# Patient Record
Sex: MALE | Race: WHITE | NOT HISPANIC OR LATINO | Employment: FULL TIME | URBAN - METROPOLITAN AREA
[De-identification: names, ages, dates, MRNs, and addresses within clinical notes are randomized per-mention and may not be internally consistent; named-entity substitution may affect disease eponyms.]

---

## 2017-01-12 ENCOUNTER — THERAPY VISIT (OUTPATIENT)
Dept: PHYSICAL THERAPY | Facility: CLINIC | Age: 62
End: 2017-01-12
Payer: COMMERCIAL

## 2017-01-12 DIAGNOSIS — M25.551 BILATERAL HIP PAIN: ICD-10-CM

## 2017-01-12 DIAGNOSIS — M25.651 HIP JOINT STIFFNESS, BILATERAL: Primary | ICD-10-CM

## 2017-01-12 DIAGNOSIS — M25.652 HIP JOINT STIFFNESS, BILATERAL: Primary | ICD-10-CM

## 2017-01-12 DIAGNOSIS — M25.552 BILATERAL HIP PAIN: ICD-10-CM

## 2017-01-12 PROCEDURE — 97530 THERAPEUTIC ACTIVITIES: CPT | Mod: GP | Performed by: PHYSICAL THERAPIST

## 2017-01-12 PROCEDURE — 97112 NEUROMUSCULAR REEDUCATION: CPT | Mod: GP | Performed by: PHYSICAL THERAPIST

## 2017-01-12 PROCEDURE — 97110 THERAPEUTIC EXERCISES: CPT | Mod: GP | Performed by: PHYSICAL THERAPIST

## 2017-01-12 NOTE — PROGRESS NOTES
NEXT VISIT:  Re-measure ROM, isolated strength, functional strength, balance      Activity  Reps/Set  X/Week  Goal    Joint Motion/Muscle Flexibility [TherEx]    All 4 s drills:  -pelvic tilting  -cat/cow  -rocking  10 reps per movement  Daily à every other day      Knee to chest, bottom leg stretched out straight  30-60 sec hold x 2 per side  Daily à every other day      Achilles stretch with step  10 gentle pushes, then 30 sec hold x 2 cycles  Daily à every other day      Targeted Muscle Strength [TherEx]    Straight leg raising lying on back  Work up to 20 reps x 2-3 sets per leg  3x/wk  Maintain neutral pelvis & spine as you lift/lower the leg  When easy, eliminate    All 4 s: fire-hydrant (out to the side), straight leg lift to horizontal (to the back) 10-15 reps  x 2 each 3x/wk  Full reps and sets with little to no fatigue    Bridging (single leg, arms crossed)  10-15 reps x 3 sets  3-4x/wk     Functional/Resisted Strength [TherAct]    Squats with 20# weight 10 reps x 3 sets  3x/wk  Gradually deeper squat OR increase weight   6-8  step drill (off side of step) 8-10 reps x 3 sets  3x/wk  Gradually higher step    1 leg squat hold + 45 degree band-resisted push to the back 15 toe taps x 3 per side 3x/wk Sit deeper into the squat   Band-resisted sidestepping 10 steps down, 10 steps back x 3-4 passes 3x/wk Sit deeper into squat, wider steps   Balance/Proprioception [Neuro Re-ed]    Marching balance drill; 3 patterns (in place, fwd/back, side/side) 10 marches x 1 set per pattern  3x/wk  Tolerate quicker foot switches or direction changes without losing balance    Eyes closed x 30 seconds   30 seconds x 4 per side  3x/day  30 second hold with NO hand touches consistently    Dynamic warm-up drill 2 laps per pattern 3x/wk Moving more quickly through the patterns

## 2017-02-02 ENCOUNTER — THERAPY VISIT (OUTPATIENT)
Dept: PHYSICAL THERAPY | Facility: CLINIC | Age: 62
End: 2017-02-02
Payer: COMMERCIAL

## 2017-02-02 DIAGNOSIS — M25.651 HIP JOINT STIFFNESS, BILATERAL: Primary | ICD-10-CM

## 2017-02-02 DIAGNOSIS — M25.551 BILATERAL HIP PAIN: ICD-10-CM

## 2017-02-02 DIAGNOSIS — M25.652 HIP JOINT STIFFNESS, BILATERAL: Primary | ICD-10-CM

## 2017-02-02 DIAGNOSIS — M25.552 BILATERAL HIP PAIN: ICD-10-CM

## 2017-02-02 PROCEDURE — 97750 PHYSICAL PERFORMANCE TEST: CPT | Mod: GP | Performed by: PHYSICAL THERAPIST

## 2017-02-02 PROCEDURE — 97110 THERAPEUTIC EXERCISES: CPT | Mod: GP | Performed by: PHYSICAL THERAPIST
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
## 2017-03-15 ENCOUNTER — THERAPY VISIT (OUTPATIENT)
Dept: PHYSICAL THERAPY | Facility: CLINIC | Age: 62
End: 2017-03-15
Payer: COMMERCIAL

## 2017-03-15 DIAGNOSIS — M25.552 BILATERAL HIP PAIN: ICD-10-CM

## 2017-03-15 DIAGNOSIS — M25.652 HIP JOINT STIFFNESS, BILATERAL: ICD-10-CM

## 2017-03-15 DIAGNOSIS — M25.551 BILATERAL HIP PAIN: ICD-10-CM

## 2017-03-15 DIAGNOSIS — M25.651 HIP JOINT STIFFNESS, BILATERAL: ICD-10-CM

## 2017-03-15 PROCEDURE — 97530 THERAPEUTIC ACTIVITIES: CPT | Mod: GP | Performed by: PHYSICAL THERAPIST

## 2017-03-15 PROCEDURE — 97110 THERAPEUTIC EXERCISES: CPT | Mod: GP | Performed by: PHYSICAL THERAPIST

## 2017-03-15 PROCEDURE — 97112 NEUROMUSCULAR REEDUCATION: CPT | Mod: GP | Performed by: PHYSICAL THERAPIST

## 2017-03-15 NOTE — MR AVS SNAPSHOT
After Visit Summary   3/15/2017    Evan Gregg    MRN: [id number]           Patient Information     Date Of Birth          1955        Visit Information        Provider Department      3/15/2017 7:00 AM Leni [facility]        Today's Diagnoses     Hip joint stiffness, bilateral        Bilateral hip pain           Follow-ups after your visit        Your next 10 appointments already scheduled     May 11, 2017  9:40 AM CDT   [facility] Extremity with [facility] ([facility] [place])    [address]  [address] [facility] [address] [phone]              Who to contact     If you have questions or need follow up information about today's clinic visit or your schedule please contact [facility] directly at [phone].  Normal or non-critical lab and imaging results will be communicated to you by MyChart, letter or phone within 4 business days after the clinic has received the results. If you do not hear from us within 7 days, please contact the clinic through Sunglasshart or phone. If you have a critical or abnormal lab result, we will notify you by phone as soon as possible.  Submit refill requests through [employer] or call your pharmacy and they will forward the refill request to us. Please allow 3 business days for your refill to be completed.          Additional Information About Your Visit        MyChart Information     [employer] gives you secure access to your electronic health record. If you see a primary care provider, you can also send messages to your care team and make appointments. If you have questions, please call your primary care clinic.  If you do not have a primary care provider, please call [phone] and they will assist you.        Care EveryWhere ID     This is your Care EveryWhere ID. This could be used by other organizations to access your [facility]  records  [id number]         Blood Pressure from Last 3 Encounters:   08/24/16 125/80   06/09/16 112/67   11/25/15 124/70    Weight from Last 3 Encounters:   08/24/16 91.4 kg (201 lb 6.4 oz)   06/09/16 89.8 kg (198 lb)   11/25/15 89.3 kg (196 lb 12.8 oz)              We Performed the Following     NEUROMUSCULAR RE-EDUCATION     THERAPEUTIC ACTIVITIES     THERAPEUTIC EXERCISES        Primary Care Provider Office Phone # Fax #    Brennan Pineda [phone]       [facility] [address] [facility] [address]        Thank you!     Thank you for choosing [facility]  for your care. Our goal is always to provide you with excellent care. Hearing back from our patients is one way we can continue to improve our services. Please take a few minutes to complete the written survey that you may receive in the mail after your visit with us. Thank you!             Your Updated Medication List - Protect others around you: Learn how to safely use, store and throw away your medicines at www.disposemymeds.org.          This list is accurate as of: 3/15/17 11:59 PM.  Always use your most recent med list.                   Brand Name Dispense Instructions for use    metroNIDAZOLE 0.75 % topical gel    METROGEL    45 g    Apply pea-sized amount or less to cheeks and nose as needed.       * triamcinolone 0.025 % cream    KENALOG    240 g    mix 50/50 with Eucerin for eczema.       * triamcinolone 0.1 % cream    KENALOG    480 g    Apply 1-2 times daily as needed for rash on hands and trunk       * Notice:  This list has 2 medication(s) that are the same as other medications prescribed for you. Read the directions carefully, and ask your doctor or other care provider to review them with you.

## 2017-03-15 NOTE — PROGRESS NOTES
DISCHARGE SUMMARY        SUBJECTIVE  Started doing yoga.  Right hip is less flexible than left.  Sitting for his commute has been no problem.  He walked a 5K in under 14 min/mi with no episodes of pain at the hip.  He notes that about 75% of the time he does NOT have any hip pain with walking.  Back to doing some of our pre-operative stretches - observes improvement (butterfly groin stretch in sitting; hurdler stretch; seated straddle sit; standing forward bend; standing quad stretch).    Initial Pain level: 4/10  Current Pain level: 0/10    Changes in function:  Yes (See Goal flowsheet attached for changes in current functional level)    Adverse reaction to treatment or activity: None    OBJECTIVE  Right Hip Flexion (SLR position)  Right 5/5, Left 4/5      ASSESSMENT/PLAN  Goals met.  Patient is competent with a progressive HEP.  He will continue with independent management and only f/u on an as needed basis with any new concerns or set-backs.    Discharge from formal PT.      Activity  Reps/Set  X/Week  Goal    Joint Motion/Muscle Flexibility [TherEx]   Basic LE stretching (HS, adductors, hip, quad, calf)  30 sec hold x 1-2 sets per exercise Daily à every other day  Progress further into the motion, respecting the hip joint   Basic hip mobility warm-up (all 4 s rocking, cat/cow)  Rocking x 10 reps each Prior to other exercises     Trunk/spine mobility (trunk rotation, yoga activities)       Targeted Muscle Strength [TherEx]   Straight leg raise lying on back  20 reps x 2-3 sets per leg  3x/wk  Maintain neutral pelvis & spine as you lift/lower the leg  When easy, eliminate    Gluteal Strength:   (pick 1 per workout)  -All 4 s fire-hydrant (out to -All 4 s straight leg lift to back  -Single leg bridging 10-15 reps x 3 sets 3x/wk  Full reps and sets with little to no fatigue    Sidelying hip abduction lift 30 reps x 1-2 sets 3x/wk Add ankle weight for resistance   Functional/Resisted Strength (pick 2 per workout)  [TherAct]   Squats with 20# weight 10 reps x 3 sets  3x/wk  Gradually deeper squat OR increase weight   6-8  step drill (off the side or stepping up from behind - lunge style) 6-10 reps x 3-4 sets  3x/wk  Gradually higher step    Lunges:  -straight forward; 45 degree diagonal 8-10 reps per leg; 1 set per direction 3x/wk    Balance/Proprioception (pick 1 per workout) [Neuro Re-ed]   Marching balance drill; 3 patterns (in place, fwd/back, side/side) 10 marches x 1 set per pattern  3x/wk  Tolerate quicker foot switches or direction changes without losing balance    Eyes closed x 30 seconds   30 seconds x 4 per side  3x/day  30 second hold with NO hand touches consistently    Dynamic warm-up drill 2 laps per pattern 3x/wk Moving more quickly through the patterns

## 2017-10-25 ENCOUNTER — OFFICE VISIT (OUTPATIENT)
Dept: FAMILY MEDICINE | Facility: CLINIC | Age: 62
End: 2017-10-25
Payer: COMMERCIAL

## 2017-10-25 VITALS
RESPIRATION RATE: 18 BRPM | SYSTOLIC BLOOD PRESSURE: 110 MMHG | DIASTOLIC BLOOD PRESSURE: 60 MMHG | BODY MASS INDEX: 28.98 KG/M2 | HEIGHT: 70 IN | TEMPERATURE: 98.7 F | WEIGHT: 202.4 LBS | HEART RATE: 68 BPM
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
## 2017-10-25 DIAGNOSIS — L30.9 DERMATITIS: ICD-10-CM

## 2017-10-25 PROCEDURE — 99213 OFFICE O/P EST LOW 20 MIN: CPT | Performed by: FAMILY MEDICINE

## 2017-10-25 RX ORDER — DOXYCYCLINE 100 MG/1
200 CAPSULE ORAL ONCE
Qty: 2 CAPSULE | Refills: 0 | Status: SHIPPED | OUTPATIENT
Start: 2017-10-25 | End: 2017-10-25

## 2017-10-25 RX ORDER — TRIAMCINOLONE ACETONIDE 0.25 MG/G
CREAM TOPICAL
Qty: 240 G | Refills: 5 | Status: SHIPPED | OUTPATIENT
Start: 2017-10-25 | End: 2023-05-15

## 2017-10-25 ASSESSMENT — PAIN SCALES - GENERAL: PAINLEVEL: MILD PAIN (2)

## 2017-10-25 NOTE — PATIENT INSTRUCTIONS
W57.XXXA) Tick bite, initial encounter  (primary encounter diagnosis)  Comment: deer tick  Plan: doxycycline (VIBRAMYCIN) 100 MG capsule        Since this is an endemic area for lymes I did discuss the possibility of using antibiotic prophyllaxis to prevent Lyme's.  Risk would be highest with immature tick attached for over 36 hours and treated within 72 hours.    Reviewed potential risks of antibiotic use which could be unnecessary vs. potentially missing Lyme's.   An alternative woud be to monitor for Lyme's symptoms or ECM rash and treat if symptoms appear.   Approach to prophylaxis -- recommended only in patients who meet all of the following criteria   Attached tick identified as an adult or nymphal Ixodes scapularis tick (deer tick).   Tick is estimated to have been attached for ?36 hours (by degree of engorgement or time of exposure).   Prophylaxis is begun within 72 hours of tick removal.   Local rate of infection of ticks with B. burgdorferi is ?20 percent (these rates of infection have been shown to occur in parts of [place], parts of the [place], and parts of [place] and [place]).   Doxycycline is not contraindicated (ie, the patient is not <8 years of age, pregnant, or lactating).  Prophylactic (preventative) antibiotic lyme treatment is not currently recommended in children under age 8 as the main medication used for this can cause problems with the teeth.  There are no other medications approved or tested for this use.     He elected to take antibiotics.   Will treat with doxycycline 100 mg 2 pills at one time.  Recheck with further problems.    For further information check out the handout on lyme disease from [facility], the [facility] website ([email or web address]/) or [employer] patient education information.       (L30.9) Dermatitis  Comment: needs refill  Plan: triamcinolone (KENALOG) 0.025 % cream

## 2017-10-25 NOTE — MR AVS SNAPSHOT
After Visit Summary   10/25/2017    Evan Gregg    MRN: [id number]           Patient Information     Date Of Birth          1955        Visit Information        Provider Department      10/25/2017 10:00 AM Brennan Pineda MD [facility]        Today's Diagnoses     Tick bite, initial encounter    -  1    Dermatitis          Care Instructions    W57.XXXA) Tick bite, initial encounter  (primary encounter diagnosis)  Comment: deer tick  Plan: doxycycline (VIBRAMYCIN) 100 MG capsule        Since this is an endemic area for lymes I did discuss the possibility of using antibiotic prophyllaxis to prevent Lyme's.  Risk would be highest with immature tick attached for over 36 hours and treated within 72 hours.    Reviewed potential risks of antibiotic use which could be unnecessary vs. potentially missing Lyme's.   An alternative woud be to monitor for Lyme's symptoms or ECM rash and treat if symptoms appear.   Approach to prophylaxis -- recommended only in patients who meet all of the following criteria   Attached tick identified as an adult or nymphal Ixodes scapularis tick (deer tick).   Tick is estimated to have been attached for ?36 hours (by degree of engorgement or time of exposure).   Prophylaxis is begun within 72 hours of tick removal.   Local rate of infection of ticks with B. burgdorferi is ?20 percent (these rates of infection have been shown to occur in parts of [place], parts of the [place], and parts of [place] and [place]).   Doxycycline is not contraindicated (ie, the patient is not <8 years of age, pregnant, or lactating).  Prophylactic (preventative) antibiotic lyme treatment is not currently recommended in children under age 8 as the main medication used for this can cause problems with the teeth.  There are no other medications approved or tested for this use.     He elected to take antibiotics.   Will treat with doxycycline 100 mg 2  "pills at one time.  Recheck with further problems.    For further information check out the handout on lyme disease from [facility], the [facility] website ([email or web address]/) or [employer] patient education information.       (L30.9) Dermatitis  Comment: needs refill  Plan: triamcinolone (KENALOG) 0.025 % cream          Follow-ups after your visit        Who to contact     If you have questions or need follow up information about today's clinic visit or your schedule please contact [facility] directly at [phone].  Normal or non-critical lab and imaging results will be communicated to you by enrich-inhart, letter or phone within 4 business days after the clinic has received the results. If you do not hear from us within 7 days, please contact the clinic through enrich-inhart or phone. If you have a critical or abnormal lab result, we will notify you by phone as soon as possible.  Submit refill requests through [employer] or call your pharmacy and they will forward the refill request to us. Please allow 3 business days for your refill to be completed.          Additional Information About Your Visit        MyChart Information     [employer] gives you secure access to your electronic health record. If you see a primary care provider, you can also send messages to your care team and make appointments. If you have questions, please call your primary care clinic.  If you do not have a primary care provider, please call [phone] and they will assist you.        Care EveryWhere ID     This is your Care EveryWhere ID. This could be used by other organizations to access your [place] medical records  [id number]        Your Vitals Were     Pulse Temperature Respirations Height BMI (Body Mass Index)       68 98.7  F (37.1  C) (Tympanic) 18 5' 10\" (1.778 m) 29.04 kg/m2        Blood Pressure from Last 3 Encounters:   10/25/17 110/60   08/24/16 125/80   06/09/16 112/67    Weight from Last 3 " Encounters:   10/25/17 202 lb 6.4 oz (91.8 kg)   08/24/16 201 lb 6.4 oz (91.4 kg)   06/09/16 198 lb (89.8 kg)              Today, you had the following     No orders found for display         Today's Medication Changes          These changes are accurate as of: 10/25/17 11:27 AM.  If you have any questions, ask your nurse or doctor.               Start taking these medicines.        Dose/Directions    doxycycline 100 MG capsule   Commonly known as:  VIBRAMYCIN   Used for:  Tick bite, initial encounter   Started by:  Brennan Pineda MD        Dose:  200 mg   Take 2 capsules (200 mg) by mouth once for 1 dose   Quantity:  2 capsule   Refills:  0         These medicines have changed or have updated prescriptions.        Dose/Directions    * triamcinolone 0.1 % cream   Commonly known as:  KENALOG   This may have changed:  Another medication with the same name was changed. Make sure you understand how and when to take each.   Used for:  Dermatitis   Changed by:  Brennan Pineda MD        Apply 1-2 times daily as needed for rash on hands and trunk   Quantity:  480 g   Refills:  3       * triamcinolone 0.025 % cream   Commonly known as:  KENALOG   This may have changed:  additional instructions   Used for:  Dermatitis   Changed by:  Brennan Pineda MD        mix 50/50 with Eucerin for eczema. (Patient will mix himself)   Quantity:  240 g   Refills:  5       * Notice:  This list has 2 medication(s) that are the same as other medications prescribed for you. Read the directions carefully, and ask your doctor or other care provider to review them with you.         Where to get your medicines      These medications were sent to [id number] - [facility] [address]  [address] [address]    Hours:  Closed 10-16-08 business to [facility] Phone:  [phone]     doxycycline 100 MG capsule    triamcinolone 0.025 % cream                Primary Care Provider Office Phone # Fax #     Brennan Pineda [phone]       [address] [place] [address]        Equal Access to Services     MACK MCKOY : Hadii aad ku hadroddy Chance, suzanne elliezacharyha, carlos shabubba bhatia, jin figueroayohan mahesh. So [facility] [phone].    ATENCIÓN: Si habla español, tiene a guevara disposición servicios gratuitos de asistencia lingüística. Llame al [phone].    We comply with applicable federal civil rights laws and [place] laws. We do not discriminate on the basis of race, color, national origin, age, disability, sex, sexual orientation, or gender identity.            Thank you!     Thank you for choosing [facility]  for your care. Our goal is always to provide you with excellent care. Hearing back from our patients is one way we can continue to improve our services. Please take a few minutes to complete the written survey that you may receive in the mail after your visit with us. Thank you!             Your Updated Medication List - Protect others around you: Learn how to safely use, store and throw away your medicines at www.disposemymeds.org.          This list is accurate as of: 10/25/17 11:27 AM.  Always use your most recent med list.                   Brand Name Dispense Instructions for use Diagnosis    doxycycline 100 MG capsule    VIBRAMYCIN    2 capsule    Take 2 capsules (200 mg) by mouth once for 1 dose    Tick bite, initial encounter       metroNIDAZOLE 0.75 % topical gel    METROGEL    45 g    Apply pea-sized amount or less to cheeks and nose as needed.    Acne rosacea       * triamcinolone 0.1 % cream    KENALOG    480 g    Apply 1-2 times daily as needed for rash on hands and trunk    Dermatitis       * triamcinolone 0.025 % cream    KENALOG    240 g    mix 50/50 with Eucerin for eczema. (Patient will mix himself)    Dermatitis       * Notice:  This list has 2 medication(s) that are the same as other medications prescribed for you.  Read the directions carefully, and ask your doctor or other care provider to review them with you.

## 2017-10-25 NOTE — PROGRESS NOTES
"  SUBJECTIVE:   Evan Gregg is a 62 year old male who presents to clinic today for the following health issues:  Tick bite - right hip    Chief Complaint   Patient presents with     Tick Bite     R hip- states not sure if he was able to remove it all.       Duration: Found deer tick embedded on right hip 10/22/2017, had no signs of tick on 10/20/17    Description (location/character/radiation): R hip    Intensity:  mild    Accompanying signs and symptoms: some increase redness and wondering if tick was all removed.    History (similar episodes/previous evaluation): PAST HX OF BILATERAL HIP REPLACEMENT    Precipitating or alleviating factors: None    Therapies tried and outcome: None   Found tick embedded on 10/22, removed tick with alcohol and gently pulled tick off. Is not sure entire tick was removed. Was walking through woods and fields with dogs on weekend on 10/20-10/22. Found tick when taking a shower. He is sure it was a deer tick as he has seen them on his dogs in the past. Is concerned of Lyme disease affecting his bilateral hip replacements. No fever, weakness, fatigue, nausea, vomiting, new joint pain, or \"bullseye rash\".    Problem list and histories reviewed & adjusted, as indicated.  Additional history: as documented    Labs reviewed in EPIC    Reviewed and updated as needed this visit by clinical staffTobacco  Allergies  Med Hx  Surg Hx  Fam Hx  Soc Hx      Reviewed and updated as needed this visit by Provider       ROS:  Constitutional: No fever, chills, malaise, weakness  ENT: Feels congested. No sore throat, drainage  Respiratory: No shortness of breath, wheezing, cough  Cardiovascular: No chest pain or palpitations  Gastrointestinal: No nausea, vomiting  Skin: Small red rash on right hip at site of tick bite.   Musculoskeletal: No arthralgias, myalgias, muscle weakness  Allergic: No known allergies  Neurologic: Headache. No focal weakness, sensory loss, paresthesias  Psychiatric: " "Increased stress due to wife's recent hospitalization. No changes in sleep, mood, interest.    OBJECTIVE:     /60 (BP Location: Right arm, Patient Position: Chair, Cuff Size: Adult Large)  Pulse 68  Temp 98.7  F (37.1  C) (Tympanic)  Resp 18  Ht 5' 10\" (1.778 m)  Wt 202 lb 6.4 oz (91.8 kg)  BMI 29.04 kg/m2  Body mass index is 29.04 kg/(m^2).  Constitutional: well appearing, in no acute distress  Head: normocephalic  Eyes: PERRLA bilaterally, EOMs intact bilaterally, anicteric sclera  ENT: moist mucous membranes, non-erythematous oropharynx, no lymphadenopathy  Respiratory: lung sounds clear to auscultation and equal bilaterally, no wheezes, crackles, or rhonchi  Cardiovascular: regular rate and rhythm, normal S1 and S2, no rubs, murmurs or gallops, peripheral pulses strong and equal bilaterally, no peripheral edema  Skin: pinpoint scabbing at site of tick bite with 2x2 cm erythematous base on right lateral hip, no visualized tick remnants   Neuro: alert and oriented to person, time, and place  Psych: appropriate mood and affect, cooperative with exam    Diagnostic Test Results:  none     ASSESSMENT/PLAN:   1. Tick bite, initial encounter: Deer tick bite per patient report. No signs or symptoms of fatigue, fever, joint pains, or characteristic bullseye rash indicative of Lyme disease. No remnants of tick to remove. Will prophylactically treat with doxycycline as he meets criteria of known deer tick bite within 72 hours, in region of Lyme disease, and period of tick attachment. Patient instructed to monitor self for signs and symptoms of Lyme disease, informational handouts provided.  - doxycycline (VIBRAMYCIN) 100 MG capsule; Take 2 capsules (200 mg) by mouth once for 1 dose  Dispense: 2 capsule; Refill: 0    2. Dermatitis: History of eczema, is well controlled with current therapy. Refill needed.  - triamcinolone (KENALOG) 0.025 % cream; mix 50/50 with Eucerin for eczema. (Patient will mix himself)  " Dispense: 240 g; Refill: 5    Esteban James, MS3    I did see and examine patient with Esteban James today.   MARLON BOWER MD     ASSESSMENT:   (W57.XXXA) Tick bite, initial encounter  (primary encounter diagnosis)  Comment: deer tick  Plan: doxycycline (VIBRAMYCIN) 100 MG capsule        Since this is an endemic area for lymes I did discuss the possibility of using antibiotic prophyllaxis to prevent Lyme's.  Risk would be highest with immature tick attached for over 36 hours and treated within 72 hours.    Reviewed potential risks of antibiotic use which could be unnecessary vs. potentially missing Lyme's.   An alternative woud be to monitor for Lyme's symptoms or ECM rash and treat if symptoms appear.   Approach to prophylaxis -- recommended only in patients who meet all of the following criteria   Attached tick identified as an adult or nymphal Ixodes scapularis tick (deer tick).   Tick is estimated to have been attached for ?36 hours (by degree of engorgement or time of exposure).   Prophylaxis is begun within 72 hours of tick removal.   Local rate of infection of ticks with B. burgdorferi is ?20 percent (these rates of infection have been shown to occur in parts of [place], parts of the [place], and parts of [place] and [place]).   Doxycycline is not contraindicated (ie, the patient is not <8 years of age, pregnant, or lactating).  Prophylactic (preventative) antibiotic lyme treatment is not currently recommended in children under age 8 as the main medication used for this can cause problems with the teeth.  There are no other medications approved or tested for this use.     He elected to take antibiotics.   Will treat with doxycycline 100 mg 2 pills at one time.  Recheck with further problems.    For further information check out the handout on lyme disease from CDC, the [facility] website (http://[email or web address].[email or web address]/) or [facility] patient education information.        (L30.9) Dermatitis  Comment: needs refill  Plan: triamcinolone (KENALOG) 0.025 % cream

## 2017-10-25 NOTE — NURSING NOTE
"Chief Complaint   Patient presents with     Tick Bite     R hip- states not sure if he was able to remove it all.       Initial /60 (BP Location: Right arm, Patient Position: Chair, Cuff Size: Adult Large)  Pulse 68  Temp 98.7  F (37.1  C) (Tympanic)  Resp 18  Ht 5' 10\" (1.778 m)  Wt 202 lb 6.4 oz (91.8 kg)  BMI 29.04 kg/m2 Estimated body mass index is 29.04 kg/(m^2) as calculated from the following:    Height as of this encounter: 5' 10\" (1.778 m).    Weight as of this encounter: 202 lb 6.4 oz (91.8 kg).  Medication Reconciliation: complete    Health Maintenance that is potentially due pending provider review:          Is there anyone who you would like to be able to receive your results? No  If yes have patient fill out FADUMO  Lucy Segundo CMA    "

## 2018-01-31 ENCOUNTER — RADIANT APPOINTMENT (OUTPATIENT)
Dept: GENERAL RADIOLOGY | Facility: CLINIC | Age: 63
End: 2018-01-31
Attending: FAMILY MEDICINE
Payer: COMMERCIAL

## 2018-01-31 ENCOUNTER — OFFICE VISIT (OUTPATIENT)
Dept: ORTHOPEDICS | Facility: CLINIC | Age: 63
End: 2018-01-31
Payer: COMMERCIAL

## 2018-01-31 VITALS
WEIGHT: 210 LBS | DIASTOLIC BLOOD PRESSURE: 85 MMHG | HEART RATE: 58 BPM | BODY MASS INDEX: 30.06 KG/M2 | HEIGHT: 70 IN | SYSTOLIC BLOOD PRESSURE: 138 MMHG

## 2018-01-31 DIAGNOSIS — M24.021 LOOSE BODY IN ELBOW JOINT, RIGHT: ICD-10-CM

## 2018-01-31 DIAGNOSIS — M25.511 ACUTE PAIN OF RIGHT SHOULDER: Primary | ICD-10-CM

## 2018-01-31 DIAGNOSIS — M25.521 CHRONIC PAIN OF RIGHT ELBOW: ICD-10-CM

## 2018-01-31 DIAGNOSIS — G89.29 CHRONIC PAIN OF RIGHT ELBOW: ICD-10-CM

## 2018-01-31 DIAGNOSIS — M25.511 ACUTE PAIN OF RIGHT SHOULDER: ICD-10-CM

## 2018-01-31 DIAGNOSIS — M19.011 OSTEOARTHRITIS OF RIGHT AC (ACROMIOCLAVICULAR) JOINT: ICD-10-CM

## 2018-01-31 DIAGNOSIS — M19.021 PRIMARY OSTEOARTHRITIS OF RIGHT ELBOW: ICD-10-CM

## 2018-01-31 RX ORDER — MELOXICAM 15 MG/1
15 TABLET ORAL DAILY
Qty: 15 TABLET | Refills: 0 | Status: SHIPPED | OUTPATIENT
Start: 2018-01-31 | End: 2019-07-25

## 2018-01-31 NOTE — LETTER
1/31/2018       RE: Evan Gregg  [address] [place] [facility] [address]     Dear Colleague,    Thank you for referring your patient, Evan Gregg, to the [facility] SPORTS AND ORTHOPAEDIC WALK IN CLINIC at [facility]. Please see a copy of my visit note below.          SPORTS & ORTHOPEDIC WALK-IN VISIT 1/31/2018    Primary Care Physician: Dr. Pineda    Reason for visit:     What part of your body is injured / painful?  right arm pain, anterior shoulder pain    What caused the injury /pain? Unsure, gradual onset    How long ago did your injury occur or pain begin? several months ago, 4 months    What are your most bothersome symptoms? Pain and Weakness    How would you characterize your symptom?  aching and sharp    What makes your symptoms better? Ibuprofen, Rest    What makes your symptoms worse? Movement (overhead lifting, adduction), push ups    Have you been previously seen for this problem? No    Medical History:    Any recent changes to your medical history? No    Any new medication prescribed since last visit? No    Have you had surgery on this body part before? No    Social History:    Occupation: [employer]    Handedness: Right    Exercise: Strength training, push ups    Review of Systems:    Do you have fever, chills, weight loss? No    Do you have any vision problems? No    Do you have any chest pain or edema? No    Do you have any shortness of breath or wheezing?  No    Do you have stomach problems? No    Do you have any numbness or focal weakness? No, occasionally down right arm    Do you have diabetes? No    Do you have problems with bleeding or clotting? No    Do you have an rashes or other skin lesions? No           CHIEF COMPLAINT:  Consult (Right arm )       HISTORY OF PRESENT ILLNESS  Mr. Gregg is a pleasant 62 year old year old male who presents to clinic today with right anterior shoulder and elbow pain.  Evan explains that his primary concern  today is right anterior shoulder.  He states that he has been experiencing pain with overhead motion as well as push-up and cross body motions.  This began several months ago at top of his shoulder as well as anteriorly at humerus.  Denies lateral shoulder pain about deltoid.  He also feels like he has been becoming weaker during his upper body workouts.   No radicular pain.     Treatments to date have included ibuprofen, relative rest which has been unsuccessful.  He has not had any surgeries to his shoulder or arm.    He does note clicking of his right elbow and stiffness.  No overt pain of the elbow however but he believes stiffness has made it more difficult for him to complete upper body workouts.    Additional history: History of arthritis of his hips, status post hip resurfacing.    MEDICAL HISTORY  Patient Active Problem List   Diagnosis     Rosacea     CARDIOVASCULAR SCREENING; LDL GOAL LESS THAN 160     Plantar wart of left foot     BCC (basal cell carcinoma), arm     Osteoarthritis of hip     Bilateral hip pain     Abnormal gait     Primary osteoarthritis of both hips     Hip joint stiffness, bilateral       Current Outpatient Prescriptions   Medication Sig Dispense Refill     meloxicam (MOBIC) 15 MG tablet Take 1 tablet (15 mg) by mouth daily 15 tablet 0     triamcinolone (KENALOG) 0.025 % cream mix 50/50 with Eucerin for eczema. (Patient will mix himself) 240 g 5     triamcinolone (KENALOG) 0.1 % cream Apply 1-2 times daily as needed for rash on hands and trunk 480 g 3     metroNIDAZOLE (METROGEL) 0.75 % gel Apply pea-sized amount or less to cheeks and nose as needed. 45 g 1       Allergies   Allergen Reactions     No Known Drug Allergies        Family History   Problem Relation Age of Onset     Thyroid Disease Mother      HEART DISEASE Father      heart attack     CANCER Father      skin cancer     Lipids Father      Hypertension Father      Cancer - colorectal Maternal Grandfather      Thyroid Disease  "Brother      CANCER Brother      skin ca     Social history: Patient is right-handed, works at the [employer], exercises regularly    Additional medical/Social/Surgical histories reviewed in [facility] and updated as appropriate.     REVIEW OF SYSTEMS (2/1/2018)  CONSTITUTIONAL: Denies fever and weight loss  EYES: Denies acute vision changes  ENT: Denies hearing changes or difficulty swallowing  CARDIAC: Denies chest pain or edema  RESPIRATORY: Denies dyspnea, cough or wheeze  GASTROINTESTINAL: Denies abdominal pain, nausea, vomiting  MUSCULOSKELETAL: See HPI  SKIN: Denies any recent rash or lesion  NEUROLOGICAL: Denies numbness or focal weakness  PSYCHIATRIC: No history of psychiatric symptoms or problems  ENDOCRINE: Denies current diagnosis of diabetes  HEMATOLOGY: Denies episodes of easy bleeding      PHYSICAL EXAM  /85  Pulse 58  Ht 1.778 m (5' 10\")  Wt 95.3 kg (210 lb)  BMI 30.13 kg/m2    General  - normal appearance, in no obvious distress  CV  - normal radial pulse  Pulm  - normal respiratory pattern, non-labored  Musculoskeletal - Right shoulder  - inspection: normal bone and joint alignment, no obvious deformity, no scapular winging, no AC step-off, mild swelling over AC joint, normal clavicle  - palpation: tender over AC joint, clavicle does not displace when pressed.  Additional tenderness to palpation at bicipital groove.    - ROM: Discomfort with passive flexion past 90 deg, painful cross body adduction  - strength: 5/5  strength, 5/5 in all shoulder planes  - special tests:  (+) crossarm  (-) Speed's  (-) Yergason's  (-) Neer  (-) Hawkin's  (-) Isidro  (-) Glacier's    Right elbow with full flexion, limited extension to about 20 degrees. Decreased pronation and supination.  Mild tenderness at common extensor tendon.  Full painless elb isow flexion, extension 5/5, wrist extension 5/5, and long finger extension 5/5.      Neuro  - no sensory or motor deficit, grossly normal " coordination, normal muscle tone  Skin  - no ecchymosis, erythema, warmth, or induration, no obvious rash  Psych  - interactive, appropriate, normal mood and affect    IMAGING : XR right shoulder, elbow. Final results and radiologist's interpretation, available in the [facility] health record. Images were reviewed with the patient/family members in the office today. My personal interpretation of the performed imaging is shoulder without glenohumeral osteoarthritis.  Moderate AC joint narrowing with osteophyte superiorly.       Right elbow with enthesopathic changes at olecranon, medial epicondyle.  Degenerative changes of elbow present.       ASSESSMENT & PLAN  Mr. Gregg is a 62 year old year old male who presents to clinic today with pain of his anterior  Shoulder and stiffness of right elbow.  Shoulder pain likely secondary to #1 AC joint osteoarthritis.  #2 mild biceps tendonitis.  We discussed initiating PT for shoulder, discussing resistance exercise limitations with therapist, and returning in 6 weeks to consider corticosteroid injection.    In regard to right elbow, degenerative changes are seen.  Initiate elbow physical therapy and avoid exacerbating motions.  Start voltaren x 1 week.  Consider MRI for evaluation of loose bodies /degenerative changes if persisting.    Diagnosis:   Acromioclavicular joint osteoarthritis of right shoulder  Mild biceps tendinitis of right shoulder  Right elbow osteoarthritis    F/u after physical therapy in 6 week    It was a pleasure seeing Evan today.      Again, thank you for allowing me to participate in the care of your patient.      Sincerely,    Levar Tracy, DO

## 2018-01-31 NOTE — PROGRESS NOTES
SPORTS & ORTHOPEDIC WALK-IN VISIT 1/31/2018    Primary Care Physician: Dr. Pineda    Reason for visit:     What part of your body is injured / painful?  right arm pain, anterior shoulder pain    What caused the injury /pain? Unsure, gradual onset    How long ago did your injury occur or pain begin? several months ago, 4 months    What are your most bothersome symptoms? Pain and Weakness    How would you characterize your symptom?  aching and sharp    What makes your symptoms better? Ibuprofen, Rest    What makes your symptoms worse? Movement (overhead lifting, adduction), push ups    Have you been previously seen for this problem? No    Medical History:    Any recent changes to your medical history? No    Any new medication prescribed since last visit? No    Have you had surgery on this body part before? No    Social History:    Occupation: [employer]    Handedness: Right    Exercise: Strength training, push ups    Review of Systems:    Do you have fever, chills, weight loss? No    Do you have any vision problems? No    Do you have any chest pain or edema? No    Do you have any shortness of breath or wheezing?  No    Do you have stomach problems? No    Do you have any numbness or focal weakness? No, occasionally down right arm    Do you have diabetes? No    Do you have problems with bleeding or clotting? No    Do you have an rashes or other skin lesions? No

## 2018-01-31 NOTE — MR AVS SNAPSHOT
After Visit Summary   1/31/2018    Evan Gregg    MRN: [id number]           Patient Information     Date Of Birth          1955        Visit Information        Provider Department      1/31/2018 8:10 AM Levar Tracy DO M [facility] and Orthopaedic Walk In [facility]        Today's Diagnoses     Acute pain of right shoulder    -  1    Chronic pain of right elbow        Primary osteoarthritis of right elbow        Osteoarthritis of right AC (acromioclavicular) joint        Loose body in elbow joint, right           Follow-ups after your visit        Additional Services     PHYSICAL THERAPY REFERRAL (Internal)       Physical Therapy Referral                  Follow-up notes from your care team     Return in about 6 weeks (around 3/14/2018).      Your next 10 appointments already scheduled     Feb 07, 2018  8:20 AM CST   VIKASH Extremity with DONAVAN Del Angel [facility] Physical Therapy VIKASH ([facility])    [address] [place] [id number] [phone]            Feb 15, 2018  8:40 AM CST   VIKASH Extremity with WILBUR Sanders [facility] Physical Therapy VIKASH ([facility])    [address] [place] [id number] [phone]            Mar 01, 2018  8:40 AM CST   VIKASH Extremity with WILBUR Sanders [facility] Physical Therapy VIKASH ([facility])    [address] [place] [address] [phone]            Mar 08, 2018  8:40 AM CST   VIKASH Extremity with WILBUR Sanders [facility] Physical Therapy VIKASH ([facility])    [address] [place] [address] [phone]              Who to contact     Please call your clinic at [phone] to:    Ask questions about your health    Make or cancel appointments    Discuss your medicines    Learn about your test  "results    Speak to your doctor   If you have compliments or concerns about an experience at your clinic, or if you wish to file a complaint, please contact [facility] Patient Relations at [phone] or email us at [email or web address]         Additional Information About Your Visit        [employer] Information     [employer] gives you secure access to your electronic health record. If you see a primary care provider, you can also send messages to your care team and make appointments. If you have questions, please call your primary care clinic.  If you do not have a primary care provider, please call [phone] and they will assist you.      [employer] is an electronic gateway that provides easy, online access to your medical records. With [employer], you can request a clinic appointment, read your test results, renew a prescription or communicate with your care team.     To access your existing account, please contact your [facility] or call [phone] for assistance.        Care EveryWhere ID     This is your Care EveryWhere ID. This could be used by other organizations to access your [facility] medical records  [id number]        Your Vitals Were     Pulse Height BMI (Body Mass Index)             58 1.778 m (5' 10\") 30.13 kg/m2          Blood Pressure from Last 3 Encounters:   01/31/18 138/85   10/25/17 110/60   08/24/16 125/80    Weight from Last 3 Encounters:   01/31/18 95.3 kg (210 lb)   10/25/17 91.8 kg (202 lb 6.4 oz)   08/24/16 91.4 kg (201 lb 6.4 oz)              We Performed the Following     PHYSICAL THERAPY REFERRAL (Internal)          Today's Medication Changes          These changes are accurate as of 1/31/18 11:59 PM.  If you have any questions, ask your nurse or doctor.               Start taking these medicines.        Dose/Directions    meloxicam 15 MG tablet   Commonly known as:  MOBIC   Used for:  Acute pain of right shoulder, " Chronic pain of right elbow   Started by:  Levar Tracy DO        Dose:  15 mg   Take 1 tablet (15 mg) by mouth daily   Quantity:  15 tablet   Refills:  0            Where to get your medicines      These medications were sent to [id number] - [facility] [address]  [address] [address]    Hours:  Closed 10-16-08 business to [facility] Phone:  [phone]     meloxicam 15 MG tablet                Primary Care Provider Office Phone # Fax #    Brennan Pineda [phone] [address] [facility] [address]        Equal Access to Services     [facility]: Hadii bernard Fletcher, waaxda lutroy, qaybta kaalmada jannette, jin osman . So [facility] [phone].    ATENCIÓN: Si habla español, tiene a guevara disposición servicios gratuitos de asistencia lingüística. [facility] [phone].    We comply with applicable federal civil rights laws and [place] laws. We do not discriminate on the basis of race, color, national origin, age, disability, sex, sexual orientation, or gender identity.            Thank you!     Thank you for choosing [facility] SPORTS AND ORTHOPAEDIC WALK IN CLINIC  for your care. Our goal is always to provide you with excellent care. Hearing back from our patients is one way we can continue to improve our services. Please take a few minutes to complete the written survey that you may receive in the mail after your visit with us. Thank you!             Your Updated Medication List - Protect others around you: Learn how to safely use, store and throw away your medicines at www.disposemymeds.org.          This list is accurate as of 1/31/18 11:59 PM.  Always use your most recent med list.                   Brand Name Dispense Instructions for use Diagnosis    meloxicam 15 MG tablet    MOBIC    15 tablet    Take 1 tablet (15 mg) by mouth daily    Acute pain of right shoulder, Chronic pain of right  elbow       metroNIDAZOLE 0.75 % topical gel    METROGEL    45 g    Apply pea-sized amount or less to cheeks and nose as needed.    Acne rosacea       * triamcinolone 0.1 % cream    KENALOG    480 g    Apply 1-2 times daily as needed for rash on hands and trunk    Dermatitis       * triamcinolone 0.025 % cream    KENALOG    240 g    mix 50/50 with Eucerin for eczema. (Patient will mix himself)    Dermatitis       * Notice:  This list has 2 medication(s) that are the same as other medications prescribed for you. Read the directions carefully, and ask your doctor or other care provider to review them with you.

## 2018-02-01 ENCOUNTER — THERAPY VISIT (OUTPATIENT)
Dept: PHYSICAL THERAPY | Facility: CLINIC | Age: 63
End: 2018-02-01
Payer: COMMERCIAL

## 2018-02-01 DIAGNOSIS — M25.521 RIGHT ELBOW PAIN: ICD-10-CM

## 2018-02-01 DIAGNOSIS — M25.511 SHOULDER PAIN, RIGHT: Primary | ICD-10-CM

## 2018-02-01 PROCEDURE — 97161 PT EVAL LOW COMPLEX 20 MIN: CPT | Mod: GP | Performed by: PHYSICAL THERAPIST

## 2018-02-01 PROCEDURE — 97530 THERAPEUTIC ACTIVITIES: CPT | Mod: GP | Performed by: PHYSICAL THERAPIST

## 2018-02-01 PROCEDURE — 97110 THERAPEUTIC EXERCISES: CPT | Mod: GP | Performed by: PHYSICAL THERAPIST

## 2018-02-01 NOTE — PROGRESS NOTES
[facility] Initial Evaluation  Subjective:  Patient is a 62 year old male presenting with rehab right shoulder hpi.   Affected Side: Right shoulder and elbow pain, elbow osteoarthritis, LHB tendonopathy, ACJ  OA.  Condition occurred with:  Degenerative joint disease.  Condition occurred: for unknown reasons.  This is a new condition  1/31/18, date of MD order  .    Site of Pain: anterior and superior shoulder, deep in the shoulder joint and elbow joint.    Pain is described as aching and sharp and is intermittent and reported as 9/10 and 3/10.  Associated symptoms:  Loss of motion/stiffness and loss of strength. Pain is the same all the time.  Exacerbated by: laying on right shoulder, bending elbow, raising arm, dressing, bathing, resistance training, pushups. and relieved by rest.  Since onset symptoms are unchanged.  Special tests:  X-ray (shoulder:  AC joint OA, Elbow: osteoarthritis).  Previous treatment: none.    General health as reported by patient is excellent.  Pertinent medical history includes:  Osteoarthritis.  Medical allergies: no.  Other surgeries include:  Orthopedic surgery (Bilateral hip resurfacing).  Current medications:  None as reported by the patient.  Current occupation is Electronics and Computer Support.  Patient is working in normal job without restrictions.  Primary job tasks include:  Prolonged sitting and repetitive tasks.    Barriers include:  None as reported by the patient.    Red flags:  None as reported by the patient.                        Objective:  Standing Alignment:      Shoulder/UE:  Rounded shoulders, scapular winging L and scapular winging R                  Flexibility/Screens:   Positive screens:  ShoulderNegative screens: Cervical                              Shoulder Evaluation:  ROM:  AROM:  normal                            PROM:  normal                                Strength:    Flexion: Left:5-/5   Pain:    Right: 4+/5     Pain:      Abduction:  Left: 5-/5  Pain:    Right: 4+/5     Pain:    Internal Rotation:  Left:5/5     Pain:    Right: 5/5     Pain:  External Rotation:   Left:5-/5     Pain:   Right:4/5     Pain:    Horizontal Abduction:  Left:4/5     Pain:    Right:3+/5    Pain:    Elbow Flexion:  Left:5/5     Pain:    Right:5/5     Pain:  Elbow Extension:  Left:5/5     Pain:    Right:5/5     Pain:    Special Tests:      Left shoulder negative for the following special tests:  Impingement; Rotator cuff tear and Acromioclavicular  Right shoulder positive for the following special tests:Acrimioclavicular  Right shoulder negative for the following special tests:Impingement and Rotator cuff tear  Palpation:      Right shoulder tenderness present at: Acrimioclavicular and Bicipital Groove  Right shoulder tenderness not present at:Supraspinatus; Infraspinatus; Teres Minor or Subscapularis       ROM:  AROM:    Hyperextension Elbow: Left:  NA  Right:  NA  Flexion Elbow:  Left:121   Right:144  Extension Elbow:  Left: 28    Right: 4      Supination Elbow/Wrist:  Left: wnlRight: wnl  Pronation Elbow/Wrist:  Left: wnl    Right: wnl                                                  General     ROS    Assessment/Plan:    Patient is a 62 year old male with right side shoulder and right side elbow complaints.    Patient has the following significant findings with corresponding treatment plan.                Diagnosis 1:  Right Shoulder ACJ OA, LHB Tendonitis, RIght Elbow OA    Pain -  hot/cold therapy, splint/taping/bracing/orthotics, self management, education and home program  Decreased ROM/flexibility - manual therapy and therapeutic exercise  Decreased joint mobility - manual therapy and therapeutic exercise  Decreased strength - therapeutic exercise and therapeutic activities  Impaired muscle performance - neuro re-education  Decreased function - therapeutic activities  Impaired posture - neuro re-education      Therapy Evaluation Codes:   1) History  comprised of:   Personal factors that impact the plan of care:      Profession.    Comorbidity factors that impact the plan of care are:      Osteoarthritis.     Medications impacting care: None.  2) Examination of Body Systems comprised of:   Body structures and functions that impact the plan of care:      Elbow and Shoulder.   Activity limitations that impact the plan of care are:      Bathing, Bending, Cooking, Driving, Dressing, Lifting, Reading/Computer work, Throwing, Working and Sleeping.  3) Clinical presentation characteristics are:   Stable/Uncomplicated.  4) Decision-Making    Low complexity using standardized patient assessment instrument and/or measureable assessment of functional outcome.  Cumulative Therapy Evaluation is: Low complexity.    Previous and current functional limitations:  (See Goal Flow Sheet for this information)    Short term and Long term goals: (See Goal Flow Sheet for this information)     Communication ability:  Patient appears to be able to clearly communicate and understand verbal and written communication and follow directions correctly.  Treatment Explanation - The following has been discussed with the patient:   RX ordered/plan of care  Anticipated outcomes  Possible risks and side effects  This patient would benefit from PT intervention to resume normal activities.   Rehab potential is good.    Frequency:  1 X week, once daily  Duration:  for 6 weeks tapering to 2 x month for one month  Discharge Plan:  Achieve all LTG.  Independent in home treatment program.  Reach maximal therapeutic benefit.    Please refer to the daily flowsheet for treatment today, total treatment time and time spent performing 1:1 timed codes.

## 2018-02-01 NOTE — MR AVS SNAPSHOT
After Visit Summary   2/1/2018    Evan Gregg    MRN: [id number]           Patient Information     Date Of Birth          1955        Visit Information        Provider Department      2/1/2018 10:00 AM Eleuterio Grace PT M [facility]        Today's Diagnoses     Shoulder pain, right    -  1    Right elbow pain           Follow-ups after your visit        Your next 10 appointments already scheduled     Feb 07, 2018  8:20 AM CST   [facility] Extremity with DONAVAN [facility] ([facility])    [address] [place] [id number] [phone]            Feb 15, 2018  8:40 AM CST   [facility] Extremity with WILBUR Sanders [facility] ([facility])    [address] [place] [address] [id number] [phone]            Mar 01, 2018  8:40 AM CST   [facility] Extremity with WILBUR Sanders [facility] ([facility])    [address] [place] [id number] [phone]            Mar 08, 2018  8:40 AM CST   [facility] Extremity with WILBUR Sanders [facility] ([facility])    [address] [place] [id number] [phone]              Who to contact     If you have questions or need follow up information about today's clinic visit or your schedule please contact [facility] directly at [phone].  Normal or non-critical lab and imaging results will be communicated to you by MyChart, letter or phone within 4 business days after the clinic has received the results. If you do not hear from us within 7 days, please contact the clinic through MyChart or phone. If you have a critical or abnormal lab result, we will notify you by phone as soon as possible.  Submit refill requests  through [employer] or call your pharmacy and they will forward the refill request to us. Please allow 3 business days for your refill to be completed.          Additional Information About Your Visit        [facility] Information     [employer] gives you secure access to your electronic health record. If you see a primary care provider, you can also send messages to your care team and make appointments. If you have questions, please call your primary care clinic.  If you do not have a primary care provider, please call [phone] and they will assist you.        Care EveryWhere ID     This is your Care EveryWhere ID. This could be used by other organizations to access your [facility] medical records  [id number]         Blood Pressure from Last 3 Encounters:   01/31/18 138/85   10/25/17 110/60   08/24/16 125/80    Weight from Last 3 Encounters:   01/31/18 95.3 kg (210 lb)   10/25/17 91.8 kg (202 lb 6.4 oz)   08/24/16 91.4 kg (201 lb 6.4 oz)              We Performed the Following     HC PT EVAL, LOW COMPLEXITY     [facility] INITIAL EVAL REPORT     THERAPEUTIC ACTIVITIES     THERAPEUTIC EXERCISES        Primary Care Provider Office Phone # Fax #    Brennan Pinead [phone] [address] [place], [place] [address]        Equal Access to Services     MACK MCKOY : Hadii aad ku hadasho Soomaali, waaxda luqadaha, qaybta kaalmada adeegyada, waxay jordon aragon. So [facility] [phone].    ATENCIÓN: Si habla español, tiene a guevara disposición servicios gratuitos de asistencia lingüística. Llame al [phone].    We comply with applicable federal civil rights laws and [place] laws. We do not discriminate on the basis of race, color, national origin, age, disability, sex, sexual orientation, or gender identity.            Thank you!     Thank you for choosing [facility]  for your care. Our goal is always to provide you with excellent care. Hearing back from our patients is  one way we can continue to improve our services. Please take a few minutes to complete the written survey that you may receive in the mail after your visit with us. Thank you!             Your Updated Medication List - Protect others around you: Learn how to safely use, store and throw away your medicines at www.disposemymeds.org.          This list is accurate as of 2/1/18  5:51 PM.  Always use your most recent med list.                   Brand Name Dispense Instructions for use Diagnosis    meloxicam 15 MG tablet    MOBIC    15 tablet    Take 1 tablet (15 mg) by mouth daily    Acute pain of right shoulder, Chronic pain of right elbow       metroNIDAZOLE 0.75 % topical gel    METROGEL    45 g    Apply pea-sized amount or less to cheeks and nose as needed.    Acne rosacea       * triamcinolone 0.1 % cream    KENALOG    480 g    Apply 1-2 times daily as needed for rash on hands and trunk    Dermatitis       * triamcinolone 0.025 % cream    KENALOG    240 g    mix 50/50 with Eucerin for eczema. (Patient will mix himself)    Dermatitis       * Notice:  This list has 2 medication(s) that are the same as other medications prescribed for you. Read the directions carefully, and ask your doctor or other care provider to review them with you.

## 2018-02-01 NOTE — PROGRESS NOTES
CHIEF COMPLAINT:  Consult (Right arm )       HISTORY OF PRESENT ILLNESS  Mr. Gregg is a pleasant 62 year old year old male who presents to clinic today with right anterior shoulder and elbow pain.  Evan explains that his primary concern today is right anterior shoulder.  He states that he has been experiencing pain with overhead motion as well as push-up and cross body motions.  This began several months ago at top of his shoulder as well as anteriorly at humerus.  Denies lateral shoulder pain about deltoid.  He also feels like he has been becoming weaker during his upper body workouts.   No radicular pain.     Treatments to date have included ibuprofen, relative rest which has been unsuccessful.  He has not had any surgeries to his shoulder or arm.    He does note clicking of his right elbow and stiffness.  No overt pain of the elbow however but he believes stiffness has made it more difficult for him to complete upper body workouts.    Additional history: History of arthritis of his hips, status post hip resurfacing.    MEDICAL HISTORY  Patient Active Problem List   Diagnosis     Rosacea     CARDIOVASCULAR SCREENING; LDL GOAL LESS THAN 160     Plantar wart of left foot     BCC (basal cell carcinoma), arm     Osteoarthritis of hip     Bilateral hip pain     Abnormal gait     Primary osteoarthritis of both hips     Hip joint stiffness, bilateral       Current Outpatient Prescriptions   Medication Sig Dispense Refill     meloxicam (MOBIC) 15 MG tablet Take 1 tablet (15 mg) by mouth daily 15 tablet 0     triamcinolone (KENALOG) 0.025 % cream mix 50/50 with Eucerin for eczema. (Patient will mix himself) 240 g 5     triamcinolone (KENALOG) 0.1 % cream Apply 1-2 times daily as needed for rash on hands and trunk 480 g 3     metroNIDAZOLE (METROGEL) 0.75 % gel Apply pea-sized amount or less to cheeks and nose as needed. 45 g 1       Allergies   Allergen Reactions     No Known Drug Allergies        Family History  "  Problem Relation Age of Onset     Thyroid Disease Mother      HEART DISEASE Father      heart attack     CANCER Father      skin cancer     Lipids Father      Hypertension Father      Cancer - colorectal Maternal Grandfather      Thyroid Disease Brother      CANCER Brother      skin ca     Social history: Patient is right-handed, works at the [employer], exercises regularly    Additional medical/Social/Surgical histories reviewed in [facility] and updated as appropriate.     REVIEW OF SYSTEMS (2/1/2018)  CONSTITUTIONAL: Denies fever and weight loss  EYES: Denies acute vision changes  ENT: Denies hearing changes or difficulty swallowing  CARDIAC: Denies chest pain or edema  RESPIRATORY: Denies dyspnea, cough or wheeze  GASTROINTESTINAL: Denies abdominal pain, nausea, vomiting  MUSCULOSKELETAL: See HPI  SKIN: Denies any recent rash or lesion  NEUROLOGICAL: Denies numbness or focal weakness  PSYCHIATRIC: No history of psychiatric symptoms or problems  ENDOCRINE: Denies current diagnosis of diabetes  HEMATOLOGY: Denies episodes of easy bleeding      PHYSICAL EXAM  /85  Pulse 58  Ht 1.778 m (5' 10\")  Wt 95.3 kg (210 lb)  BMI 30.13 kg/m2    General  - normal appearance, in no obvious distress  CV  - normal radial pulse  Pulm  - normal respiratory pattern, non-labored  Musculoskeletal - Right shoulder  - inspection: normal bone and joint alignment, no obvious deformity, no scapular winging, no AC step-off, mild swelling over AC joint, normal clavicle  - palpation: tender over AC joint, clavicle does not displace when pressed.  Additional tenderness to palpation at bicipital groove.    - ROM: Discomfort with passive flexion past 90 deg, painful cross body adduction  - strength: 5/5  strength, 5/5 in all shoulder planes  - special tests:  (+) crossarm  (-) Speed's  (-) Yergason's  (-) Neer  (-) Hawkin's  (-) Isidro  (-) [place]'s    Right elbow with full flexion, limited extension to about 20 degrees. " Decreased pronation and supination.  Mild tenderness at common extensor tendon.  Full painless elb isow flexion, extension 5/5, wrist extension 5/5, and long finger extension 5/5.      Neuro  - no sensory or motor deficit, grossly normal coordination, normal muscle tone  Skin  - no ecchymosis, erythema, warmth, or induration, no obvious rash  Psych  - interactive, appropriate, normal mood and affect    IMAGING : XR right shoulder, elbow. Final results and radiologist's interpretation, available in the [facility] health record. Images were reviewed with the patient/family members in the office today. My personal interpretation of the performed imaging is shoulder without glenohumeral osteoarthritis.  Moderate AC joint narrowing with osteophyte superiorly.       Right elbow with enthesopathic changes at olecranon, medial epicondyle.  Degenerative changes of elbow present.       ASSESSMENT & PLAN  Mr. Gregg is a 62 year old year old male who presents to clinic today with pain of his anterior  Shoulder and stiffness of right elbow.  Shoulder pain likely secondary to #1 AC joint osteoarthritis.  #2 mild biceps tendonitis.  We discussed initiating PT for shoulder, discussing resistance exercise limitations with therapist, and returning in 6 weeks to consider corticosteroid injection.    In regard to right elbow, degenerative changes are seen.  Initiate elbow physical therapy and avoid exacerbating motions.  Start voltaren x 1 week.  Consider MRI for evaluation of loose bodies /degenerative changes if persisting.    Diagnosis:   Acromioclavicular joint osteoarthritis of right shoulder  Mild biceps tendinitis of right shoulder  Right elbow osteoarthritis    F/u after physical therapy in 6 week    It was a pleasure seeing Evan today.    Levar Tracy DO, CAQSM  Primary Care Sports Medicine

## 2018-02-15 ENCOUNTER — THERAPY VISIT (OUTPATIENT)
Dept: PHYSICAL THERAPY | Facility: CLINIC | Age: 63
End: 2018-02-15
Payer: COMMERCIAL

## 2018-02-15 DIAGNOSIS — M25.511 SHOULDER PAIN, RIGHT: ICD-10-CM

## 2018-02-15 DIAGNOSIS — M25.521 RIGHT ELBOW PAIN: ICD-10-CM

## 2018-02-15 PROCEDURE — 97110 THERAPEUTIC EXERCISES: CPT | Mod: GP | Performed by: PHYSICAL THERAPIST

## 2018-02-15 PROCEDURE — 97530 THERAPEUTIC ACTIVITIES: CPT | Mod: GP | Performed by: PHYSICAL THERAPIST

## 2018-02-15 PROCEDURE — 97112 NEUROMUSCULAR REEDUCATION: CPT | Mod: GP | Performed by: PHYSICAL THERAPIST

## 2018-07-10 DIAGNOSIS — M25.551 PAIN IN RIGHT HIP: Primary | ICD-10-CM

## 2018-07-19 DIAGNOSIS — M25.551 PAIN IN RIGHT HIP: ICD-10-CM

## 2018-07-19 PROCEDURE — 82495 ASSAY OF CHROMIUM: CPT | Mod: 90 | Performed by: ORTHOPAEDIC SURGERY

## 2018-07-19 PROCEDURE — 36415 COLL VENOUS BLD VENIPUNCTURE: CPT | Performed by: ORTHOPAEDIC SURGERY

## 2018-07-19 PROCEDURE — 99000 SPECIMEN HANDLING OFFICE-LAB: CPT | Performed by: ORTHOPAEDIC SURGERY

## 2018-07-19 PROCEDURE — 83018 HEAVY METAL QUAN EACH NES: CPT | Mod: 90 | Performed by: ORTHOPAEDIC SURGERY

## 2018-07-22 LAB — COBALT SERPL-MCNC: 2.9 UG/L

## 2018-07-25 LAB — CR SERPL-MCNC: 3.9 UG/L

## 2018-08-22 ENCOUNTER — TRANSFERRED RECORDS (OUTPATIENT)
Dept: HEALTH INFORMATION MANAGEMENT | Facility: CLINIC | Age: 63
End: 2018-08-22

## 2019-02-21 ENCOUNTER — TRANSFERRED RECORDS (OUTPATIENT)
Dept: HEALTH INFORMATION MANAGEMENT | Facility: CLINIC | Age: 64
End: 2019-02-21

## 2019-03-05 ENCOUNTER — TRANSFERRED RECORDS (OUTPATIENT)
Dept: HEALTH INFORMATION MANAGEMENT | Facility: CLINIC | Age: 64
End: 2019-03-05

## 2019-03-18 ENCOUNTER — OFFICE VISIT (OUTPATIENT)
Dept: ORTHOPEDICS | Facility: CLINIC | Age: 64
End: 2019-03-18
Payer: COMMERCIAL

## 2019-03-18 ENCOUNTER — ANCILLARY PROCEDURE (OUTPATIENT)
Dept: GENERAL RADIOLOGY | Facility: CLINIC | Age: 64
End: 2019-03-18
Attending: FAMILY MEDICINE
Payer: COMMERCIAL

## 2019-03-18 VITALS — BODY MASS INDEX: 29.78 KG/M2 | HEART RATE: 76 BPM | HEIGHT: 71 IN | WEIGHT: 212.7 LBS

## 2019-03-18 DIAGNOSIS — R07.81 RIB PAIN ON RIGHT SIDE: ICD-10-CM

## 2019-03-18 DIAGNOSIS — R07.81 RIB PAIN ON RIGHT SIDE: Primary | ICD-10-CM

## 2019-03-18 ASSESSMENT — MIFFLIN-ST. JEOR: SCORE: 1787.31

## 2019-03-18 NOTE — PROGRESS NOTES
SPORTS & ORTHOPEDIC WALK-IN VISIT 3/18/2019    Primary Care Physician: Dr. Brennan Pineda    He works at the [employer] and last Wednesday he slipped on the ice and fell on his right side.  He stated that he has pain in the side when he moves, sneezes or sleeps on it.  He denies any pain anywhere else.       Reason for visit:     What part of your body is injured / painful?  right ribs    What caused the injury /pain? Fall    How long ago did your injury occur or pain begin? several days ago    What are your most bothersome symptoms? Pain    How would you characterize your symptom?  Noticeable     What makes your symptoms better? Rest    What makes your symptoms worse? Movement and Other: sleeping and sneezing     Have you been previously seen for this problem? No    Medical History:    Any recent changes to your medical history? No    Any new medication prescribed since last visit? No    Have you had surgery on this body part before? 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 No    Do you have any vision problems? No    Do you have any chest pain or edema? No    Do you have any shortness of breath or wheezing?  No    Do you have stomach problems? No    Do you have any numbness or focal weakness? No    Do you have diabetes? No    Do you have problems with bleeding or clotting? No    Do you have an rashes or other skin lesions? No             PMH:  Active problem list:  Patient Active Problem List   Diagnosis     Rosacea     CARDIOVASCULAR SCREENING; LDL GOAL LESS THAN 160     Plantar wart of left foot     BCC (basal cell carcinoma), arm     Osteoarthritis of hip     Shoulder pain, right     Right elbow pain       FH:  Family History   Problem Relation Age of Onset     Thyroid Disease Mother      Heart Disease Father         heart attack      Cancer Father         skin cancer     Lipids Father      Hypertension Father      Cancer - colorectal Maternal Grandfather      Thyroid Disease Brother      Cancer Brother         skin ca       SH:  Social History     Socioeconomic History     Marital status:      Spouse name: Not on file     Number of children: Not on file     Years of education: Not on file     Highest education level: Not on file   Occupational History     Not on file   Social Needs     Financial resource strain: Not on file     Food insecurity:     Worry: Not on file     Inability: Not on file     Transportation needs:     Medical: Not on file     Non-medical: Not on file   Tobacco Use     Smoking status: Never Smoker     Smokeless tobacco: Never Used   Substance and Sexual Activity     Alcohol use: Yes     Comment: ocass     Drug use: No     Sexual activity: Yes     Partners: Female     Birth control/protection: Surgical   Lifestyle     Physical activity:     Days per week: Not on file     Minutes per session: Not on file     Stress: Not on file   Relationships     Social connections:     Talks on phone: Not on file     Gets together: Not on file     Attends Yarsani service: Not on file     Active member of club or organization: Not on file     Attends meetings of clubs or organizations: Not on file     Relationship status: Not on file     Intimate partner violence:     Fear of current or ex partner: Not on file     Emotionally abused: Not on file     Physically abused: Not on file     Forced sexual activity: Not on file   Other Topics Concern     Parent/sibling w/ CABG, MI or angioplasty before 65F 55M? Yes   Social History Narrative     Not on file       MEDS:  See EMR, reviewed  ALL:  See EMR, reviewed    REVIEW OF SYSTEMS:  CONSTITUTIONAL:NEGATIVE for fever, chills, change in weight  INTEGUMENTARY/SKIN: NEGATIVE for worrisome rashes, moles or lesions  EYES: NEGATIVE for vision changes or irritation  ENT/MOUTH: NEGATIVE for ear,  mouth and throat problems  RESP:NEGATIVE for significant cough or SOB  BREAST: NEGATIVE for masses, tenderness or discharge  CV: NEGATIVE for chest pain, palpitations or peripheral edema  GI: NEGATIVE for nausea, abdominal pain, heartburn, or change in bowel habits  :NEGATIVE for frequency, dysuria, or hematuria  :NEGATIVE for frequency, dysuria, or hematuria  NEURO: NEGATIVE for weakness, dizziness or paresthesias  ENDOCRINE: NEGATIVE for temperature intolerance, skin/hair changes  HEME/ALLERGY/IMMUNE: NEGATIVE for bleeding problems  PSYCHIATRIC: NEGATIVE for changes in mood or affect          Objective: The skin overlying the rib cage is normal.  He has some mild discomfort in the inter-rib space along the mid axillary line on the right at approximately the sixth or seventh thoracic rib.  Lateral compression of the rib cage is without discomfort anterior posterior compression reproduces some right-sided rib discomfort.  A rib hooking maneuver on the right is negative.  Abdomen is soft with no hepatosplenomegaly, guarding or rigidity.  Chest is clear to auscultation in all fields anteriorly and posteriorly.  Spring testing the thoracic spine reproduces some rib discomfort on the right.  Appropriate in conversation and affect.    An x-ray of the rib cage and chest shows no signs of pneumothorax and no signs of rib fracture    Assessment: Right-sided rib injury    Plan: He will look for improvement with the symptoms over the next 4-6 weeks.  Tylenol as needed for comfort.  He will need to work around this and his athletic protocol to find exercise sizes that are tolerated.  Some suggestions discussed.  We outlined options in the future for manual therapy for the thoracic spine if the problem is persistent but he will look for a healing of the injury over the next 4 weeks.

## 2019-03-18 NOTE — LETTER
RE: Evan Gregg  [address] [facility] [address]     Dear Colleague,    Thank you for referring your patient, Evan Gregg, to the [facility] SPORTS AND ORTHOPAEDIC WALK IN CLINIC at [facility]. Please see a copy of my visit note below.          SPORTS & ORTHOPEDIC WALK-IN VISIT 3/18/2019    Primary Care Physician: Dr. Brennan Pineda    He works at the [employer] and last Wednesday he slipped on the ice and fell on his right side.  He stated that he has pain in the side when he moves, sneezes or sleeps on it.  He denies any pain anywhere else.       Reason for visit:     What part of your body is injured / painful?  right ribs    What caused the injury /pain? Fall    How long ago did your injury occur or pain begin? several days ago    What are your most bothersome symptoms? Pain    How would you characterize your symptom?  Noticeable     What makes your symptoms better? Rest    What makes your symptoms worse? Movement and Other: sleeping and sneezing     Have you been previously seen for this problem? No    Medical History:    Any recent changes to your medical history? No    Any new medication prescribed since last visit? No    Have you had surgery on this body part before? 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 [employer]    Handedness: Right    Exercise: kettlebell, strength training, and cardio     PMH:  Active problem list:  Patient Active Problem List   Diagnosis     Rosacea     CARDIOVASCULAR SCREENING; LDL GOAL LESS THAN 160     Plantar wart of left foot     BCC (basal cell carcinoma), arm     Osteoarthritis of hip     Shoulder pain, right     Right elbow pain       FH:  Family History   Problem Relation Age of Onset     Thyroid Disease Mother      Heart Disease Father         heart attack     Cancer Father         skin cancer     Lipids Father      Hypertension Father      Cancer - colorectal Maternal Grandfather       Thyroid Disease Brother      Cancer Brother         skin ca       SH:  Social History     Socioeconomic History     Marital status:      Spouse name: Not on file     Number of children: Not on file     Years of education: Not on file     Highest education level: Not on file   Occupational History     Not on file   Social Needs     Financial resource strain: Not on file     Food insecurity:     Worry: Not on file     Inability: Not on file     Transportation needs:     Medical: Not on file     Non-medical: Not on file   Tobacco Use     Smoking status: Never Smoker     Smokeless tobacco: Never Used   Substance and Sexual Activity     Alcohol use: Yes     Comment: ocass     Drug use: No     Sexual activity: Yes     Partners: Female     Birth control/protection: Surgical   Lifestyle     Physical activity:     Days per week: Not on file     Minutes per session: Not on file     Stress: Not on file   Relationships     Social connections:     Talks on phone: Not on file     Gets together: Not on file     Attends [employer] service: Not on file     Active member of club or organization: Not on file     Attends meetings of clubs or organizations: Not on file     Relationship status: Not on file     Intimate partner violence:     Fear of current or ex partner: Not on file     Emotionally abused: Not on file     Physically abused: Not on file     Forced sexual activity: Not on file   Other Topics Concern     Parent/sibling w/ CABG, MI or angioplasty before 65F 55M? Yes   Social History Narrative     Not on file     MEDS:  See EMR, reviewed  ALL:  See EMR, reviewed    Objective: The skin overlying the rib cage is normal.  He has some mild discomfort in the inter-rib space along the mid axillary line on the right at approximately the sixth or seventh thoracic rib.  Lateral compression of the rib cage is without discomfort anterior posterior compression reproduces some right-sided rib discomfort.  A rib hooking maneuver on  the right is negative.  Abdomen is soft with no hepatosplenomegaly, guarding or rigidity.  Chest is clear to auscultation in all fields anteriorly and posteriorly.  Spring testing the thoracic spine reproduces some rib discomfort on the right.  Appropriate in conversation and affect.    An x-ray of the rib cage and chest shows no signs of pneumothorax and no signs of rib fracture    Assessment: Right-sided rib injury    Plan: He will look for improvement with the symptoms over the next 4-6 weeks.  Tylenol as needed for comfort.  He will need to work around this and his athletic protocol to find exercise sizes that are tolerated.  Some suggestions discussed.  We outlined options in the future for manual therapy for the thoracic spine if the problem is persistent but he will look for a healing of the injury over the next 4 weeks.    Again, thank you for allowing me to participate in the care of your patient.      Sincerely,    Gene Bhat MD

## 2019-03-28 ENCOUNTER — TRANSFERRED RECORDS (OUTPATIENT)
Dept: HEALTH INFORMATION MANAGEMENT | Facility: CLINIC | Age: 64
End: 2019-03-28

## 2019-05-29 ENCOUNTER — HOSPITAL ENCOUNTER (OUTPATIENT)
Facility: CLINIC | Age: 64
End: 2019-05-29
Attending: PODIATRIST | Admitting: PODIATRIST
Payer: COMMERCIAL

## 2019-07-23 ENCOUNTER — HOSPITAL ENCOUNTER (EMERGENCY)
Facility: CLINIC | Age: 64
Discharge: HOME | End: 2019-07-23
Attending: FAMILY MEDICINE | Admitting: FAMILY MEDICINE
Payer: COMMERCIAL

## 2019-07-23 ENCOUNTER — APPOINTMENT (OUTPATIENT)
Dept: CT IMAGING | Facility: CLINIC | Age: 64
End: 2019-07-23
Attending: FAMILY MEDICINE
Payer: COMMERCIAL

## 2019-07-23 VITALS
TEMPERATURE: 99.1 F | RESPIRATION RATE: 20 BRPM | SYSTOLIC BLOOD PRESSURE: 157 MMHG | BODY MASS INDEX: 29.53 KG/M2 | WEIGHT: 210 LBS | HEART RATE: 63 BPM | DIASTOLIC BLOOD PRESSURE: 91 MMHG | OXYGEN SATURATION: 97 %

## 2019-07-23 DIAGNOSIS — S09.90XA CLOSED HEAD INJURY, INITIAL ENCOUNTER: ICD-10-CM

## 2019-07-23 DIAGNOSIS — R10.32 ABDOMINAL WALL PAIN IN BOTH LOWER QUADRANTS: ICD-10-CM
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
## 2019-07-23 DIAGNOSIS — M54.2 CERVICALGIA: ICD-10-CM

## 2019-07-23 DIAGNOSIS — M54.6 ACUTE RIGHT-SIDED THORACIC BACK PAIN: ICD-10-CM

## 2019-07-23 DIAGNOSIS — R10.31 ABDOMINAL WALL PAIN IN BOTH LOWER QUADRANTS: ICD-10-CM

## 2019-07-23 PROCEDURE — 25000125 ZZHC RX 250: Performed by: FAMILY MEDICINE

## 2019-07-23 PROCEDURE — 74177 CT ABD & PELVIS W/CONTRAST: CPT

## 2019-07-23 PROCEDURE — 70450 CT HEAD/BRAIN W/O DYE: CPT

## 2019-07-23 PROCEDURE — 72125 CT NECK SPINE W/O DYE: CPT

## 2019-07-23 PROCEDURE — 25000128 H RX IP 250 OP 636: Performed by: FAMILY MEDICINE

## 2019-07-23 PROCEDURE — 99284 EMERGENCY DEPT VISIT MOD MDM: CPT | Mod: Z6 | Performed by: FAMILY MEDICINE

## 2019-07-23 PROCEDURE — 71260 CT THORAX DX C+: CPT

## 2019-07-23 PROCEDURE — 99285 EMERGENCY DEPT VISIT HI MDM: CPT | Mod: 25 | Performed by: FAMILY MEDICINE

## 2019-07-23 RX ORDER — IOPAMIDOL 755 MG/ML
100 INJECTION, SOLUTION INTRAVASCULAR ONCE
Status: COMPLETED | OUTPATIENT
Start: 2019-07-23 | End: 2019-07-23

## 2019-07-23 RX ORDER — OXYCODONE AND ACETAMINOPHEN 5; 325 MG/1; MG/1
1-2 TABLET ORAL EVERY 4 HOURS PRN
Qty: 12 TABLET | Refills: 0 | Status: SHIPPED | OUTPATIENT
Start: 2019-07-23 | End: 2019-10-11

## 2019-07-23 RX ADMIN — SODIUM CHLORIDE 75 ML: 9 INJECTION, SOLUTION INTRAVENOUS at 21:05

## 2019-07-23 RX ADMIN — IOPAMIDOL 100 ML: 755 INJECTION, SOLUTION INTRAVENOUS at 21:05

## 2019-07-23 NOTE — ED AVS SNAPSHOT
[facility] Emergency Department  [address] [address]  Phone:  [phone]  Fax:  [phone]                                    Evan Gregg   MRN: [id number]    Department:  [facility] Emergency Department   Date of Visit:  7/23/2019           After Visit Summary Signature Page    I have received my discharge instructions, and my questions have been answered. I have discussed any challenges I see with this plan with the nurse or doctor.    ..........................................................................................................................................  Patient/Patient Representative Signature      ..........................................................................................................................................  Patient Representative Print Name and Relationship to Patient    ..................................................               ................................................  Date                                   Time    ..........................................................................................................................................  Reviewed by Signature/Title    ...................................................              ..............................................  Date                                               Time          22EPIC Rev 08/18

## 2019-07-24 NOTE — DISCHARGE INSTRUCTIONS
Rest ice as needed to traumatized areas for the next couple of days.  Tylenol/ibuprofen recommended as baseline pain medication.  Percocet as prescribed for breakthrough pain and to be used on a sparing basis only for the next couple of days.  Return to the emergency department if worse or if pain does not begin to improve after the first 2 to 3 days as we discussed.
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
## 2019-07-25 ENCOUNTER — OFFICE VISIT (OUTPATIENT)
Dept: FAMILY MEDICINE | Facility: CLINIC | Age: 64
End: 2019-07-25
Payer: COMMERCIAL

## 2019-07-25 VITALS
HEIGHT: 71 IN | DIASTOLIC BLOOD PRESSURE: 78 MMHG | SYSTOLIC BLOOD PRESSURE: 120 MMHG | BODY MASS INDEX: 29.68 KG/M2 | RESPIRATION RATE: 16 BRPM | TEMPERATURE: 98.2 F | HEART RATE: 70 BPM | WEIGHT: 212 LBS

## 2019-07-25 DIAGNOSIS — Z13.6 CARDIOVASCULAR SCREENING; LDL GOAL LESS THAN 160: ICD-10-CM

## 2019-07-25 DIAGNOSIS — I25.10 ATHEROSCLEROSIS OF NATIVE CORONARY ARTERY OF NATIVE HEART WITHOUT ANGINA PECTORIS: Primary | ICD-10-CM

## 2019-07-25 DIAGNOSIS — Z13.1 SCREENING FOR DIABETES MELLITUS: ICD-10-CM

## 2019-07-25 LAB
CHOLEST SERPL-MCNC: 179 MG/DL
GLUCOSE SERPL-MCNC: 97 MG/DL (ref 70–99)
HDLC SERPL-MCNC: 51 MG/DL
LDLC SERPL CALC-MCNC: 107 MG/DL
NONHDLC SERPL-MCNC: 128 MG/DL
TRIGL SERPL-MCNC: 106 MG/DL

## 2019-07-25 PROCEDURE — 99213 OFFICE O/P EST LOW 20 MIN: CPT | Performed by: FAMILY MEDICINE

## 2019-07-25 PROCEDURE — 82947 ASSAY GLUCOSE BLOOD QUANT: CPT | Performed by: FAMILY MEDICINE

## 2019-07-25 PROCEDURE — 80061 LIPID PANEL: CPT | Performed by: FAMILY MEDICINE

## 2019-07-25 PROCEDURE — 36415 COLL VENOUS BLD VENIPUNCTURE: CPT | Performed by: FAMILY MEDICINE

## 2019-07-25 ASSESSMENT — MIFFLIN-ST. JEOR: SCORE: 1778.99

## 2019-07-25 NOTE — PATIENT INSTRUCTIONS
ASSESSMENT:   (I25.10) Atherosclerosis of native coronary artery of native heart without angina pectoris  (primary encounter diagnosis)  Comment: CT scan abnormal with signs of some narrowing of coronary arteries but no symptoms of heart disease now or in the past in an active person.  Never a smoker.   Plan: CARDIOLOGY EVAL ADULT REFERRAL        Check blood tests for diabetes mellitus and lipids.   Schedule an appointment with cardiology for consultation to see if they recommend any testing.  Call [phone] to schedule.    (Z13.6) CARDIOVASCULAR SCREENING; LDL GOAL LESS THAN 160  Comment:   Plan: Lipid panel reflex to direct LDL Non-fasting        Non-fasting blood tests.     (Z13.1) Screening for diabetes mellitus  Comment:   Plan: Glucose

## 2019-07-25 NOTE — NURSING NOTE
"Chief Complaint   Patient presents with     Results     Seen in ED 07/23/2019 and told abnormal finding on CT of chest.       Initial /78   Pulse 70   Temp 98.2  F (36.8  C) (Tympanic)   Resp 16   Ht 1.796 m (5' 10.7\")   Wt 96.2 kg (212 lb)   BMI 29.82 kg/m   Estimated body mass index is 29.82 kg/m  as calculated from the following:    Height as of this encounter: 1.796 m (5' 10.7\").    Weight as of this encounter: 96.2 kg (212 lb).    Patient presents to the clinic using     Health Maintenance that is potentially due pending provider review:          Is there anyone who you would like to be able to receive your results? No  If yes have patient fill out FADUMO    "
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
## 2019-07-26 NOTE — TELEPHONE ENCOUNTER
RECORDS RECEIVED FROM: Internal   DATE RECEIVED: 7-31   NOTES STATUS DETAILS   OFFICE NOTE from referring provider    Internal 7-25 Dr. Pineda   OFFICE NOTE from other cardiologist    N/A    DISCHARGE SUMMARY from hospital    N/A    DISCHARGE REPORT from the ER   Internal    OPERATIVE REPORT    N/A    MEDICATION LIST   Internal    LABS     BMP   N/A    CBC   N/A    CMP   N/A    Lipids   Internal 7-25-19   TSH   N/A    DIAGNOSTIC PROCEDURES     EKG   N/A    Monitor Reports   N/A    IMAGING (DISC & REPORT)      Echo   N/A    Stress Tests   N/A    Cath   N/A    MRI/MRA   N/A    CT/CTA   Internal

## 2019-07-26 NOTE — RESULT ENCOUNTER NOTE
Hi John,  Lipids show mildly increased LDL.   The blood glucose, a test for diabetes mellitus is in the normal range.  New cholesterol guidelines (from AHA/ACC pooled risk calculation) estimates 10 year risk of having a heart attack at about 8.2%.  Any risk over 7.5% is considered significant and statin type medication is recommended to prevent heart attacks.       You have mildly increased risk for heart attacks in the next 10 years.  You could reduce the risk by a couple percentage points by taking cholesterol lowering medication like atorvastatin.  With having coronary artery calcification on your CT, this may be a good thing to do.   I could start you on medication if you like.  Let my staff know and we can send prescription.    If you prefer to wait to talk with cardiology first, you could do that.   MARLON BOWER MD     The 10-year ASCVD risk score (Cher LEOS Jr., et al., 2013) is: 8.2%    Values used to calculate the score:      Age: 64 years      Sex: Male      Is Non- : No      Diabetic: No      Tobacco smoker: No      Systolic Blood Pressure: 120 mmHg      Is BP treated: No      HDL Cholesterol: 51 mg/dL      Total Cholesterol: 179 mg/dL

## 2019-07-28 ASSESSMENT — ENCOUNTER SYMPTOMS
POLYPHAGIA: 0
INSOMNIA: 1
MUSCLE CRAMPS: 0
LIGHT-HEADEDNESS: 1
MYALGIAS: 1
NERVOUS/ANXIOUS: 1
HYPOTENSION: 0
CHILLS: 0
INCREASED ENERGY: 0
ORTHOPNEA: 0
PALPITATIONS: 0
PANIC: 0
EXERCISE INTOLERANCE: 0
POLYDIPSIA: 0
JOINT SWELLING: 0
NIGHT SWEATS: 0
SLEEP DISTURBANCES DUE TO BREATHING: 0
ALTERED TEMPERATURE REGULATION: 0
DEPRESSION: 0
HALLUCINATIONS: 0
HYPERTENSION: 0
WEIGHT GAIN: 0
FEVER: 0
DECREASED APPETITE: 0
NECK PAIN: 1
BACK PAIN: 1
LEG PAIN: 0
MUSCLE WEAKNESS: 0
DECREASED CONCENTRATION: 0
SYNCOPE: 0
STIFFNESS: 1
ARTHRALGIAS: 1
WEIGHT LOSS: 0
FATIGUE: 0

## 2019-07-30 ENCOUNTER — TELEPHONE (OUTPATIENT)
Dept: FAMILY MEDICINE | Facility: CLINIC | Age: 64
End: 2019-07-30

## 2019-07-30 ENCOUNTER — TELEPHONE (OUTPATIENT)
Dept: EMERGENCY MEDICINE | Facility: CLINIC | Age: 64
End: 2019-07-30

## 2019-07-30 DIAGNOSIS — M54.2 NECK PAIN: Primary | ICD-10-CM
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
## 2019-07-31 ENCOUNTER — PRE VISIT (OUTPATIENT)
Dept: CARDIOLOGY | Facility: CLINIC | Age: 64
End: 2019-07-31

## 2019-07-31 ENCOUNTER — OFFICE VISIT (OUTPATIENT)
Dept: CARDIOLOGY | Facility: CLINIC | Age: 64
End: 2019-07-31
Attending: INTERNAL MEDICINE
Payer: COMMERCIAL

## 2019-07-31 VITALS
SYSTOLIC BLOOD PRESSURE: 127 MMHG | DIASTOLIC BLOOD PRESSURE: 76 MMHG | HEIGHT: 70 IN | OXYGEN SATURATION: 96 % | WEIGHT: 208.5 LBS | HEART RATE: 61 BPM | BODY MASS INDEX: 29.85 KG/M2

## 2019-07-31 DIAGNOSIS — I25.10 CORONARY ARTERY DISEASE INVOLVING NATIVE CORONARY ARTERY OF NATIVE HEART WITHOUT ANGINA PECTORIS: Primary | ICD-10-CM

## 2019-07-31 DIAGNOSIS — I65.29 CAROTID ARTERY CALCIFICATION, UNSPECIFIED LATERALITY: ICD-10-CM

## 2019-07-31 DIAGNOSIS — R07.9 CHEST PAIN, UNSPECIFIED TYPE: ICD-10-CM

## 2019-07-31 PROCEDURE — 93010 ELECTROCARDIOGRAM REPORT: CPT | Mod: ZP | Performed by: INTERNAL MEDICINE

## 2019-07-31 PROCEDURE — 99203 OFFICE O/P NEW LOW 30 MIN: CPT | Mod: 25 | Performed by: INTERNAL MEDICINE

## 2019-07-31 PROCEDURE — 93005 ELECTROCARDIOGRAM TRACING: CPT | Mod: ZF

## 2019-07-31 PROCEDURE — G0463 HOSPITAL OUTPT CLINIC VISIT: HCPCS | Mod: 25,ZF

## 2019-07-31 ASSESSMENT — PAIN SCALES - GENERAL: PAINLEVEL: NO PAIN (0)

## 2019-07-31 ASSESSMENT — MIFFLIN-ST. JEOR: SCORE: 1752

## 2019-07-31 NOTE — NURSING NOTE
Chief Complaint   Patient presents with     New Patient     New Pt Visit     Vitals were taken and medications were reconciled. EKG was performed    Sirisha Bhat BRIANA  10:40 AM

## 2019-07-31 NOTE — NURSING NOTE
Cardiac Testing: Patient given instructions regarding  stress echocardiogram . Discussed purpose, preparation, procedure and when to expect results reported back to the patient. Patient demonstrated understanding of this information and agreed to call with further questions or concerns.  Med Reconcile: Reviewed and verified all current medications with the patient. The updated medication list was printed and given to the patient.  New Medication: Patient was educated regarding newly prescribed medication, including discussion of  the indication, administration, side effects, and when to report to MD or RN. Patient demonstrated understanding of this information and agreed to call with further questions or concerns.  Return Appointment: Patient given instructions regarding scheduling next clinic visit. Patient demonstrated understanding of this information and agreed to call with further questions or concerns.  Patient stated he understood all health information given and agreed to call with further questions or concerns.    Venice Garcia LPN

## 2019-07-31 NOTE — PATIENT INSTRUCTIONS
Patient Instructions:  It was a pleasure to see you in the cardiology clinic today.      If you have any questions, you can reach my nurse, Venice CHEEMA LPN, at [phone].  Press Option #1 for the [facility], and then press Option #4 for nursing.    We are encouraging the use of [employer] to communicate with your HealthCare Provider    Medication Changes: Start Aspirin 81 mg every day.    Recommendations: None.    Studies Ordered:   - Exercise Stress Echocardiogram  - Bilateral Carotid Ultrasound    Stress Echocardiogram    Stress echocardiography is also called stress echo. It is a test that records pictures of your heart before and after you exercise. During activity, your heart has to work harder to send oxygen-rich blood throughout your body. This test helps your healthcare provider see whether your heart is working well when you are active.   Follow these instructions:    1.  Do not take this medication/beta blocker the day before the test and the day of the test.  You can take your medication after the procedure is over.    2.  No alcohol, smoking or caffeine 12 hours before the procedure.  3.  Nothing by mouth 3 hours before the start of the procedure.    3.  Please wear comfortable clothing and walking/athletic shoes as you will be walking on a treadmill/bike.     The results from today include: EKG.    Please follow up: With Dr. Kamara based on results of testing.    Sincerely,    Dante Kamara MD     If you have an urgent need after hours (8:00 am to 4:30 pm) please call [phone] and ask for the cardiology fellow on call.
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
## 2019-07-31 NOTE — TELEPHONE ENCOUNTER
Please call.  Referral placed.   If you have not heard from the scheduling office within 2 business days, please call [phone] for all locations, with the exception of [place], please call [phone] and [place], please call [phone].  MARLON BOWER MD

## 2019-07-31 NOTE — LETTER
7/31/2019      RE: Evan Gregg  [address] [facility] [address]       Dear Colleague,    Thank you for the opportunity to participate in the care of your patient, Evan Gregg, at the [facility]. Please see a copy of my visit note below.    [id number]      Please do not hesitate to contact me if you have any questions/concerns.     Sincerely,     Dante Kamara MD

## 2019-08-01 LAB — INTERPRETATION ECG - MUSE: NORMAL
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
## 2019-08-06 ENCOUNTER — ANCILLARY PROCEDURE (OUTPATIENT)
Dept: CARDIOLOGY | Facility: CLINIC | Age: 64
End: 2019-08-06
Attending: INTERNAL MEDICINE
Payer: COMMERCIAL

## 2019-08-06 ENCOUNTER — ANCILLARY PROCEDURE (OUTPATIENT)
Dept: ULTRASOUND IMAGING | Facility: CLINIC | Age: 64
End: 2019-08-06
Attending: INTERNAL MEDICINE
Payer: COMMERCIAL

## 2019-08-06 DIAGNOSIS — R07.9 CHEST PAIN, UNSPECIFIED TYPE: ICD-10-CM

## 2019-08-06 DIAGNOSIS — I25.10 CORONARY ARTERY DISEASE INVOLVING NATIVE CORONARY ARTERY OF NATIVE HEART WITHOUT ANGINA PECTORIS: ICD-10-CM

## 2019-08-06 DIAGNOSIS — I65.29 CAROTID ARTERY CALCIFICATION, UNSPECIFIED LATERALITY: ICD-10-CM

## 2019-08-06 RX ADMIN — Medication 5 ML: at 09:04

## 2019-08-08 ENCOUNTER — HOSPITAL ENCOUNTER (OUTPATIENT)
Dept: PHYSICAL THERAPY | Facility: CLINIC | Age: 64
Setting detail: THERAPIES SERIES
End: 2019-08-08
Attending: FAMILY MEDICINE
Payer: COMMERCIAL

## 2019-08-08 DIAGNOSIS — M54.2 NECK PAIN: ICD-10-CM

## 2019-08-08 PROCEDURE — 97110 THERAPEUTIC EXERCISES: CPT | Mod: GP | Performed by: PHYSICAL THERAPIST

## 2019-08-08 PROCEDURE — 97162 PT EVAL MOD COMPLEX 30 MIN: CPT | Mod: GP | Performed by: PHYSICAL THERAPIST
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
## 2019-08-11 ENCOUNTER — MYC MEDICAL ADVICE (OUTPATIENT)
Dept: FAMILY MEDICINE | Facility: CLINIC | Age: 64
End: 2019-08-11

## 2019-08-11 DIAGNOSIS — G47.00 INSOMNIA, UNSPECIFIED TYPE: Primary | ICD-10-CM

## 2019-08-11 DIAGNOSIS — R41.89 COGNITIVE CHANGES: ICD-10-CM

## 2019-08-12 NOTE — TELEPHONE ENCOUNTER
I talked with John and told him that Dr Pineda is out of the office until 8/19/19.  He will schedule appointment to see an eye doctor and will get ears checked.  He is asking for referral for statement #4 and #5 in his My Chart note.  Lucy Weldon RN

## 2019-08-15 ENCOUNTER — HOSPITAL ENCOUNTER (OUTPATIENT)
Dept: PHYSICAL THERAPY | Facility: CLINIC | Age: 64
Setting detail: THERAPIES SERIES
End: 2019-08-15
Attending: FAMILY MEDICINE
Payer: COMMERCIAL

## 2019-08-15 ENCOUNTER — MYC MEDICAL ADVICE (OUTPATIENT)
Dept: FAMILY MEDICINE | Facility: CLINIC | Age: 64
End: 2019-08-15

## 2019-08-15 DIAGNOSIS — H93.19 RINGING IN EARS: Primary | ICD-10-CM

## 2019-08-15 PROCEDURE — 97140 MANUAL THERAPY 1/> REGIONS: CPT | Mod: GP | Performed by: PHYSICAL THERAPIST
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
## 2019-08-22 ENCOUNTER — HOSPITAL ENCOUNTER (OUTPATIENT)
Dept: PHYSICAL THERAPY | Facility: CLINIC | Age: 64
Setting detail: THERAPIES SERIES
End: 2019-08-22
Attending: FAMILY MEDICINE
Payer: COMMERCIAL

## 2019-08-22 PROCEDURE — 97110 THERAPEUTIC EXERCISES: CPT | Mod: GP | Performed by: PHYSICAL THERAPIST

## 2019-08-22 PROCEDURE — 97140 MANUAL THERAPY 1/> REGIONS: CPT | Mod: GP | Performed by: PHYSICAL THERAPIST

## 2019-08-26 ENCOUNTER — MYC MEDICAL ADVICE (OUTPATIENT)
Dept: FAMILY MEDICINE | Facility: CLINIC | Age: 64
End: 2019-08-26

## 2019-08-26 DIAGNOSIS — H93.19 RINGING IN EARS: Primary | ICD-10-CM

## 2019-08-29 DIAGNOSIS — H91.90 HEARING LOSS, UNSPECIFIED HEARING LOSS TYPE, UNSPECIFIED LATERALITY: Primary | ICD-10-CM

## 2019-08-30 ENCOUNTER — PRE VISIT (OUTPATIENT)
Dept: OTOLARYNGOLOGY | Facility: CLINIC | Age: 64
End: 2019-08-30

## 2019-08-30 ENCOUNTER — OFFICE VISIT (OUTPATIENT)
Dept: AUDIOLOGY | Facility: CLINIC | Age: 64
End: 2019-08-30
Attending: OTOLARYNGOLOGY
Payer: COMMERCIAL

## 2019-08-30 ENCOUNTER — OFFICE VISIT (OUTPATIENT)
Dept: OTOLARYNGOLOGY | Facility: CLINIC | Age: 64
End: 2019-08-30
Payer: COMMERCIAL

## 2019-08-30 VITALS
DIASTOLIC BLOOD PRESSURE: 86 MMHG | SYSTOLIC BLOOD PRESSURE: 138 MMHG | WEIGHT: 207 LBS | HEART RATE: 65 BPM | BODY MASS INDEX: 29.7 KG/M2

## 2019-08-30 DIAGNOSIS — H91.90 HEARING LOSS, UNSPECIFIED HEARING LOSS TYPE, UNSPECIFIED LATERALITY: ICD-10-CM

## 2019-08-30 DIAGNOSIS — H90.3 SENSORINEURAL HEARING LOSS, BILATERAL: Primary | ICD-10-CM

## 2019-08-30 DIAGNOSIS — H90.3 SENSORINEURAL HEARING LOSS (SNHL) OF BOTH EARS: ICD-10-CM

## 2019-08-30 DIAGNOSIS — H93.13 TINNITUS, BILATERAL: Primary | ICD-10-CM

## 2019-08-30 ASSESSMENT — PATIENT HEALTH QUESTIONNAIRE - PHQ9: SUM OF ALL RESPONSES TO PHQ QUESTIONS 1-9: 15

## 2019-08-30 ASSESSMENT — PAIN SCALES - GENERAL: PAINLEVEL: NO PAIN (0)
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
## 2019-08-30 NOTE — PROGRESS NOTES
AUDIOLOGY REPORT    SUMMARY: Audiology visit completed. See audiogram for results.      RECOMMENDATIONS: Follow-up with ENT.    Elbert Tejeda, [facility]  Licensed Audiologist  [place] #[id number]

## 2019-08-30 NOTE — NURSING NOTE
Chief Complaint   Patient presents with     Consult     Bilateral tinnitus     Blood pressure 138/86, pulse 65, weight 93.9 kg (207 lb).    Lorena Simon, EMT    Depression Response    Patient completed the PHQ-9 assessment for depression and scored >9? Yes  Question 9 on the PHQ-9 was positive for suicidality? No  Is the patient already receiving treatment for depression? Yes  Patient would like to speak with behavioral health team ([facility] only)? No    I personally notified the following: clinic nurse    Behavioral Health/Social Work Contact Information     [facility]  Eder Grewal MA, LMFT  Lead Behavioral Health Clinician  Phone: [phone]  [facility] Pager: [phone]    Non-[facility]  [facility] On[facility]   Pager: [phone]
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
## 2019-09-05 ENCOUNTER — HOSPITAL ENCOUNTER (OUTPATIENT)
Dept: PHYSICAL THERAPY | Facility: CLINIC | Age: 64
Setting detail: THERAPIES SERIES
End: 2019-09-05
Attending: FAMILY MEDICINE
Payer: COMMERCIAL

## 2019-09-05 PROCEDURE — 97140 MANUAL THERAPY 1/> REGIONS: CPT | Mod: GP | Performed by: PHYSICAL THERAPIST

## 2019-09-05 PROCEDURE — 97110 THERAPEUTIC EXERCISES: CPT | Mod: GP | Performed by: PHYSICAL THERAPIST

## 2019-09-18 DIAGNOSIS — R07.9 CHEST PAIN, UNSPECIFIED TYPE: Primary | ICD-10-CM

## 2019-09-18 DIAGNOSIS — I25.119 CORONARY ARTERY DISEASE INVOLVING NATIVE CORONARY ARTERY OF NATIVE HEART WITH ANGINA PECTORIS (H): ICD-10-CM

## 2019-09-18 NOTE — PROGRESS NOTES
Date: 9/18/2019    Time of Call: 1:53 PM     Diagnosis: Coronary Calcifications on CT, Chest Pain     [ TORB ] Ordering provider: Dr Dante Kamara  Order: Coronary CTA     Order received by: Venice Garcia LPN     Follow-up/additional notes: Per progress note addendum.

## 2019-09-23 ENCOUNTER — PRE VISIT (OUTPATIENT)
Dept: NEUROLOGY | Facility: CLINIC | Age: 64
End: 2019-09-23

## 2019-09-23 NOTE — TELEPHONE ENCOUNTER
FUTURE VISIT INFORMATION      FUTURE VISIT INFORMATION:    Date: 10/11/2019    Time: 8AM    Location: [facility]  REFERRAL INFORMATION:    Referring provider: Dr. Pineda     Referring providers clinic:  [facility]     Reason for visit/diagnosis  Cognitive Changes     RECORDS REQUESTED FROM:       Clinic name Comments Records Status Imaging Status   [facility] Everywhere  N/A

## 2019-09-24 ENCOUNTER — THERAPY VISIT (OUTPATIENT)
Dept: PHYSICAL THERAPY | Facility: CLINIC | Age: 64
End: 2019-09-24
Payer: COMMERCIAL

## 2019-09-24 DIAGNOSIS — M25.551 HIP PAIN, BILATERAL: Primary | ICD-10-CM

## 2019-09-24 DIAGNOSIS — M25.552 HIP PAIN, BILATERAL: Primary | ICD-10-CM

## 2019-09-24 PROCEDURE — 97530 THERAPEUTIC ACTIVITIES: CPT | Mod: GP | Performed by: PHYSICAL THERAPIST

## 2019-09-24 PROCEDURE — 97161 PT EVAL LOW COMPLEX 20 MIN: CPT | Mod: GP | Performed by: PHYSICAL THERAPIST

## 2019-09-24 NOTE — PROGRESS NOTES
"KEY PT FINDINGS:  1) Lacking mobility in all planes/directions  2) Mild strength deficits in ER and IR - ASSESS FLEXION NEXT  3) Hip dominant movement patterns    Physical Therapy Initial Evaluation: Subjective History     Injury/Condition Details:  Presenting Complaint Bilateral hip soreness/stiffness   Onset Timing/Date DOS: 2016 (hip resurfacing)  Increased symptoms within the past year    Mechanism Top two goals:   Lateral soreness/tightness  Quadriceps soreness after long runs (>3 miles)    \"The right leg doesn't feel steady\"     Does not take any pain meds (occasionally takes ibuprofen)     Hip resurfacing in 2016 (9 weeks apart, June/August)    Went to PT at [facility] and did the [facility] Hip Program (?)     Patient provided an extensive list of complaints and symptoms he experiences throughout the day. It has been scanned into patient's chart. Sx include \"clunking\", \"slipping of the joint\", soreness, stiffness.      Symptom Behavior Details    Primary Symptoms Constant symptoms; worsen with activity, pain (Location: Lateral hip pain - over greater trochanter, Quality: Aching/Throbbing), stiffness, weakness   Worst Pain 4/10 (with see above)   Symptom Provocators Increased activities, see scanned sheet for full provoking activities   Best Pain 1/10    Symptom Relievers Activity modification   Time of day dependent? Worse in evening after activity and Stiffness in morning   Recent symptom change? symptoms worsening     Prior Testing/Intervention for current condition:  Prior Tests  x-ray and MRI not in the system   Prior Treatment PT  and Surgery(ies): hip resurfacing     Lifestyle & General Medical History:  Employment [employer]   Usual physical activities  (within past year) Stretches 6-7x/week, foam rolling occasionally, lacrosse ball  Strength    Orthopaedic history See Epic Chart   Notable medical history See Epic Chart   Patient Reported Health good       Lower Extremity Physical " Therapy Examination    Dynamic Movement Screen:  2 leg stance: Equal weight bearing  2 leg squat: Hip dominant strategy, toes up during the squat    1 leg stance: Right: Proprio deficits, neutral trunk posture; Left: Proprio deficits, neutral trunk posture  1 leg squat:   Right: Proprio deficits, continued hip dominant strategy  Left: Proprio deficits, continued hip dominant strategy     Gait: Short step length, lacking hip extension, when cued for extra long strides - patient performs long strides with knee flexed position, does not demonstrate any lumbar, pelvis or hip extension.              Hip Joint ROM   Flex ER IR ABD ADD   Right 95 deg 25 deg 14 deg 30 deg 15 deg   Left 95 deg 22 deg 30 deg 30 deg 15 deg     Lower Extremity Muscle Strength (x/5)   Hip IR Hip ER Knee Ext Hip ABD Hip Ext Knee Flex   Right  4+/5 4+/5 5/5 4+/5 4+/5 5/5   Left 4+/5 4+/5 5/5 4+/5 4+/5 5/5     Basic Muscle Activation:  Transversus Abdominus: Mild deficits of core control with DL bridge marching  Quadriceps: Right: Normal, Left: Normal  Gluteal: Right: Normal, Left: Normal     Lower Extremity Flexibility Screen:  Hamstrings (Supine SLR): Right: -; Left: -  Gastroc (Supine Active DF): Right: +; Left: +  Quadriceps (Prone KF): Right: +; Left: +  Hip Flexors (Brennan Test): Right: +; Left: +  ITB (Jamison Test): Right: +; Left: +    Hip Special Testing:  Test Right Left   FADDIR (-) (-)   JANETT (-) (-)   Log Roll (ER) (-) (-)   Resisted SLR (-) (-)     Resisted Movement Testing:  Test Right Left   Adduction Squeeze (-) (-)   Psoas (-) (-)   TFL (-) (-)   ABD (-) (-)     Hip Palpation:  Tender to palpation at: None      Assessment/Plan:  Patient is a 64 year old male with both sides hip complaints.    Patient has the following significant findings with corresponding treatment plan.                Diagnosis 1:  Hip stiffness and soreness  Pain -  hot/cold therapy, manual therapy, STS, splint/taping/bracing/orthotics, self management and  education  Decreased ROM/flexibility - manual therapy, therapeutic exercise and home program  Decreased joint mobility - manual therapy, therapeutic exercise and home program  Decreased strength - therapeutic exercise, therapeutic activities and home program  Impaired balance - neuro re-education, therapeutic activities and home program  Decreased proprioception - neuro re-education, therapeutic activities and home program  Impaired muscle performance - neuro re-education and home program  Decreased function - therapeutic activities and home program    Therapy Evaluation Codes:   1) History comprised of:   Personal factors that impact the plan of care:      None.    Comorbidity factors that impact the plan of care are:      None.     Medications impacting care: None.  2) Examination of Body Systems comprised of:   Body structures and functions that impact the plan of care:      Hip.   Activity limitations that impact the plan of care are:      Lifting, Running, Sports, Squatting/kneeling, Stairs and Walking.  3) Clinical presentation characteristics are:   Stable/Uncomplicated.  4) Decision-Making    Low complexity using standardized patient assessment instrument and/or measureable assessment of functional outcome.  Cumulative Therapy Evaluation is: Low complexity.    Previous and current functional limitations:  (See Goal Flow Sheet for this information)    Short term and Long term goals: (See Goal Flow Sheet for this information)     Communication ability:  Patient appears to be able to clearly communicate and understand verbal and written communication and follow directions correctly.  Treatment Explanation - The following has been discussed with the patient:   RX ordered/plan of care  Anticipated outcomes  Possible risks and side effects  This patient would benefit from PT intervention to resume normal activities.   Rehab potential is good.    Frequency:  1 X week, once daily  Duration:  for 2 weeks then  transition to 2 x month for 2 months  Discharge Plan:  Achieve all LTG.  Independent in home treatment program.  Reach maximal therapeutic benefit.    Please refer to the daily flowsheet for treatment today, total treatment time and time spent performing 1:1 timed codes.

## 2019-09-24 NOTE — LETTER
"[facility]  [address] [address]  [phone]    2019  Re: Evan Gregg   :   1955  MRN:  [id number]   REFERRING PHYSICIAN:   Scott Duane Anseth MD    [facility] PHYSICAL THERAPY [facility]    Date of Initial Evaluation:  19  Visits:  Rxs Used: 1  Reason for Referral:  Hip pain, bilateral    KEY PT FINDINGS:  1) Lacking mobility in all planes/directions  2) Mild strength deficits in ER and IR - ASSESS FLEXION NEXT  3) Hip dominant movement patterns    Physical Therapy Initial Evaluation: Subjective History     Injury/Condition Details:  Presenting Complaint Bilateral hip soreness/stiffness   Onset Timing/Date DOS: 2016 (hip resurfacing)  Increased symptoms within the past year    Mechanism Top two goals:   Lateral soreness/tightness  Quadriceps soreness after long runs (>3 miles)    \"The right leg doesn't feel steady\"     Does not take any pain meds (occasionally takes ibuprofen)     Hip resurfacing in 2016 (9 weeks apart, /August)    Went to PT at [facility] and did the [facility] Hip Program (?)     Patient provided an extensive list of complaints and symptoms he experiences throughout the day. It has been scanned into patient's chart. Sx include \"clunking\", \"slipping of the joint\", soreness, stiffness.      Symptom Behavior Details    Primary Symptoms Constant symptoms; worsen with activity, pain (Location: Lateral hip pain - over greater trochanter, Quality: Aching/Throbbing), stiffness, weakness   Worst Pain 4/10 (with see above)   Symptom Provocators Increased activities, see scanned sheet for full provoking activities   Best Pain 1/10    Symptom Relievers Activity modification   Time of day dependent? Worse in evening after activity and Stiffness in morning   Recent symptom change? symptoms worsening         Re: Evan SIEGEL [place]   :   1955    Prior Testing/Intervention for current condition:  Prior Tests  x-ray " and MRI not in the system   Prior Treatment PT  and Surgery(ies): hip resurfacing     Lifestyle & General Medical History:  Employment [employer]   Usual physical activities  (within past year) Stretches 6-7x/week, foam rolling occasionally, lacrosse ball  Strength    Orthopaedic history See Epic Chart   Notable medical history See Epic Chart   Patient Reported Health good       Lower Extremity Physical Therapy Examination  Dynamic Movement Screen:  2 leg stance: Equal weight bearing  2 leg squat: Hip dominant strategy, toes up during the squat  1 leg stance: Right: Proprio deficits, neutral trunk posture; Left: Proprio deficits, neutral trunk posture  1 leg squat:   Right: Proprio deficits, continued hip dominant strategy  Left: Proprio deficits, continued hip dominant strategy   Gait: Short step length, lacking hip extension, when cued for extra long strides - patient performs long strides with knee flexed position, does not demonstrate any lumbar, pelvis or hip extension.     Hip Joint ROM   Flex ER IR ABD ADD   Right 95 deg 25 deg 14 deg 30 deg 15 deg   Left 95 deg 22 deg 30 deg 30 deg 15 deg     Lower Extremity Muscle Strength (x/5)   Hip IR Hip ER Knee Ext Hip ABD Hip Ext Knee Flex   Right  4+/5 4+/5 5/5 4+/5 4+/5 5/5   Left 4+/5 4+/5 5/5 4+/5 4+/5 5/5     Basic Muscle Activation:  Transversus Abdominus: Mild deficits of core control with DL bridge marching  Quadriceps: Right: Normal, Left: Normal  Gluteal: Right: Normal, Left: Normal     Lower Extremity Flexibility Screen:  Hamstrings (Supine SLR): Right: -; Left: -  Gastroc (Supine Active DF): Right: +; Left: +  Quadriceps (Prone KF): Right: +; Left: +  Hip Flexors (Brennan Test): Right: +; Left: +  ITB (Jamison Test): Right: +; Left: +    Hip Special Testing:  Test Right Left   FADDIR (-) (-)   JANETT (-) (-)   Log Roll (ER) (-) (-)   Resisted SLR (-) (-)     Re: Evan Gregg   :   1955    Resisted Movement Testing:  Test Right  Left   Adduction Squeeze (-) (-)   Psoas (-) (-)   TFL (-) (-)   ABD (-) (-)     Hip Palpation:  Tender to palpation at: None  Assessment/Plan:  Patient is a 64 year old male with both sides hip complaints.    Patient has the following significant findings with corresponding treatment plan.                Diagnosis 1:  Hip stiffness and soreness  Pain -  hot/cold therapy, manual therapy, STS, splint/taping/bracing/orthotics, self management and education  Decreased ROM/flexibility - manual therapy, therapeutic exercise and home program  Decreased joint mobility - manual therapy, therapeutic exercise and home program  Decreased strength - therapeutic exercise, therapeutic activities and home program  Impaired balance - neuro re-education, therapeutic activities and home program  Decreased proprioception - neuro re-education, therapeutic activities and home program  Impaired muscle performance - neuro re-education and home program  Decreased function - therapeutic activities and home program  Therapy Evaluation Codes:   1) History comprised of:   Personal factors that impact the plan of care:      None.    Comorbidity factors that impact the plan of care are:      None.     Medications impacting care: None.  2) Examination of Body Systems comprised of:   Body structures and functions that impact the plan of care:      Hip.   Activity limitations that impact the plan of care are:      Lifting, Running, Sports, Squatting/kneeling, Stairs and Walking.  3) Clinical presentation characteristics are:   Stable/Uncomplicated.  4) Decision-Making    Low complexity using standardized patient assessment instrument and/or measureable assessment of functional outcome.  Cumulative Therapy Evaluation is: Low complexity.  Previous and current functional limitations:  (See Goal Flow Sheet for this information)    Short term and Long term goals: (See Goal Flow Sheet for this information)   Communication ability:  Patient appears to be  able to clearly communicate and understand verbal and written communication and follow directions correctly.  Treatment Explanation - The following has been discussed with the patient:   RX ordered/plan of care  Anticipated outcomes  Possible risks and side effects  This patient would benefit from PT intervention to resume normal activities.   Rehab potential is good.  Frequency:  1 X week, once daily  Duration:  for 2 weeks then transition to 2 x month for 2 months  Discharge Plan:  Achieve all LTG.  Independent in home treatment program.  Reach maximal therapeutic benefit.      Re: Evan Gregg   :   1955    Please refer to the daily flowsheet for treatment today, total treatment time and time spent performing 1:1 timed codes.     Thank you for your referral.    INQUIRIES  Therapist: Petty Gooden DPT [facility] PHYSICAL THERAPY [address] [address]  Phone: [phone]
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
## 2019-09-30 ENCOUNTER — OFFICE VISIT (OUTPATIENT)
Dept: OPHTHALMOLOGY | Facility: CLINIC | Age: 64
End: 2019-09-30
Attending: OPHTHALMOLOGY
Payer: COMMERCIAL

## 2019-09-30 DIAGNOSIS — H52.4 PRESBYOPIA: ICD-10-CM

## 2019-09-30 DIAGNOSIS — S06.9X0A TRAUMATIC BRAIN INJURY, WITHOUT LOSS OF CONSCIOUSNESS, INITIAL ENCOUNTER (H): Primary | ICD-10-CM

## 2019-09-30 DIAGNOSIS — H52.203 MYOPIC ASTIGMATISM OF BOTH EYES: ICD-10-CM

## 2019-09-30 DIAGNOSIS — H52.13 MYOPIC ASTIGMATISM OF BOTH EYES: ICD-10-CM

## 2019-09-30 DIAGNOSIS — Z03.89 ENCOUNTER FOR OBSERVATION FOR OTHER SUSPECTED DISEASES AND CONDITIONS RULED OUT: ICD-10-CM

## 2019-09-30 PROCEDURE — 92134 CPTRZ OPH DX IMG PST SGM RTA: CPT | Mod: ZF | Performed by: OPHTHALMOLOGY

## 2019-09-30 PROCEDURE — 92015 DETERMINE REFRACTIVE STATE: CPT | Mod: ZF

## 2019-09-30 PROCEDURE — G0463 HOSPITAL OUTPT CLINIC VISIT: HCPCS | Mod: ZF

## 2019-09-30 ASSESSMENT — CUP TO DISC RATIO
OS_RATIO: 0.45
OD_RATIO: 0.5

## 2019-09-30 ASSESSMENT — VISUAL ACUITY
OD_PH_CC+: -1
CORRECTION_TYPE: GLASSES
METHOD: SNELLEN - LINEAR
OD_PH_CC: 20/20
METHOD: SNELLEN - LINEAR
OD_SC: J1
OS_CC: 20/20
OS_SC: J1
OD_SC: 20/50
OS_SC: 20/30
OD_CC+: -1
OS_CC+: -1
METHOD_MR: PT REQUEST MR TODAY
OD_CC: 20/50

## 2019-09-30 ASSESSMENT — TONOMETRY
OD_IOP_MMHG: 13
OS_IOP_MMHG: 17
IOP_METHOD: TONOPEN

## 2019-09-30 ASSESSMENT — REFRACTION_MANIFEST
OS_CYLINDER: +1.75
OS_AXIS: 005
OD_ADD: +2.50
OD_AXIS: 020
OS_ADD: +2.50
OD_CYLINDER: +1.75
OS_SPHERE: -2.25
OD_SPHERE: -1.00

## 2019-09-30 ASSESSMENT — EXTERNAL EXAM - LEFT EYE: OS_EXAM: NORMAL

## 2019-09-30 ASSESSMENT — CONF VISUAL FIELD
OD_NORMAL: 1
OS_NORMAL: 1
METHOD: COUNTING FINGERS

## 2019-09-30 ASSESSMENT — REFRACTION_WEARINGRX
OS_AXIS: 170
OD_AXIS: 010
OD_SPHERE: -2.00
OS_SPHERE: -2.25
OD_CYLINDER: +1.50
SPECS_TYPE: SVL
OS_CYLINDER: +1.50

## 2019-09-30 ASSESSMENT — SLIT LAMP EXAM - LIDS
COMMENTS: NORMAL
COMMENTS: NORMAL

## 2019-09-30 ASSESSMENT — EXTERNAL EXAM - RIGHT EYE: OD_EXAM: NORMAL
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
## 2019-10-02 ENCOUNTER — THERAPY VISIT (OUTPATIENT)
Dept: PHYSICAL THERAPY | Facility: CLINIC | Age: 64
End: 2019-10-02
Payer: COMMERCIAL

## 2019-10-02 DIAGNOSIS — M25.552 HIP PAIN, BILATERAL: ICD-10-CM

## 2019-10-02 DIAGNOSIS — M25.551 HIP PAIN, BILATERAL: ICD-10-CM

## 2019-10-02 PROCEDURE — 97140 MANUAL THERAPY 1/> REGIONS: CPT | Mod: GP | Performed by: PHYSICAL THERAPIST

## 2019-10-02 PROCEDURE — 97110 THERAPEUTIC EXERCISES: CPT | Mod: GP | Performed by: PHYSICAL THERAPIST

## 2019-10-03 ENCOUNTER — TRANSFERRED RECORDS (OUTPATIENT)
Dept: HEALTH INFORMATION MANAGEMENT | Facility: CLINIC | Age: 64
End: 2019-10-03

## 2019-10-07 ENCOUNTER — TELEPHONE (OUTPATIENT)
Dept: ORTHOPEDICS | Facility: CLINIC | Age: 64
End: 2019-10-07
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
## 2019-10-08 ENCOUNTER — PRE VISIT (OUTPATIENT)
Dept: ORTHOPEDICS | Facility: CLINIC | Age: 64
End: 2019-10-08

## 2019-10-10 ENCOUNTER — OFFICE VISIT (OUTPATIENT)
Dept: OPTOMETRY | Facility: CLINIC | Age: 64
End: 2019-10-10
Payer: COMMERCIAL

## 2019-10-10 DIAGNOSIS — H52.4 PRESBYOPIA: ICD-10-CM

## 2019-10-10 DIAGNOSIS — F07.81 POSTCONCUSSION SYNDROME: Primary | ICD-10-CM

## 2019-10-10 ASSESSMENT — ENCOUNTER SYMPTOMS
SPEECH CHANGE: 1
WEAKNESS: 0
HOARSE VOICE: 0
SINUS CONGESTION: 0
EYE WATERING: 0
TASTE DISTURBANCE: 0
MYALGIAS: 1
MUSCLE CRAMPS: 0
INCREASED ENERGY: 1
HEADACHES: 1
CHILLS: 0
DECREASED APPETITE: 0
ALTERED TEMPERATURE REGULATION: 0
POLYDIPSIA: 0
FEVER: 0
DOUBLE VISION: 0
JOINT SWELLING: 0
DECREASED CONCENTRATION: 1
SINUS PAIN: 0
WEIGHT LOSS: 0
TROUBLE SWALLOWING: 0
INSOMNIA: 1
MEMORY LOSS: 1
EYE REDNESS: 0
NECK PAIN: 1
SEIZURES: 0
MUSCLE WEAKNESS: 0
DEPRESSION: 1
NIGHT SWEATS: 0
TINGLING: 0
DISTURBANCES IN COORDINATION: 0
POLYPHAGIA: 0
NUMBNESS: 0
FATIGUE: 1
ARTHRALGIAS: 1
TREMORS: 0
EYE PAIN: 0
EYE IRRITATION: 0
PANIC: 0
DIZZINESS: 1
NECK MASS: 0
HALLUCINATIONS: 0
BACK PAIN: 1
NERVOUS/ANXIOUS: 1
STIFFNESS: 1
LOSS OF CONSCIOUSNESS: 0
SMELL DISTURBANCE: 0
PARALYSIS: 0
WEIGHT GAIN: 0
SORE THROAT: 0

## 2019-10-10 ASSESSMENT — REFRACTION_MANIFEST
OD_AXIS: 015
OD_CYLINDER: +1.75
OS_ADD: +2.25
OD_SPHERE: -1.00
OS_AXIS: 180
OS_SPHERE: -1.75
OS_CYLINDER: +1.25
OD_ADD: +2.25

## 2019-10-11 ENCOUNTER — OFFICE VISIT (OUTPATIENT)
Dept: ORTHOPEDICS | Facility: CLINIC | Age: 64
End: 2019-10-11
Payer: COMMERCIAL

## 2019-10-11 ENCOUNTER — OFFICE VISIT (OUTPATIENT)
Dept: NEUROLOGY | Facility: CLINIC | Age: 64
End: 2019-10-11
Attending: FAMILY MEDICINE
Payer: COMMERCIAL

## 2019-10-11 ENCOUNTER — ANCILLARY PROCEDURE (OUTPATIENT)
Dept: GENERAL RADIOLOGY | Facility: CLINIC | Age: 64
End: 2019-10-11
Attending: PEDIATRICS
Payer: COMMERCIAL

## 2019-10-11 VITALS
WEIGHT: 207 LBS | DIASTOLIC BLOOD PRESSURE: 78 MMHG | SYSTOLIC BLOOD PRESSURE: 128 MMHG | HEIGHT: 70 IN | BODY MASS INDEX: 29.63 KG/M2

## 2019-10-11 VITALS
OXYGEN SATURATION: 97 % | WEIGHT: 207.8 LBS | HEART RATE: 60 BPM | DIASTOLIC BLOOD PRESSURE: 74 MMHG | SYSTOLIC BLOOD PRESSURE: 125 MMHG | BODY MASS INDEX: 29.82 KG/M2

## 2019-10-11 DIAGNOSIS — M25.521 RIGHT ELBOW PAIN: ICD-10-CM

## 2019-10-11 DIAGNOSIS — M54.2 NECK PAIN, CHRONIC: ICD-10-CM

## 2019-10-11 DIAGNOSIS — G89.29 NECK PAIN, CHRONIC: ICD-10-CM

## 2019-10-11 DIAGNOSIS — G89.29 CHRONIC RIGHT-SIDED THORACIC BACK PAIN: Primary | ICD-10-CM

## 2019-10-11 DIAGNOSIS — M54.50 CHRONIC RIGHT-SIDED LOW BACK PAIN WITHOUT SCIATICA: ICD-10-CM

## 2019-10-11 DIAGNOSIS — G89.29 CHRONIC RIGHT-SIDED LOW BACK PAIN WITHOUT SCIATICA: ICD-10-CM

## 2019-10-11 DIAGNOSIS — R41.89 COGNITIVE CHANGES: ICD-10-CM

## 2019-10-11 DIAGNOSIS — F07.81 POST CONCUSSION SYNDROME: Primary | ICD-10-CM

## 2019-10-11 DIAGNOSIS — F43.10 PTSD (POST-TRAUMATIC STRESS DISORDER): ICD-10-CM

## 2019-10-11 DIAGNOSIS — M54.6 CHRONIC RIGHT-SIDED THORACIC BACK PAIN: Primary | ICD-10-CM

## 2019-10-11 PROCEDURE — 99204 OFFICE O/P NEW MOD 45 MIN: CPT | Performed by: PEDIATRICS

## 2019-10-11 PROCEDURE — 72100 X-RAY EXAM L-S SPINE 2/3 VWS: CPT

## 2019-10-11 PROCEDURE — 72040 X-RAY EXAM NECK SPINE 2-3 VW: CPT

## 2019-10-11 PROCEDURE — 73080 X-RAY EXAM OF ELBOW: CPT | Mod: RT

## 2019-10-11 ASSESSMENT — MIFFLIN-ST. JEOR: SCORE: 1745.2

## 2019-10-11 ASSESSMENT — PAIN SCALES - GENERAL: PAINLEVEL: MILD PAIN (3)
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
## 2019-10-11 NOTE — NURSING NOTE
Chief Complaint   Patient presents with     Consult     [facility] NEW - CONGNITIVE CHANGES       Candelario Larios, EMT
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
## 2019-10-11 NOTE — PATIENT INSTRUCTIONS
Plan:  - Today's Plan of Care:  Will review course with PT  MRI of the Cervical Spine - Call [phone] to schedule MRI  Elbow X-Rays    -We also discussed other future treatment options:  Referral to Pain Management  Referral to Occupational Therapy for elbow, MRI for elbow    Follow Up: In clinic with Dr. Lopez after MRI (wait at least 1-2 days)    If you have any further questions for your physician or physician s care team you can call [phone] and use option 3 to leave a voice message. Calls received during business hours will be returned same day.
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
## 2019-10-16 ENCOUNTER — THERAPY VISIT (OUTPATIENT)
Dept: PHYSICAL THERAPY | Facility: CLINIC | Age: 64
End: 2019-10-16
Payer: COMMERCIAL

## 2019-10-16 DIAGNOSIS — M25.551 HIP PAIN, BILATERAL: ICD-10-CM

## 2019-10-16 DIAGNOSIS — M25.552 HIP PAIN, BILATERAL: ICD-10-CM

## 2019-10-16 PROCEDURE — 97112 NEUROMUSCULAR REEDUCATION: CPT | Mod: GP | Performed by: PHYSICAL THERAPIST

## 2019-10-16 PROCEDURE — 97140 MANUAL THERAPY 1/> REGIONS: CPT | Mod: GP | Performed by: PHYSICAL THERAPIST

## 2019-10-16 PROCEDURE — 97110 THERAPEUTIC EXERCISES: CPT | Mod: GP | Performed by: PHYSICAL THERAPIST

## 2019-10-21 ASSESSMENT — SLIT LAMP EXAM - LIDS
COMMENTS: NORMAL
COMMENTS: NORMAL

## 2019-10-21 ASSESSMENT — TONOMETRY
IOP_METHOD: ICARE
OD_IOP_MMHG: 15
OS_IOP_MMHG: 16

## 2019-10-21 ASSESSMENT — CONF VISUAL FIELD
OS_NORMAL: 1
OD_NORMAL: 1

## 2019-10-21 ASSESSMENT — VISUAL ACUITY
CORRECTION_TYPE: GLASSES
METHOD: SNELLEN - LINEAR
OS_CC: 20/25
OD_CC: 20/50

## 2019-10-21 ASSESSMENT — CUP TO DISC RATIO
OS_RATIO: 0.45
OD_RATIO: 0.5

## 2019-10-21 ASSESSMENT — EXTERNAL EXAM - LEFT EYE: OS_EXAM: NORMAL

## 2019-10-21 ASSESSMENT — EXTERNAL EXAM - RIGHT EYE: OD_EXAM: NORMAL
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
## 2019-10-23 ENCOUNTER — HOSPITAL ENCOUNTER (OUTPATIENT)
Dept: MRI IMAGING | Facility: CLINIC | Age: 64
Discharge: HOME | End: 2019-10-23
Attending: PEDIATRICS | Admitting: PEDIATRICS
Payer: COMMERCIAL

## 2019-10-23 DIAGNOSIS — M54.6 CHRONIC RIGHT-SIDED THORACIC BACK PAIN: ICD-10-CM

## 2019-10-23 DIAGNOSIS — G89.29 CHRONIC RIGHT-SIDED THORACIC BACK PAIN: ICD-10-CM

## 2019-10-23 DIAGNOSIS — M54.2 NECK PAIN, CHRONIC: ICD-10-CM

## 2019-10-23 DIAGNOSIS — G89.29 NECK PAIN, CHRONIC: ICD-10-CM

## 2019-10-23 DIAGNOSIS — M25.521 RIGHT ELBOW PAIN: ICD-10-CM

## 2019-10-23 PROCEDURE — 72141 MRI NECK SPINE W/O DYE: CPT

## 2019-10-28 ENCOUNTER — TELEPHONE (OUTPATIENT)
Dept: FAMILY MEDICINE | Facility: CLINIC | Age: 64
End: 2019-10-28

## 2019-10-28 NOTE — TELEPHONE ENCOUNTER
Reason for Call:  Form, our goal is to have forms completed with 72 hours, however, some forms may require a visit or additional information.    Type of letter, form or note:  [employer]  [employer]    Who is the form from?: Patient    Where did the form come from: form was mailed in    What clinic location was the form placed at?: [place]    Where the form was placed: Given to physician    What number is listed as a contact on the form?: No Number Listed         Call taken on 10/28/2019 at 2:20 PM by Natalee Hu

## 2019-10-30 ENCOUNTER — THERAPY VISIT (OUTPATIENT)
Dept: PHYSICAL THERAPY | Facility: CLINIC | Age: 64
End: 2019-10-30
Payer: COMMERCIAL

## 2019-10-30 DIAGNOSIS — M25.552 HIP PAIN, BILATERAL: ICD-10-CM

## 2019-10-30 DIAGNOSIS — M25.551 HIP PAIN, BILATERAL: ICD-10-CM

## 2019-10-30 PROCEDURE — 97112 NEUROMUSCULAR REEDUCATION: CPT | Mod: GP | Performed by: PHYSICAL THERAPIST

## 2019-10-30 PROCEDURE — 97530 THERAPEUTIC ACTIVITIES: CPT | Mod: GP | Performed by: PHYSICAL THERAPIST

## 2019-10-30 PROCEDURE — 97110 THERAPEUTIC EXERCISES: CPT | Mod: GP | Performed by: PHYSICAL THERAPIST

## 2019-10-31 ENCOUNTER — OFFICE VISIT (OUTPATIENT)
Dept: OTOLARYNGOLOGY | Facility: CLINIC | Age: 64
End: 2019-10-31
Payer: COMMERCIAL

## 2019-10-31 ENCOUNTER — OFFICE VISIT (OUTPATIENT)
Dept: AUDIOLOGY | Facility: CLINIC | Age: 64
End: 2019-10-31
Attending: OTOLARYNGOLOGY
Payer: COMMERCIAL

## 2019-10-31 VITALS
WEIGHT: 205 LBS | DIASTOLIC BLOOD PRESSURE: 77 MMHG | RESPIRATION RATE: 28 BRPM | TEMPERATURE: 98.7 F | SYSTOLIC BLOOD PRESSURE: 126 MMHG | HEART RATE: 48 BPM | OXYGEN SATURATION: 98 % | HEIGHT: 70 IN | BODY MASS INDEX: 29.35 KG/M2

## 2019-10-31 DIAGNOSIS — H90.3 SENSORINEURAL HEARING LOSS (SNHL) OF BOTH EARS: ICD-10-CM

## 2019-10-31 DIAGNOSIS — H93.13 TINNITUS, BILATERAL: Primary | ICD-10-CM

## 2019-10-31 DIAGNOSIS — H90.3 SENSORINEURAL HEARING LOSS, BILATERAL: Primary | ICD-10-CM

## 2019-10-31 DIAGNOSIS — H93.13 TINNITUS, BILATERAL: ICD-10-CM

## 2019-10-31 ASSESSMENT — PAIN SCALES - GENERAL: PAINLEVEL: MILD PAIN (3)

## 2019-10-31 ASSESSMENT — MIFFLIN-ST. JEOR: SCORE: 1743.61

## 2019-10-31 NOTE — NURSING NOTE
"Chief Complaint   Patient presents with     RECHECK     bilateral tinnitus      Blood pressure 126/77, pulse (!) 48, temperature 98.7  F (37.1  C), resp. rate 28, height 1.79 m (5' 10.47\"), weight 93 kg (205 lb), SpO2 98 %.    Ronak Malave LPN    "

## 2019-10-31 NOTE — PROGRESS NOTES
AUDIOLOGY REPORT    SUMMARY: Audiology visit completed. See audiogram for results.      RECOMMENDATIONS: Follow-up with ENT.      Guillermina Arora.  Licensed Audiologist  [place] #[id number]
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
## 2019-11-03 ENCOUNTER — HEALTH MAINTENANCE LETTER (OUTPATIENT)
Age: 64
End: 2019-11-03

## 2019-11-06 ENCOUNTER — TELEPHONE (OUTPATIENT)
Dept: ORTHOPEDICS | Facility: CLINIC | Age: 64
End: 2019-11-06

## 2019-11-06 ENCOUNTER — OFFICE VISIT (OUTPATIENT)
Dept: PHYSICAL MEDICINE AND REHAB | Facility: CLINIC | Age: 64
End: 2019-11-06
Payer: COMMERCIAL

## 2019-11-06 VITALS
SYSTOLIC BLOOD PRESSURE: 130 MMHG | HEIGHT: 70 IN | OXYGEN SATURATION: 99 % | BODY MASS INDEX: 29.35 KG/M2 | DIASTOLIC BLOOD PRESSURE: 68 MMHG | WEIGHT: 205 LBS | HEART RATE: 53 BPM

## 2019-11-06 DIAGNOSIS — G47.9 SLEEP DISTURBANCE: ICD-10-CM

## 2019-11-06 DIAGNOSIS — F43.10 PTSD (POST-TRAUMATIC STRESS DISORDER): ICD-10-CM

## 2019-11-06 DIAGNOSIS — G44.329 CHRONIC POST-TRAUMATIC HEADACHE, NOT INTRACTABLE: ICD-10-CM

## 2019-11-06 DIAGNOSIS — R45.86 MOOD DISTURBANCE: ICD-10-CM
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
## 2019-11-06 DIAGNOSIS — S06.0X0A CONCUSSION WITHOUT LOSS OF CONSCIOUSNESS, INITIAL ENCOUNTER: ICD-10-CM

## 2019-11-06 DIAGNOSIS — F07.81 POST CONCUSSION SYNDROME: ICD-10-CM

## 2019-11-06 RX ORDER — LANOLIN ALCOHOL/MO/W.PET/CERES
3 CREAM (GRAM) TOPICAL
Qty: 30 TABLET | Refills: 0 | Status: SHIPPED | OUTPATIENT
Start: 2019-11-06 | End: 2020-02-07

## 2019-11-06 RX ORDER — BUTALBITAL, ACETAMINOPHEN AND CAFFEINE 50; 325; 40 MG/1; MG/1; MG/1
1 TABLET ORAL DAILY PRN
Qty: 14 TABLET | Refills: 0 | Status: SHIPPED | OUTPATIENT
Start: 2019-11-06 | End: 2019-11-20

## 2019-11-06 RX ORDER — IBUPROFEN 800 MG/1
800 TABLET, FILM COATED ORAL EVERY 8 HOURS PRN
COMMUNITY
End: 2022-04-07

## 2019-11-06 ASSESSMENT — ANXIETY QUESTIONNAIRES
5. BEING SO RESTLESS THAT IT IS HARD TO SIT STILL: SEVERAL DAYS
7. FEELING AFRAID AS IF SOMETHING AWFUL MIGHT HAPPEN: SEVERAL DAYS
7. FEELING AFRAID AS IF SOMETHING AWFUL MIGHT HAPPEN: SEVERAL DAYS
1. FEELING NERVOUS, ANXIOUS, OR ON EDGE: MORE THAN HALF THE DAYS
3. WORRYING TOO MUCH ABOUT DIFFERENT THINGS: MORE THAN HALF THE DAYS
4. TROUBLE RELAXING: MORE THAN HALF THE DAYS
GAD7 TOTAL SCORE: 11
GAD7 TOTAL SCORE: 11
6. BECOMING EASILY ANNOYED OR IRRITABLE: MORE THAN HALF THE DAYS
GAD7 TOTAL SCORE: 11
2. NOT BEING ABLE TO STOP OR CONTROL WORRYING: SEVERAL DAYS

## 2019-11-06 ASSESSMENT — PATIENT HEALTH QUESTIONNAIRE - PHQ9
SUM OF ALL RESPONSES TO PHQ QUESTIONS 1-9: 10
SUM OF ALL RESPONSES TO PHQ QUESTIONS 1-9: 10
10. IF YOU CHECKED OFF ANY PROBLEMS, HOW DIFFICULT HAVE THESE PROBLEMS MADE IT FOR YOU TO DO YOUR WORK, TAKE CARE OF THINGS AT HOME, OR GET ALONG WITH OTHER PEOPLE: VERY DIFFICULT

## 2019-11-06 ASSESSMENT — PAIN SCALES - GENERAL: PAINLEVEL: MILD PAIN (2)

## 2019-11-06 ASSESSMENT — MIFFLIN-ST. JEOR: SCORE: 1736.12

## 2019-11-06 NOTE — PATIENT INSTRUCTIONS
"    GENERAL ADVICE:  ~ Gradually ease back into your usual activities.   ~ Rest as needed to help your symptoms go away.  - Consider pairing 50 minutes of activity with 10 minutes of rest.  ~ Allow yourself more time for activities.  ~ Write things down.  At home, at work, whenever there is something that you should remember, even it is simple.  SCREENS:  ~ Change settings on your phone and computer using the \"Blue Light Filter\" (Night Shift on all  [employer] products)  ~ The goal is making screens more yellow and less blue.    ~ If this is not an option you can download this program, [employer] to adjust your screen resolution.  ~ [employer] for various filter and font apps  ~ Turn screen brightness down  ~ Increase font size  ~ Limit screen activities including computer, TV and phones.  NECK PAIN:  ~ Ice or Heat are good~  ~ Massage is ok if it doesn't trigger more symptoms~  ~ Gentle stretches and range-of-motion are helpful.  DIZZINESS:  ~ No driving when dizzy.  ~ No biking, climbing heights or ladders if dizzy.  FATIGUE:  ~ Daily exercise is strongly encouraged.  Start with a 10 min walk and increase the time as tolerated until you are walking 30 minutes per day.    ~ Focus on Good sleep hygiene instead of napping . Your goal should be 8 hours of sleep every night.  ~ Try Melatonin 1 hour before bed  ANXIETY OR MOOD SWINGS:  ~ If you are irritable or anxious, take a break in a quiet room.  ~ Try using the free Calm ever for guided breathing and mindfulness/meditation.  ~ Explore [employer] (https://www.headspace.com) for free and easy-to-use meditation guidance.  LIGHT SENSITIVITIES:  ~ Avoid florescent lighting when possible.  ~Yellow or michael tinted lenses may help reduce computer or night-time road glare.             ~ Amazon has a $10.00 option: [employer] yellow Night Vision.  NOISE SENSITIVITIES:  ~ Try listening to calming sounds such as the \"Calm Ever\" to help shift your focus off of more irritating " "sounds.  ~ Avoid crowded areas at first then slowly introduce yourself to small increments of crowded, noisy areas.  ~ Try High fidelity earplugs used by [employer]. Etymotic ETY-Plugs, can be found on Amazon for $13.00.  DIET:  - In principle incorporate more protein, lots of veggies, some fruit, whole grains.    - Less sweets and saturated fat.   - Mediterranean Diet is an easy-to-follow example.  ~ Drink plenty of water throughout the day (8-10 glasses per day)  SLEEP  ~ You need to sleep.   ~ If your headaches are keeping you awake take your ibuprofen and tylenol right before bedtime.  ~ Attempt for 8 hours of sleep a night. If you are having issues sleeping at night, avoid napping during the day.  ~ Melatonin starting at 3mg tab 30-60 minutes before bed may be helpful. This is a substance your body makes naturally that signals it is time for sleep.    \"Sleep hygiene\" means having good sleep habits. Follow the tips below to sleep better at night.      Get on a schedule. Go to bed and get up at about the same time every day.    Listen to your body. Only try to sleep when you actually feel tired or sleepy.    Be patient. If you haven't been able to get to sleep after about 20 minutes or more, get up and do something calming or boring until you feel sleepy. Then, return to bed and try again.      Avoid caffeine (coffee, tea, cola drinks, chocolate and some medicines) for at least 4 to 6 hours before going to bed. We also suggest you don't use alcohol or nicotine (cigarettes) during this time. Both can make it harder for you to fall asleep and stay asleep.    Stop drinking water 2-3 hours before bed to avoid awakening to use the bathroom    Use your bed for sleeping only. That means no TV, computer or homework in bed!    Avoid screens in general 1-2 hours before bedtime.    Don't nap during the day. If you do nap, make sure it is for less than an hour and before 3 p.m.    Create sleep rituals that remind your body " "that it is time to sleep. Examples include breathing exercises, stretching, or reading a book.     Try a bath or shower before bed. Having a hot bath 1 to 2 hours before bedtime can help you feel sleepy.    Don't watch the clock. Checking the clock during the night can wake you up. It can also lead to negative thoughts such as \"I will never fall asleep.\"    Use a sleep diary. Track your sleep schedule to know your sleep patterns and to see where you can improve.    Get regular exercise. But try not to do heavy exercise in the 4 hours before bedtime.      Eat a healthy, balanced diet. Try eating a light, healthy snack before bed, but avoid eating a heavy meal.    Create the right sleeping area. A cool, dark, quiet room is best. If needed, try earplugs, fans and blackout curtains.      Keep your daytime routine the same even if you have a bad night sleep. Avoiding activities the next day can make it harder to sleep.        For informational purposes only. Not to replace the advice of your health care provider. Copyright   2013 [facility]. All rights reserved.    PM&R / [facility]   Nurse Line # [phone]   Fax # [phone]  Scheduling; # [phone]  E-MAIL - [email or web address]    Thank you for allowing us to be a part of your care.         "
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
## 2019-11-06 NOTE — LETTER
November 12, 2019      Evan Gregg  [id number] [facility] [address]        Dear Evan,     We have made 3 attempts to contact you regarding your cervical spine MRI and right elbow x ray results.  Please contact the clinic for further information or schedule an appointment to further discuss.    Sincerely,          Ivett Lopez MD
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
## 2019-11-06 NOTE — TELEPHONE ENCOUNTER
Please call patient with MRI results:    Mri Cervical Spine W/o Contrast  Result Date: 10/23/2019  MRI CERVICAL SPINE WITHOUT CONTRAST 10/23/2019 3:26 PM HISTORY: Evaluate pain. Chronic right-sided thoracic back pain. Neck pain, chronic. Neck pain, chronic. Right elbow pain. TECHNIQUE: Multiplanar, multisequence MRI of the cervical spine without contrast. COMPARISON: X-rays 10/11/2019, CT 7/23/2019 FINDINGS: Alignment is maintained. Bone marrow is within normal limits. Cervical cord demonstrates slight anterior flattening as detailed below. No abnormal cord signal. Visualized intracranial cavity and paraspinal soft tissues are unremarkable. Level by level as follows: C2-C3:  No significant spinal canal or foraminal stenosis. C3-C4:  Mild disc height loss. Disc bulge, asymmetric to the left. Mild bilateral facet arthropathy. No significant right foraminal stenosis. Mild to moderate left foraminal stenosis. Mild spinal canal stenosis. C4-C5:  Mild disc height loss with superimposed small central disc protrusion. Mild bilateral facet arthropathy. Mild bilateral foraminal stenosis. Mild spinal canal stenosis with flattening of the anterior cervical cord. C5-C6:  Mild disc height loss. Disc osteophyte complex. Mild bilateral facet arthropathy. Mild bilateral foraminal stenosis. Mild spinal canal stenosis with slight flattening of the anterior cervical cord. C6-C7:  Moderate disc height loss. Disc osteophyte complex with superimposed small central disc protrusion. Mild bilateral facet arthropathy. Moderate to severe right foraminal stenosis. Moderate left foraminal stenosis. Mild spinal canal stenosis with slight indentation of the anterior cervical cord. C7-T1: Mild disc height loss. Moderate right facet arthropathy with suggestion of trace perifacet marrow edema. Mild left facet arthropathy. Mild right foraminal stenosis. No significant left foraminal stenosis. Mild spinal canal stenosis.   IMPRESSION:  1. Moderate  degenerative disc disease at C6-C7. 2. Moderate facet arthropathy on the right at C7-T1. WADE DONIS MD    RIGHT ELBOW THREE OR MORE VIEWS  10/11/2019 2:41 PM   HISTORY: Right elbow pain.                                                       IMPRESSION: Multiple small irregular ossicles are noted anterior to  the elbow, possibly representing articular bodies, unchanged from  1/31/2018. Hypertrophic spurring is suspected within the coronoid  fossa which could limit elbow flexion. Olecranon process spur is noted  at the triceps tendon attachment. The overall appearance of the elbow  is unchanged from 1/31/2018.    In Summary:  - Cervical Spine with degenerative changes and stenosis (narrowing) most prominent at C6-C7.  - Elbow x-ray with degenerative changes and possible loose bodies, similar to prior x-ray taken 1/2018    I Recommend:  - Options for further treatment including spine surgery referral or conservative treatment like injections and physical therapy  - My recommendations may differ based on patient's current exam and symptoms  - Can place surgical referral if desired or schedule appointment with me to review images, repeat exam and discuss my recommendations      Ivett Lopez MD, MD

## 2019-11-06 NOTE — TELEPHONE ENCOUNTER
Called and LVM for patient to return call with 2-3 times they would be available to discuss results.    Berkley Frazier, ATC

## 2019-11-07 ASSESSMENT — ANXIETY QUESTIONNAIRES: GAD7 TOTAL SCORE: 11

## 2019-11-07 ASSESSMENT — PATIENT HEALTH QUESTIONNAIRE - PHQ9: SUM OF ALL RESPONSES TO PHQ QUESTIONS 1-9: 10

## 2019-11-08 NOTE — TELEPHONE ENCOUNTER
LVM for patient to return call with 2-3 times they would be available to discuss results.    Rodger Lou, ATC

## 2019-11-12 ENCOUNTER — TELEPHONE (OUTPATIENT)
Dept: FAMILY MEDICINE | Facility: CLINIC | Age: 64
End: 2019-11-12

## 2019-11-12 NOTE — TELEPHONE ENCOUNTER
Reason for Call:  Other appointment    Detailed comments: Patient was told he needs an appointment to get his [employer] forms filled out.  Dr. Pineda is booked out, patient was stating he was told he needs to get in sooner than available.     Phone Number Patient can be reached at: Other phone number:  work - [phone]    Best Time: Any    Can we leave a detailed message on this number? YES    Call taken on 11/12/2019 at 8:27 AM by Natalee Hu

## 2019-11-12 NOTE — TELEPHONE ENCOUNTER
3 attempts have been made to contact patient. A letter was sent to [facility] and address in chart requesting a return call or clinic follow visit to discuss results of the MRI. No appointment is currently scheduled for follow up.  Rodger Lou ATC

## 2019-11-13 ENCOUNTER — THERAPY VISIT (OUTPATIENT)
Dept: PHYSICAL THERAPY | Facility: CLINIC | Age: 64
End: 2019-11-13
Payer: COMMERCIAL

## 2019-11-13 DIAGNOSIS — M25.551 HIP PAIN, BILATERAL: ICD-10-CM

## 2019-11-13 DIAGNOSIS — M25.552 HIP PAIN, BILATERAL: ICD-10-CM

## 2019-11-13 PROCEDURE — 97112 NEUROMUSCULAR REEDUCATION: CPT | Mod: GP | Performed by: PHYSICAL THERAPIST

## 2019-11-13 PROCEDURE — 97140 MANUAL THERAPY 1/> REGIONS: CPT | Mod: GP | Performed by: PHYSICAL THERAPIST

## 2019-11-13 PROCEDURE — 97110 THERAPEUTIC EXERCISES: CPT | Mod: GP | Performed by: PHYSICAL THERAPIST

## 2019-11-15 ENCOUNTER — OFFICE VISIT (OUTPATIENT)
Dept: ORTHOPEDICS | Facility: CLINIC | Age: 64
End: 2019-11-15
Payer: COMMERCIAL

## 2019-11-15 VITALS
BODY MASS INDEX: 29.35 KG/M2 | HEIGHT: 70 IN | DIASTOLIC BLOOD PRESSURE: 70 MMHG | SYSTOLIC BLOOD PRESSURE: 128 MMHG | WEIGHT: 205 LBS

## 2019-11-15 DIAGNOSIS — M50.30 DEGENERATION OF CERVICAL INTERVERTEBRAL DISC: ICD-10-CM

## 2019-11-15 DIAGNOSIS — G89.29 CHRONIC RIGHT-SIDED THORACIC BACK PAIN: Primary | ICD-10-CM

## 2019-11-15 DIAGNOSIS — M54.2 NECK PAIN, CHRONIC: ICD-10-CM

## 2019-11-15 DIAGNOSIS — G89.29 NECK PAIN, CHRONIC: ICD-10-CM

## 2019-11-15 DIAGNOSIS — M54.6 CHRONIC RIGHT-SIDED THORACIC BACK PAIN: Primary | ICD-10-CM

## 2019-11-15 DIAGNOSIS — M19.021 ARTHRITIS OF RIGHT ELBOW: ICD-10-CM

## 2019-11-15 PROCEDURE — 99213 OFFICE O/P EST LOW 20 MIN: CPT | Performed by: PEDIATRICS

## 2019-11-15 ASSESSMENT — MIFFLIN-ST. JEOR: SCORE: 1736.12

## 2019-11-15 NOTE — PATIENT INSTRUCTIONS
Plan:  - Today's Plan of Care:  Rehab: Physical Therapy: [facility] - [phone]    -We also discussed other future treatment options:  Referral to Spine, US guided elbow corticosteroid injection    Follow Up: 6 - 8 weeks and as needed    If you have any further questions for your physician or physician s care team you can call [phone] and use option 3 to leave a voice message. Calls received during business hours will be returned same day.

## 2019-11-15 NOTE — PROGRESS NOTES
Sports Medicine Clinic Visit - Interim History November 15, 2019    PCP: Brennan Pineda CHRISTAL Gregg is a 64 year old male who is seen in f/u up for    Chronic right-sided thoracic back pain  Neck pain, chronic  Arthritis of right elbow  Degeneration of cervical intervertebral disc. Since last visit on 10/11/19 patient has completed an MRI of his cervical spine. He continues to report neck and eight elbow pain. He reports his cervical pain increases with rotation.  Most pain is in right side of thoracic spine.  No radiating neck pain.  Elbow symptoms are improved.    Symptoms are better with: rest  Symptoms are worse with: Lifting and cervical rotation  Additional Features:   Positive: weakness   Negative: swelling, bruising, popping, grinding, catching, locking, instability, paresthesias and numbness    Social History: Computer and electronic support for the [employer]    Review of Systems  Skin: no bruising, no swelling  Musculoskeletal: as above  Neurologic: no numbness, paresthesias  Remainder of review of systems is negative including constitutional, CV, pulmonary, GI, except as noted in HPI or medical history.    Patient's current problem list, past medical and surgical history, and family history were reviewed.    Patient Active Problem List   Diagnosis     Rosacea     CARDIOVASCULAR SCREENING; LDL GOAL LESS THAN 160     Plantar wart of left foot     BCC (basal cell carcinoma), arm     Osteoarthritis of hip     Hip pain, bilateral     Shoulder pain, right     Right elbow pain     Past Medical History:   Diagnosis Date     Anxiety 7/23/2019     Arthritis 3/25/2014     Depressive disorder 7/23/2019     Hearing problem 7/23/2019     Reduced vision 7/23/2019     Past Surgical History:   Procedure Laterality Date     COLONOSCOPY  11/11/2013    Procedure: COLONOSCOPY;  Colonoscopy;  Surgeon: Montana Corona MD;  Location: [facility]     HIP SURGERY  June, 2016    Resurfacing of right hip.   "    ORTHOPEDIC SURGERY  Summer 2016    Bilateral hip resurfacing     Family History   Problem Relation Age of Onset     Thyroid Disease Mother      Heart Disease Father 56        heart attack     Cancer Father         skin cancer     Lipids Father      Hypertension Father      Cancer - colorectal Maternal Grandfather      Thyroid Disease Brother      Cancer Brother         skin ca     Cancer Maternal Uncle 60        Lung     Glaucoma No family hx of      Macular Degeneration No family hx of        Objective  /70   Ht 1.778 m (5' 10\")   Wt 93 kg (205 lb)   BMI 29.41 kg/m      GENERAL APPEARANCE: healthy, alert and no distress   GAIT: NORMAL  SKIN: no suspicious lesions or rashes  HEENT: Sclera clear, anicteric  CV: good peripheral pulses  RESP: Breathing not labored  NEURO: Normal strength and tone, mentation intact and speech normal  PSYCH:  mentation appears normal and affect normal/bright     Cervical Spine Exam  Inspection:       No visible deformity        normal lordotic curvature maintained     Posture:      rounded shoulders and upper back     Tender:      paraspinal muscles       upper border of trapezius     Non-Tender:      remainder of cervical spine area     Range of Motion:       Slightly limited motion in extension, lateral flexion and rotation     Painful Motions:       lateral rotation        lateral flexion     Strength:     C4 (shoulder shrug)  symmetric 5/5       C5 (shoulder abduction) symmetric 5/5       C6 (elbow flexion) symmetric 5/5       C7 (elbow extension) symmetric 5/5       C8 (finger abduction, thumb flexion) symmetric 5/5     Reflexes:      C5 (biceps) symmetric normal       C6 (supinator) symmetric normal       C7 (triceps) symmetric normal     Sensation:     grossly intact througout bilateral upper extremities     Special Tests:      neg (-) Spurling     Skin:     well perfused       capillary refill brisk     Lymphatics:      no edema noted in the upper extremities "      Bilateral Elbow exam:     Inspection:     no ecchymosis       no edema or effusion     Tender:     olecranon right     Non-Tender:      remainder of the elbow bilaterally     ROM:      Slightly limited full flexion, extension, pronation and supination     Strength:     flexion 4/5 right       extension 4/5 right       forearm supination 4/5 right       forearm pronation 4/5 right     Sensation:     intact throughout hand       intact throughout forearm    Radiology  I ordered, visualized and reviewed these images with the patient  MRI CERVICAL SPINE WITHOUT CONTRAST 10/23/2019 3:26 PM      HISTORY: Evaluate pain. Chronic right-sided thoracic back pain. Neck  pain, chronic. Neck pain, chronic. Right elbow pain.     TECHNIQUE: Multiplanar, multisequence MRI of the cervical spine  without contrast.      COMPARISON: X-rays 10/11/2019, CT 7/23/2019     FINDINGS: Alignment is maintained. Bone marrow is within normal  limits. Cervical cord demonstrates slight anterior flattening as  detailed below. No abnormal cord signal. Visualized intracranial  cavity and paraspinal soft tissues are unremarkable.     Level by level as follows:     C2-C3:  No significant spinal canal or foraminal stenosis.      C3-C4:  Mild disc height loss. Disc bulge, asymmetric to the left.  Mild bilateral facet arthropathy. No significant right foraminal  stenosis. Mild to moderate left foraminal stenosis. Mild spinal canal  stenosis.      C4-C5:  Mild disc height loss with superimposed small central disc  protrusion. Mild bilateral facet arthropathy. Mild bilateral foraminal  stenosis. Mild spinal canal stenosis with flattening of the anterior  cervical cord.      C5-C6:  Mild disc height loss. Disc osteophyte complex. Mild bilateral  facet arthropathy. Mild bilateral foraminal stenosis. Mild spinal  canal stenosis with slight flattening of the anterior cervical cord.      C6-C7:  Moderate disc height loss. Disc osteophyte complex  with  superimposed small central disc protrusion. Mild bilateral facet  arthropathy. Moderate to severe right foraminal stenosis. Moderate  left foraminal stenosis. Mild spinal canal stenosis with slight  indentation of the anterior cervical cord.     C7-T1: Mild disc height loss. Moderate right facet arthropathy with  suggestion of trace perifacet marrow edema. Mild left facet  arthropathy. Mild right foraminal stenosis. No significant left  foraminal stenosis. Mild spinal canal stenosis.                                                                      IMPRESSION:    1. Moderate degenerative disc disease at C6-C7.  2. Moderate facet arthropathy on the right at C7-T1.       Assessment:  1. Chronic right-sided thoracic back pain    2. Neck pain, chronic    3. Arthritis of right elbow    4. Degeneration of cervical intervertebral disc      1) Cervical degenerative disc diseases and facet arthropathy.  We discussed the following treatment options: symptom treatment, activity modification/rest, imaging, rehab and referral. Following discussion, plan: will start with physical therapy, can consider referral or injections pending clinical course.    2) Right elbow with degenerative changes.  Discussed injection, hand therapy or referral.    Plan:  - Today's Plan of Care:  Rehab: Physical Therapy: [facility] - [phone]    -We also discussed other future treatment options:  Referral to Spine, US guided elbow corticosteroid injection    Follow Up: 6 - 8 weeks and as needed    Concerning signs and symptoms were reviewed.  The patient expressed understanding of this management plan and all questions were answered at this time.    Ivett Lopez MD CAQ  Primary Care Sports Medicine  [facility]

## 2019-11-15 NOTE — LETTER
11/15/2019         RE: Evan Gregg  [address] [facility] [address]        Dear Colleague,    Thank you for referring your patient, Evan Gregg, to the [facility]. Please see a copy of my visit note below.    Sports Medicine Clinic Visit - Interim History November 15, 2019    PCP: Brennan Pineda    Evan Gregg is a 64 year old male who is seen in f/u up for    Chronic right-sided thoracic back pain  Neck pain, chronic  Arthritis of right elbow  Degeneration of cervical intervertebral disc. Since last visit on 10/11/19 patient has completed an MRI of his cervical spine. He continues to report neck and eight elbow pain. He reports his cervical pain increases with rotation.  Most pain is in right side of thoracic spine.  No radiating neck pain.  Elbow symptoms are improved.    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    Patient's current problem list, past medical and surgical history, and family history were reviewed.    Patient Active Problem List   Diagnosis     Rosacea     CARDIOVASCULAR SCREENING; LDL GOAL LESS THAN 160     Plantar wart of left foot     BCC (basal cell carcinoma), arm     Osteoarthritis of hip     Hip pain, bilateral     Shoulder pain, right     Right elbow pain     Past Medical History:   Diagnosis Date     Anxiety 7/23/2019     Arthritis 3/25/2014     Depressive disorder 7/23/2019     Hearing problem  "7/23/2019     Reduced vision 7/23/2019     Past Surgical History:   Procedure Laterality Date     COLONOSCOPY  11/11/2013    Procedure: COLONOSCOPY;  Colonoscopy;  Surgeon: Montana Corona MD;  Location: [facility]     HIP SURGERY  June, 2016    Resurfacing of right hip.      ORTHOPEDIC SURGERY  Summer 2016    Bilateral hip resurfacing     Family History   Problem Relation Age of Onset     Thyroid Disease Mother      Heart Disease Father 56        heart attack     Cancer Father         skin cancer     Lipids Father      Hypertension Father      Cancer - colorectal Maternal Grandfather      Thyroid Disease Brother      Cancer Brother         skin ca     Cancer Maternal Uncle 60        Lung     Glaucoma No family hx of      Macular Degeneration No family hx of        Objective  /70   Ht 1.778 m (5' 10\")   Wt 93 kg (205 lb)   BMI 29.41 kg/m       GENERAL APPEARANCE: healthy, alert and no distress   GAIT: NORMAL  SKIN: no suspicious lesions or rashes  HEENT: Sclera clear, anicteric  CV: good peripheral pulses  RESP: Breathing not labored  NEURO: Normal strength and tone, mentation intact and speech normal  PSYCH:  mentation appears normal and affect normal/bright     Cervical Spine Exam  Inspection:       No visible deformity        normal lordotic curvature maintained     Posture:      rounded shoulders and upper back     Tender:      paraspinal muscles       upper border of trapezius     Non-Tender:      remainder of cervical spine area     Range of Motion:       Slightly limited motion in extension, lateral flexion and rotation     Painful Motions:       lateral rotation        lateral flexion     Strength:     C4 (shoulder shrug)  symmetric 5/5       C5 (shoulder abduction) symmetric 5/5       C6 (elbow flexion) symmetric 5/5       C7 (elbow extension) symmetric 5/5       C8 (finger abduction, thumb flexion) symmetric 5/5     Reflexes:      C5 (biceps) symmetric normal       C6 (supinator) symmetric " normal       C7 (triceps) symmetric normal     Sensation:     grossly intact througout bilateral upper extremities     Special Tests:      neg (-) Spurling     Skin:     well perfused       capillary refill brisk     Lymphatics:      no edema noted in the upper extremities      Bilateral Elbow exam:     Inspection:     no ecchymosis       no edema or effusion     Tender:     olecranon right     Non-Tender:      remainder of the elbow bilaterally     ROM:      Slightly limited full flexion, extension, pronation and supination     Strength:     flexion 4/5 right       extension 4/5 right       forearm supination 4/5 right       forearm pronation 4/5 right     Sensation:     intact throughout hand       intact throughout forearm    Radiology  I ordered, visualized and reviewed these images with the patient  MRI CERVICAL SPINE WITHOUT CONTRAST 10/23/2019 3:26 PM      HISTORY: Evaluate pain. Chronic right-sided thoracic back pain. Neck  pain, chronic. Neck pain, chronic. Right elbow pain.     TECHNIQUE: Multiplanar, multisequence MRI of the cervical spine  without contrast.      COMPARISON: X-rays 10/11/2019, CT 7/23/2019     FINDINGS: Alignment is maintained. Bone marrow is within normal  limits. Cervical cord demonstrates slight anterior flattening as  detailed below. No abnormal cord signal. Visualized intracranial  cavity and paraspinal soft tissues are unremarkable.     Level by level as follows:     C2-C3:  No significant spinal canal or foraminal stenosis.      C3-C4:  Mild disc height loss. Disc bulge, asymmetric to the left.  Mild bilateral facet arthropathy. No significant right foraminal  stenosis. Mild to moderate left foraminal stenosis. Mild spinal canal  stenosis.      C4-C5:  Mild disc height loss with superimposed small central disc  protrusion. Mild bilateral facet arthropathy. Mild bilateral foraminal  stenosis. Mild spinal canal stenosis with flattening of the anterior  cervical cord.      C5-C6:   Mild disc height loss. Disc osteophyte complex. Mild bilateral  facet arthropathy. Mild bilateral foraminal stenosis. Mild spinal  canal stenosis with slight flattening of the anterior cervical cord.      C6-C7:  Moderate disc height loss. Disc osteophyte complex with  superimposed small central disc protrusion. Mild bilateral facet  arthropathy. Moderate to severe right foraminal stenosis. Moderate  left foraminal stenosis. Mild spinal canal stenosis with slight  indentation of the anterior cervical cord.     C7-T1: Mild disc height loss. Moderate right facet arthropathy with  suggestion of trace perifacet marrow edema. Mild left facet  arthropathy. Mild right foraminal stenosis. No significant left  foraminal stenosis. Mild spinal canal stenosis.                                                                      IMPRESSION:    1. Moderate degenerative disc disease at C6-C7.  2. Moderate facet arthropathy on the right at C7-T1.       Assessment:  1. Chronic right-sided thoracic back pain    2. Neck pain, chronic    3. Arthritis of right elbow    4. Degeneration of cervical intervertebral disc      1) Cervical degenerative disc diseases and facet arthropathy.  We discussed the following treatment options: symptom treatment, activity modification/rest, imaging, rehab and referral. Following discussion, plan: will start with physical therapy, can consider referral or injections pending clinical course.    2) Right elbow with degenerative changes.  Discussed injection, hand therapy or referral.    Plan:  - Today's Plan of Care:  Rehab: [facility] [phone]    -We also discussed other future treatment options:  Referral to Spine, US guided elbow corticosteroid injection    Follow Up: 6 - 8 weeks and as needed    Concerning signs and symptoms were reviewed.  The patient expressed understanding of this management plan and all questions were answered at this time.    Ivett Lopez MD  [place]  Primary Care Sports Medicine  [facility]    Again, thank you for allowing me to participate in the care of your patient.        Sincerely,        Ivett Lopez MD

## 2019-12-02 ENCOUNTER — OFFICE VISIT (OUTPATIENT)
Dept: FAMILY MEDICINE | Facility: CLINIC | Age: 64
End: 2019-12-02
Payer: COMMERCIAL

## 2019-12-02 VITALS
HEART RATE: 54 BPM | RESPIRATION RATE: 18 BRPM | TEMPERATURE: 98.7 F | WEIGHT: 212 LBS | BODY MASS INDEX: 30.35 KG/M2 | DIASTOLIC BLOOD PRESSURE: 70 MMHG | HEIGHT: 70 IN | SYSTOLIC BLOOD PRESSURE: 112 MMHG
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
## 2019-12-02 PROCEDURE — 99214 OFFICE O/P EST MOD 30 MIN: CPT | Performed by: FAMILY MEDICINE

## 2019-12-02 ASSESSMENT — MIFFLIN-ST. JEOR: SCORE: 1767.88

## 2019-12-02 NOTE — NURSING NOTE
"Chief Complaint   Patient presents with     Forms     Patient Request     Would like to discuss having a blood glucose and FLP did have one in July but not fasting.       Initial /70   Pulse 54   Temp 98.7  F (37.1  C) (Tympanic)   Resp 18   Ht 1.778 m (5' 10\")   Wt 96.2 kg (212 lb)   BMI 30.42 kg/m   Estimated body mass index is 30.42 kg/m  as calculated from the following:    Height as of this encounter: 1.778 m (5' 10\").    Weight as of this encounter: 96.2 kg (212 lb).    Patient presents to the clinic using     Health Maintenance that is potentially due pending provider review:          Is there anyone who you would like to be able to receive your results?   If yes have patient fill out FADUMO    "
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
## 2019-12-10 ENCOUNTER — THERAPY VISIT (OUTPATIENT)
Dept: PHYSICAL THERAPY | Facility: CLINIC | Age: 64
End: 2019-12-10
Payer: COMMERCIAL

## 2019-12-10 DIAGNOSIS — M25.552 HIP PAIN, BILATERAL: ICD-10-CM

## 2019-12-10 DIAGNOSIS — M25.551 HIP PAIN, BILATERAL: ICD-10-CM

## 2019-12-10 PROCEDURE — 97110 THERAPEUTIC EXERCISES: CPT | Mod: GP | Performed by: PHYSICAL THERAPIST

## 2019-12-10 PROCEDURE — 97112 NEUROMUSCULAR REEDUCATION: CPT | Mod: GP | Performed by: PHYSICAL THERAPIST

## 2019-12-10 PROCEDURE — 97530 THERAPEUTIC ACTIVITIES: CPT | Mod: GP | Performed by: PHYSICAL THERAPIST

## 2020-01-13 ENCOUNTER — HOSPITAL ENCOUNTER (OUTPATIENT)
Dept: OCCUPATIONAL THERAPY | Facility: CLINIC | Age: 65
Setting detail: THERAPIES SERIES
End: 2020-01-13
Attending: PHYSICAL MEDICINE & REHABILITATION
Payer: COMMERCIAL

## 2020-01-13 PROCEDURE — 97165 OT EVAL LOW COMPLEX 30 MIN: CPT | Mod: GO | Performed by: OCCUPATIONAL THERAPIST

## 2020-01-13 PROCEDURE — 97535 SELF CARE MNGMENT TRAINING: CPT | Mod: GO | Performed by: OCCUPATIONAL THERAPIST
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
## 2020-02-07 ENCOUNTER — OFFICE VISIT (OUTPATIENT)
Dept: PHYSICAL MEDICINE AND REHAB | Facility: CLINIC | Age: 65
End: 2020-02-07
Payer: COMMERCIAL

## 2020-02-07 VITALS
WEIGHT: 210 LBS | BODY MASS INDEX: 30.06 KG/M2 | HEIGHT: 70 IN | SYSTOLIC BLOOD PRESSURE: 129 MMHG | OXYGEN SATURATION: 99 % | HEART RATE: 58 BPM | DIASTOLIC BLOOD PRESSURE: 79 MMHG

## 2020-02-07 DIAGNOSIS — F07.81 POST CONCUSSION SYNDROME: ICD-10-CM

## 2020-02-07 DIAGNOSIS — G47.9 SLEEP DISTURBANCE: ICD-10-CM

## 2020-02-07 DIAGNOSIS — S06.0X0D CONCUSSION WITHOUT LOSS OF CONSCIOUSNESS, SUBSEQUENT ENCOUNTER: Primary | ICD-10-CM

## 2020-02-07 DIAGNOSIS — G44.329 CHRONIC POST-TRAUMATIC HEADACHE, NOT INTRACTABLE: ICD-10-CM

## 2020-02-07 DIAGNOSIS — F43.10 PTSD (POST-TRAUMATIC STRESS DISORDER): ICD-10-CM
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
## 2020-02-07 RX ORDER — TRAZODONE HYDROCHLORIDE 50 MG/1
50 TABLET, FILM COATED ORAL
Qty: 30 TABLET | Refills: 3 | Status: SHIPPED | OUTPATIENT
Start: 2020-02-07 | End: 2022-04-07

## 2020-02-07 RX ORDER — LANOLIN ALCOHOL/MO/W.PET/CERES
3 CREAM (GRAM) TOPICAL
Qty: 30 TABLET | Refills: 3 | Status: SHIPPED | OUTPATIENT
Start: 2020-02-07 | End: 2020-03-11

## 2020-02-07 ASSESSMENT — ANXIETY QUESTIONNAIRES
5. BEING SO RESTLESS THAT IT IS HARD TO SIT STILL: SEVERAL DAYS
4. TROUBLE RELAXING: SEVERAL DAYS
1. FEELING NERVOUS, ANXIOUS, OR ON EDGE: SEVERAL DAYS
6. BECOMING EASILY ANNOYED OR IRRITABLE: SEVERAL DAYS
7. FEELING AFRAID AS IF SOMETHING AWFUL MIGHT HAPPEN: NOT AT ALL
GAD7 TOTAL SCORE: 6
2. NOT BEING ABLE TO STOP OR CONTROL WORRYING: SEVERAL DAYS
3. WORRYING TOO MUCH ABOUT DIFFERENT THINGS: SEVERAL DAYS
GAD7 TOTAL SCORE: 6
7. FEELING AFRAID AS IF SOMETHING AWFUL MIGHT HAPPEN: NOT AT ALL
GAD7 TOTAL SCORE: 6

## 2020-02-07 ASSESSMENT — PATIENT HEALTH QUESTIONNAIRE - PHQ9
SUM OF ALL RESPONSES TO PHQ QUESTIONS 1-9: 6
10. IF YOU CHECKED OFF ANY PROBLEMS, HOW DIFFICULT HAVE THESE PROBLEMS MADE IT FOR YOU TO DO YOUR WORK, TAKE CARE OF THINGS AT HOME, OR GET ALONG WITH OTHER PEOPLE: SOMEWHAT DIFFICULT
SUM OF ALL RESPONSES TO PHQ QUESTIONS 1-9: 6

## 2020-02-07 ASSESSMENT — MIFFLIN-ST. JEOR: SCORE: 1758.8
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
## 2020-02-08 ASSESSMENT — ANXIETY QUESTIONNAIRES: GAD7 TOTAL SCORE: 6

## 2020-02-08 ASSESSMENT — PATIENT HEALTH QUESTIONNAIRE - PHQ9: SUM OF ALL RESPONSES TO PHQ QUESTIONS 1-9: 6

## 2020-02-28 ENCOUNTER — TELEPHONE (OUTPATIENT)
Dept: RHEUMATOLOGY | Facility: CLINIC | Age: 65
End: 2020-02-28

## 2020-02-28 ENCOUNTER — HOSPITAL ENCOUNTER (OUTPATIENT)
Dept: CT IMAGING | Facility: CLINIC | Age: 65
Discharge: HOME | End: 2020-02-28
Attending: INTERNAL MEDICINE | Admitting: INTERNAL MEDICINE
Payer: COMMERCIAL

## 2020-02-28 VITALS — OXYGEN SATURATION: 97 % | HEART RATE: 59 BPM | SYSTOLIC BLOOD PRESSURE: 113 MMHG | DIASTOLIC BLOOD PRESSURE: 83 MMHG

## 2020-02-28 DIAGNOSIS — I25.119 CORONARY ARTERY DISEASE INVOLVING NATIVE CORONARY ARTERY OF NATIVE HEART WITH ANGINA PECTORIS (H): ICD-10-CM

## 2020-02-28 DIAGNOSIS — R07.9 CHEST PAIN, UNSPECIFIED TYPE: ICD-10-CM

## 2020-02-28 LAB — RADIOLOGIST FLAGS: NORMAL

## 2020-02-28 PROCEDURE — 25000128 H RX IP 250 OP 636: Performed by: INTERNAL MEDICINE

## 2020-02-28 PROCEDURE — 25000132 ZZH RX MED GY IP 250 OP 250 PS 637: Performed by: INTERNAL MEDICINE

## 2020-02-28 PROCEDURE — 75574 CT ANGIO HRT W/3D IMAGE: CPT | Mod: 26 | Performed by: INTERNAL MEDICINE

## 2020-02-28 PROCEDURE — 75574 CT ANGIO HRT W/3D IMAGE: CPT

## 2020-02-28 PROCEDURE — 0504T CT FFR: CPT | Performed by: INTERNAL MEDICINE

## 2020-02-28 PROCEDURE — 0503T CT FFR: CPT

## 2020-02-28 RX ORDER — METOPROLOL TARTRATE 50 MG
50-100 TABLET ORAL
Status: COMPLETED | OUTPATIENT
Start: 2020-02-28 | End: 2020-02-28

## 2020-02-28 RX ORDER — NITROGLYCERIN 0.4 MG/1
.4-.8 TABLET SUBLINGUAL
Status: DISCONTINUED | OUTPATIENT
Start: 2020-02-28 | End: 2020-02-29 | Stop reason: HOSPADM

## 2020-02-28 RX ORDER — ONDANSETRON 2 MG/ML
4 INJECTION INTRAMUSCULAR; INTRAVENOUS
Status: DISCONTINUED | OUTPATIENT
Start: 2020-02-28 | End: 2020-02-29 | Stop reason: HOSPADM

## 2020-02-28 RX ORDER — METHYLPREDNISOLONE SODIUM SUCCINATE 125 MG/2ML
125 INJECTION, POWDER, LYOPHILIZED, FOR SOLUTION INTRAMUSCULAR; INTRAVENOUS
Status: DISCONTINUED | OUTPATIENT
Start: 2020-02-28 | End: 2020-02-29 | Stop reason: HOSPADM

## 2020-02-28 RX ORDER — IOPAMIDOL 755 MG/ML
120 INJECTION, SOLUTION INTRAVASCULAR ONCE
Status: COMPLETED | OUTPATIENT
Start: 2020-02-28 | End: 2020-02-28

## 2020-02-28 RX ORDER — METOPROLOL TARTRATE 1 MG/ML
5-15 INJECTION, SOLUTION INTRAVENOUS
Status: DISCONTINUED | OUTPATIENT
Start: 2020-02-28 | End: 2020-02-29 | Stop reason: HOSPADM

## 2020-02-28 RX ORDER — DIPHENHYDRAMINE HYDROCHLORIDE 50 MG/ML
25-50 INJECTION INTRAMUSCULAR; INTRAVENOUS
Status: DISCONTINUED | OUTPATIENT
Start: 2020-02-28 | End: 2020-02-29 | Stop reason: HOSPADM

## 2020-02-28 RX ORDER — ACYCLOVIR 200 MG/1
0-1 CAPSULE ORAL
Status: DISCONTINUED | OUTPATIENT
Start: 2020-02-28 | End: 2020-02-29 | Stop reason: HOSPADM

## 2020-02-28 RX ORDER — DIPHENHYDRAMINE HCL 25 MG
25 CAPSULE ORAL
Status: DISCONTINUED | OUTPATIENT
Start: 2020-02-28 | End: 2020-02-29 | Stop reason: HOSPADM

## 2020-02-28 RX ADMIN — METOPROLOL TARTRATE 25 MG: 25 TABLET ORAL at 08:27

## 2020-02-28 RX ADMIN — IOPAMIDOL 120 ML: 755 INJECTION, SOLUTION INTRAVENOUS at 09:01

## 2020-02-28 RX ADMIN — NITROGLYCERIN 0.8 MG: 0.4 TABLET SUBLINGUAL at 09:05

## 2020-02-28 NOTE — PROGRESS NOTES
Pt arrived for Coronary CT angiogram. Test, meds and side effects reviewed with pt.  Resting HR  55-59 bpm. Given 25 mg PO Metoprolol per verbal order. Administered 0.8 mg SL nitro on CTA table per order. CTA completed.  Patient tolerated procedure well and denies symptoms of allergic reaction.  Post monitoring completed and VSS.  D/C instructions reviewed with pt whom verbalized understanding of need to increase PO fluids today. D/C to gold waiting room accompanied by staff.

## 2020-02-28 NOTE — TELEPHONE ENCOUNTER
Received incidental finding information. CT today showed lung nodule. Radiologist recommends 12 mo follow up CT.     MYRON SchultzN, RN   Rheumatology Care Coordinator   [facility]

## 2020-03-06 ASSESSMENT — ENCOUNTER SYMPTOMS
POLYDIPSIA: 0
WEIGHT GAIN: 0
DISTURBANCES IN COORDINATION: 0
NERVOUS/ANXIOUS: 1
DECREASED CONCENTRATION: 1
SEIZURES: 0
SPEECH CHANGE: 0
ARTHRALGIAS: 0
ALTERED TEMPERATURE REGULATION: 0
DECREASED APPETITE: 0
PARALYSIS: 0
POLYPHAGIA: 0
HEADACHES: 1
FATIGUE: 0
PANIC: 0
MUSCLE WEAKNESS: 0
NUMBNESS: 0
MUSCLE CRAMPS: 0
JOINT SWELLING: 0
BACK PAIN: 1
LOSS OF CONSCIOUSNESS: 0
NECK PAIN: 1
INSOMNIA: 1
TREMORS: 0
DIZZINESS: 0
DEPRESSION: 1
MYALGIAS: 0
WEIGHT LOSS: 0
CHILLS: 0
NIGHT SWEATS: 0
FEVER: 0
INCREASED ENERGY: 1
HALLUCINATIONS: 0
TINGLING: 0
STIFFNESS: 1
MEMORY LOSS: 1
WEAKNESS: 0

## 2020-03-09 DIAGNOSIS — Z13.6 CARDIOVASCULAR SCREENING; LDL GOAL LESS THAN 160: ICD-10-CM

## 2020-03-09 LAB
CHOLEST SERPL-MCNC: 173 MG/DL
HDLC SERPL-MCNC: 46 MG/DL
LDLC SERPL CALC-MCNC: 103 MG/DL
NONHDLC SERPL-MCNC: 126 MG/DL
TRIGL SERPL-MCNC: 115 MG/DL

## 2020-03-11 ENCOUNTER — OFFICE VISIT (OUTPATIENT)
Dept: CARDIOLOGY | Facility: CLINIC | Age: 65
End: 2020-03-11
Attending: INTERNAL MEDICINE
Payer: COMMERCIAL

## 2020-03-11 VITALS
OXYGEN SATURATION: 98 % | WEIGHT: 207.4 LBS | BODY MASS INDEX: 29.69 KG/M2 | HEIGHT: 70 IN | DIASTOLIC BLOOD PRESSURE: 82 MMHG | HEART RATE: 54 BPM | SYSTOLIC BLOOD PRESSURE: 134 MMHG

## 2020-03-11 DIAGNOSIS — I25.10 CORONARY ARTERY DISEASE INVOLVING NATIVE CORONARY ARTERY OF NATIVE HEART WITHOUT ANGINA PECTORIS: Primary | ICD-10-CM

## 2020-03-11 PROCEDURE — G0463 HOSPITAL OUTPT CLINIC VISIT: HCPCS | Mod: 25,ZF

## 2020-03-11 PROCEDURE — 99214 OFFICE O/P EST MOD 30 MIN: CPT | Mod: ZP | Performed by: INTERNAL MEDICINE

## 2020-03-11 RX ORDER — ROSUVASTATIN CALCIUM 20 MG/1
20 TABLET, COATED ORAL DAILY
Qty: 90 TABLET | Refills: 3 | Status: SHIPPED | OUTPATIENT
Start: 2020-03-11 | End: 2021-03-17

## 2020-03-11 ASSESSMENT — MIFFLIN-ST. JEOR: SCORE: 1747.01

## 2020-03-11 ASSESSMENT — PAIN SCALES - GENERAL: PAINLEVEL: NO PAIN (0)

## 2020-03-11 NOTE — NURSING NOTE
Labs: Patient was given results of the laboratory testing obtained today. Patient was instructed to return for the next laboratory testing in 3 months and again in 6 months. Patient demonstrated understanding of this information and agreed to call with further questions or concerns.   Med Reconcile: Reviewed and verified all current medications with the patient. The updated medication list was printed and given to the patient.  New Medication: Patient was educated regarding newly prescribed medication, including discussion of  the indication, administration, side effects, and when to report to MD or RN. Patient demonstrated understanding of this information and agreed to call with further questions or concerns.  Return Appointment: Patient given instructions regarding scheduling next clinic visit. Patient demonstrated understanding of this information and agreed to call with further questions or concerns.  Patient stated he understood all health information given and agreed to call with further questions or concerns.    Venice Garcia LPN
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
## 2020-03-11 NOTE — NURSING NOTE
Chief Complaint   Patient presents with     Follow Up     Rtn Visit     Vitals were taken and medications were reconciled.     Sirsiha Bhat RMA  9:19 AM

## 2020-03-11 NOTE — PATIENT INSTRUCTIONS
Patient Instructions:  It was a pleasure to see you in the cardiology clinic today.      If you have any questions, you can reach my nurse, Venice CHEEMA LPN, at [phone].  Press Option #1 for the [facility], and then press Option #4 for nursing.    We are encouraging the use of MyChart to communicate with your HealthCare Provider    Medication Changes:   - Start Aspirin 81 mg every day.  - Start Rosuvastatin (Crestor) 20 mg every day.    Recommendations: Recheck fasting labs in 3 months.    Studies Ordered: None.    The results from today include: None.    Please follow up: With Dr. Kamara in 6 months.    Sincerely,    Dante Kamara MD     If you have an urgent need after hours (8:00 am to 4:30 pm) please call [phone] and ask for the cardiology fellow on call.        Patient Education     Rosuvastatin Calcium Oral tablet  What is this medicine?  ROSUVASTATIN (daron TOBY va sta tin) is known as a HMG-CoA reductase inhibitor or 'statin'. It lowers cholesterol and triglycerides in the blood. This drug may also reduce the risk of heart attack, stroke, or other health problems in patients with risk factors for heart disease. Diet and lifestyle changes are often used with this drug.  This medicine may be used for other purposes; ask your health care provider or pharmacist if you have questions.  What should I tell my health care provider before I take this medicine?  They need to know if you have any of these conditions:    frequently drink alcoholic beverages    kidney disease    liver disease    muscle aches or weakness    other medical condition    an unusual or allergic reaction to rosuvastatin, other medicines, foods, dyes, or preservatives    pregnant or trying to get pregnant    breast-feeding  How should I use this medicine?  Take this medicine by mouth with a glass of water. Follow the directions on the prescription label. Do not cut, crush or chew this medicine. You can take this  medicine with or without food. Take your doses at regular intervals. Do not take your medicine more often than directed.  Talk to your pediatrician regarding the use of this medicine in children. While this drug may be prescribed for children as young as 8 years old for selected conditions, precautions do apply.  Overdosage: If you think you have taken too much of this medicine contact a poison control center or emergency room at once.  NOTE: This medicine is only for you. Do not share this medicine with others.  What if I miss a dose?  If you miss a dose, take it as soon as you can. Do not take 2 doses within 12 hours of each other. If there are less than 12 hours until your next dose, take only that dose. Do not take double or extra doses.  What may interact with this medicine?  Do not take this medicine with any of the following medications:    herbal medicines like red yeast rice  This medicine may also interact with the following medications:    alcohol    antacids containing aluminum hydroxide or magnesium hydroxide    cyclosporine    other medicines for high cholesterol    some medicines for HIV infection    warfarin  This list may not describe all possible interactions. Give your health care provider a list of all the medicines, herbs, non-prescription drugs, or dietary supplements you use. Also tell them if you smoke, drink alcohol, or use illegal drugs. Some items may interact with your medicine.  What should I watch for while using this medicine?  Visit your doctor or health care professional for regular check-ups. You may need regular tests to make sure your liver is working properly.  Tell your doctor or health care professional right away if you get any unexplained muscle pain, tenderness, or weakness, especially if you also have a fever and tiredness. Your doctor or health care professional may tell you to stop taking this medicine if you develop muscle problems. If your muscle problems do not go away  after stopping this medicine, contact your health care professional.  This medicine may affect blood sugar levels. If you have diabetes, check with your doctor or health care professional before you change your diet or the dose of your diabetic medicine.  Avoid taking antacids containing aluminum, calcium or magnesium within 2 hours of taking this medicine.  This drug is only part of a total heart-health program. Your doctor or a dietician can suggest a low-cholesterol and low-fat diet to help. Avoid alcohol and smoking, and keep a proper exercise schedule.  Do not use this drug if you are pregnant or breast-feeding. Serious side effects to an unborn child or to an infant are possible. Talk to your doctor or pharmacist for more information.  What side effects may I notice from receiving this medicine?  Side effects that you should report to your doctor or health care professional as soon as possible:    allergic reactions like skin rash, itching or hives, swelling of the face, lips, or tongue    dark urine    fever    joint pain    muscle cramps, pain    redness, blistering, peeling or loosening of the skin, including inside the mouth    trouble passing urine or change in the amount of urine    unusually weak or tired    yellowing of the eyes or skin  Side effects that usually do not require medical attention (report to your doctor or health care professional if they continue or are bothersome):    constipation    heartburn    nausea    stomach gas, pain, upset  This list may not describe all possible side effects. Call your doctor for medical advice about side effects. You may report side effects to FDA at 7-190-FDA-3766.  Where should I keep my medicine?  Keep out of the reach of children.  Store at room temperature between 20 and 25 degrees C (68 and 77 degrees F). Keep container tightly closed (protect from moisture). Throw away any unused medicine after the expiration date.  NOTE:This sheet is a summary. It may  not cover all possible information. If you have questions about this medicine, talk to your doctor, pharmacist, or health care provider. Copyright  2016 Gold Standard

## 2020-03-11 NOTE — LETTER
3/11/2020      RE: Evan Gregg  [address] [facility] [address]       Dear Colleague,    Thank you for the opportunity to participate in the care of your patient, Evan Gregg, at the [facility] at [facility]. Please see a copy of my visit note below.    [id number]      Please do not hesitate to contact me if you have any questions/concerns.     Sincerely,     Dante Kamara MD
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
## 2020-03-25 ENCOUNTER — TELEPHONE (OUTPATIENT)
Dept: DERMATOLOGY | Facility: CLINIC | Age: 65
End: 2020-03-25

## 2020-03-25 NOTE — TELEPHONE ENCOUNTER
Called and spoke with patient. Patient verbalized understanding of moving current appointment out to end of June. Patient will review Mychart for new reminder.

## 2020-05-04 ENCOUNTER — TELEPHONE (OUTPATIENT)
Dept: UROLOGY | Facility: CLINIC | Age: 65
End: 2020-05-04

## 2020-05-04 DIAGNOSIS — N50.89 TESTICULAR MASS: Primary | ICD-10-CM

## 2020-05-04 NOTE — TELEPHONE ENCOUNTER
Message left returning pt's call.     Orders for Testicular US placed per protocol.     Message routed to scheduling team.

## 2020-05-04 NOTE — TELEPHONE ENCOUNTER
[facility] Call Center    Phone Message    May a detailed message be left on voicemail: yes     Reason for Call: Other: John has a mass on his left testicle that was first noticed on 5/1/20. John would like to be scheduled to see Dr. Spence to evaluate the mass. Per COVID-19 protocols, it is recommended that an encounter be sent over. Please give John a call back at your earliest convenience to discuss.     Action Taken: Message routed to:  [facility]  [facility]    Travel Screening: Not Applicable

## 2020-05-04 NOTE — TELEPHONE ENCOUNTER
Left message for pt to call and schedule virtual visit consult with Josh or Bebe and to have a testicular ultrasound done prior to that appointment.     Ultrasound is provider requested.       Call Center: Please transfer to imaging after scheduling consult.

## 2020-05-05 ENCOUNTER — PRE VISIT (OUTPATIENT)
Dept: UROLOGY | Facility: CLINIC | Age: 65
End: 2020-05-05

## 2020-05-05 NOTE — TELEPHONE ENCOUNTER
Reason for Visit: Consult    Diagnosis: Testicular Lump    Orders/Procedures/Records: US scheduled for 5/8    Contact Patient: n/a    Rooming Requirements: Video Visit      Priyanka Rutherford LPN  05/05/20  11:46 AM

## 2020-05-05 NOTE — TELEPHONE ENCOUNTER
MEDICAL RECORDS REQUEST   [facility]  Urology Clinic  [address] [place] [address]  PHONE: [phone]  Fax: [phone]        FUTURE VISIT INFORMATION                                                   Evan Gregg, : 1955 scheduled for future visit at [facility]    APPOINTMENT INFORMATION:    Date: 20 8:20AM     Provider:  Karthik Moreno MD    Reason for Visit/Diagnosis: Testicular mass    REFERRAL INFORMATION:    Referring provider:  Self    Specialty: N/A    Referring providers clinic:  N/A    Clinic contact number:  N/A    RECORDS REQUESTED FOR VISIT                                                     NOTES  STATUS/DETAILS   OFFICE NOTE from referring provider  no   OFFICE NOTE from other specialist  no   DISCHARGE SUMMARY from hospital  no   DISCHARGE REPORT from the ER  no   OPERATIVE REPORT  no   MEDICATION LIST  no     PRE-VISIT CHECKLIST      Record collection complete Yes- Internal recs in epic  Imaging in [facility] PACS 20   Appointment appropriately scheduled           (right time/right provider) Yes   MyChart activation Yes   Questionnaire complete If no, please explain: In process      Completed by: Linsey Nails

## 2020-05-08 ENCOUNTER — ANCILLARY PROCEDURE (OUTPATIENT)
Dept: ULTRASOUND IMAGING | Facility: CLINIC | Age: 65
End: 2020-05-08
Attending: UROLOGY
Payer: COMMERCIAL

## 2020-05-08 DIAGNOSIS — N50.89 TESTICULAR MASS: ICD-10-CM

## 2020-05-14 ENCOUNTER — VIRTUAL VISIT (OUTPATIENT)
Dept: UROLOGY | Facility: CLINIC | Age: 65
End: 2020-05-14
Payer: COMMERCIAL

## 2020-05-14 ENCOUNTER — PRE VISIT (OUTPATIENT)
Dept: UROLOGY | Facility: CLINIC | Age: 65
End: 2020-05-14

## 2020-05-14 DIAGNOSIS — Z12.5 SCREENING FOR PROSTATE CANCER: Primary | ICD-10-CM

## 2020-05-14 NOTE — PROGRESS NOTES
"Evan Gregg is a 65 year old male who is being evaluated via a billable video visit.      The patient has been notified of following:     \"This video visit will be conducted via a call between you and your physician/provider. We have found that certain health care needs can be provided without the need for an in-person physical exam.  This service lets us provide the care you need with a video conversation.  If a prescription is necessary we can send it directly to your pharmacy.  If lab work is needed we can place an order for that and you can then stop by our lab to have the test done at a later time.    Video visits are billed at different rates depending on your insurance coverage.  Please reach out to your insurance provider with any questions.    If during the course of the call the physician/provider feels a video visit is not appropriate, you will not be charged for this service.\"    Patient has given verbal consent for Video visit? Yes    How would you like to obtain your AVS? Laura    Patient would like the video invitation sent by: email    Will anyone else be joining your video visit? No        Video-Visit Details    Type of service:  Video Visit    Video Start Time: 8:18 AM  Video End Time: 8:29 AM    Originating Location (pt. Location): work    Distant Location (provider location):  [facility] UROLOGY AND [facility] [[email or web address]] FOR PROSTATE AND UROLOGIC CANCERS     Platform used for Video Visit: [employer]        I am seeing Evan Gregg in consultation for evaluation of left scrotal mass.    HPI:  Evan Gregg is a 65 year old male who last week noted a lump near the left testicle on self-exam.  Was feeling fullness of the top of the left testis.  Scrotal ultrasound obtained.    I reviewed the report and images from his scrotal ultrasound on 5/8/20.   This shows normal testicles, no masses or cysts.  No varicocele    He did have a vasectomy many years ago.  He has not had a recent PSA test    REVIEW OF " SYSTEMS:  General: negative  Skin: negative  Eyes: negative  Ears/Nose/Throat: negative  Respiratory: negative  Cardiovascular: negative  Gastrointestinal: negative  Genitourinary: see HPI.  He does have some minor lower urinary tract symptoms, primarily slower flow.  Musculoskeletal: negative  Neurologic: negative  Psychiatric: negative  Hematologic/Lymphatic/Immunologic: negative  Endocrine: negative    PAST MEDICAL HX:  Past Medical History:   Diagnosis Date     Anxiety 7/23/2019     Arthritis 3/25/2014     Depressive disorder 7/23/2019     Hearing problem 7/23/2019     Reduced vision 7/23/2019       PAST SURG HX:  Past Surgical History:   Procedure Laterality Date     COLONOSCOPY  11/11/2013    Procedure: COLONOSCOPY;  Colonoscopy;  Surgeon: Montana Corona MD;  Location: [facility] [facility]     HIP SURGERY  June, 2016    Resurfacing of right hip.      ORTHOPEDIC SURGERY  Summer 2016    Bilateral hip resurfacing        FAMILY HX:  Family History   Problem Relation Age of Onset     Thyroid Disease Mother      Heart Disease Father 56        heart attack     Cancer Father         skin cancer     Lipids Father      Hypertension Father      Cancer - colorectal Maternal Grandfather      Thyroid Disease Brother      Cancer Brother         skin ca     Cancer Maternal Uncle 60        Lung     Glaucoma No family hx of      Macular Degeneration No family hx of        SOCIAL HX:  Social History     Tobacco Use     Smoking status: Never Smoker     Smokeless tobacco: Never Used   Substance Use Topics     Alcohol use: Yes     Comment: ocass     Drug use: No       MEDICATIONS:  Current Outpatient Medications   Medication Sig     aspirin (ASA) 81 MG EC tablet Take 1 tablet (81 mg) by mouth daily     ibuprofen (ADVIL/MOTRIN) 800 MG tablet Take 800 mg by mouth every 8 hours as needed for moderate pain     metroNIDAZOLE (METROGEL) 0.75 % gel Apply pea-sized amount or less to cheeks and nose as needed.     rosuvastatin (CRESTOR) 20 MG  tablet Take 1 tablet (20 mg) by mouth daily     traZODone (DESYREL) 50 MG tablet Take 1 tablet (50 mg) by mouth nightly as needed for sleep (Patient not taking: Reported on 3/11/2020)     triamcinolone (KENALOG) 0.025 % cream mix 50/50 with Eucerin for eczema. (Patient will mix himself)     No current facility-administered medications for this visit.        ALLERGIES:  No known drug allergies      GENERAL PHYSICAL EXAM:   Exam  General- Alert, oriented, nad.  Pleasant and conversant.  Eyes- anicteric, EOMI.  Resps- normal, non-labored.  No cough  Abdomen-  nondistended.   exam- deferred.   Neurological - no tremors  Skin - no discoloration/ lesions noted  Psychiatric - no anxiety, alert & oriented.       The rest of a comprehensive physical examination is deferred due to PHE (public health emergency) video visit restrictions.      Imaging/labs:  Lab Results   Component Value Date    CR 0.90 12/04/2003     Lab Results   Component Value Date    PSA 0.51 12/10/2009         ASSESSMENT:     Prostate cancer screening     Full left epididymis.    PLAN:    Advised observation and monthly self exam for full left epididymis.  I think this is just a congested epididymis after him having had a vasectomy.  There is no spermatocele, no evidence of tumor.    He asked about a PSA test for prostate cancer screening.  I put a future order for this.  I discussed that routine prostate cancer screening consists of a digital rectal exam, and PSA.  We reviewed briefly the AUA PSA screening guidelines for asymptomatic men.    Follow-up PRN     I will contact him with the future PSA result via my chart    Karthik Moreno MD     Urological Surgeon    Dictation software utilized for this note

## 2020-06-29 ENCOUNTER — OFFICE VISIT (OUTPATIENT)
Dept: DERMATOLOGY | Facility: CLINIC | Age: 65
End: 2020-06-29
Payer: COMMERCIAL

## 2020-06-29 DIAGNOSIS — L20.89 OTHER ATOPIC DERMATITIS: ICD-10-CM

## 2020-06-29 DIAGNOSIS — Z85.828 HISTORY OF NONMELANOMA SKIN CANCER: Primary | ICD-10-CM

## 2020-06-29 DIAGNOSIS — L71.9 ROSACEA: ICD-10-CM

## 2020-06-29 ASSESSMENT — PAIN SCALES - GENERAL: PAINLEVEL: NO PAIN (0)

## 2020-06-29 NOTE — LETTER
6/29/2020       RE: Evan Gregg  [address] [facility] [address]     Dear Colleague,    Thank you for referring your patient, Evan Gregg, to the [facility] DERMATOLOGY at [facility]. Please see a copy of my visit note below.    [facility] Dermatology Note      Dermatology Problem List:  1. Rosacea, no flares for years Treated with topical metronidazole in past.   2. Eczema  3. Contact dermatitis, secondary to poison ivy exposure.   4. History BCC 2013, left forearm s/p ED&C    Encounter Date: Jun 29, 2020    CC:   Chief Complaint   Patient presents with     Derm Problem     John is here today for a full skin exam. Had BCC 7 years ago.     History of Present Illness:  Mr. Evan Gregg is a 65 year old male who presents for skin exam. He was last seen 5/18/2016 at which time he was doing well. Today he has a few areas of concern, all of which he states are rough, raised bumps that he picks at. He denies anything tender, changing, growing or bleeding. He has not had any rosacea flares, but does have metronidazole gel to use. He thinks the gel irritates his face so sometimes he mixes triamcinolone with eucerin and uses it, although he knows steroids on his face are not good to use. He also has eczema, mainly on his hands and uses triamcinolone when that flares. Otherwise no concerns today.      Past Medical History:   Patient Active Problem List   Diagnosis     Rosacea     CARDIOVASCULAR SCREENING; LDL GOAL LESS THAN 160     Plantar wart of left foot     BCC (basal cell carcinoma), arm     Osteoarthritis of hip     Shoulder pain, right     Right elbow pain     Past Medical History:   Diagnosis Date     Anxiety 7/23/2019     Arthritis 3/25/2014     Depressive disorder 7/23/2019     Hearing problem 7/23/2019     Reduced vision 7/23/2019     Past Surgical History:   Procedure Laterality Date     COLONOSCOPY  11/11/2013    Procedure:  COLONOSCOPY;  Colonoscopy;  Surgeon: Montana Corona MD;  Location: [place] [facility]     HIP SURGERY  June, 2016    Resurfacing of right hip.      ORTHOPEDIC SURGERY  Summer 2016    Bilateral hip resurfacing       Social History:  The patient works indoors. The patient denies use of tanning beds.    Family History:  Family history reviewed and non-contributory.      Medications:  Current Outpatient Medications   Medication Sig Dispense Refill     aspirin (ASA) 81 MG EC tablet Take 1 tablet (81 mg) by mouth daily 90 tablet 3     ibuprofen (ADVIL/MOTRIN) 800 MG tablet Take 800 mg by mouth every 8 hours as needed for moderate pain       metroNIDAZOLE (METROGEL) 0.75 % gel Apply pea-sized amount or less to cheeks and nose as needed. 45 g 1     rosuvastatin (CRESTOR) 20 MG tablet Take 1 tablet (20 mg) by mouth daily 90 tablet 3     triamcinolone (KENALOG) 0.025 % cream mix 50/50 with Eucerin for eczema. (Patient will mix himself) 240 g 5     traZODone (DESYREL) 50 MG tablet Take 1 tablet (50 mg) by mouth nightly as needed for sleep (Patient not taking: Reported on 3/11/2020) 30 tablet 3        Allergies   Allergen Reactions     No Known Drug Allergies        Review of Systems:  -Skin/Heme New Pt: The patient denies frequent sun exposure. The patient denies excessive scarring or problems healing except as per HPI. The patient denies excessive bleeding.  -Constitutional: The patient denies fatigue, fevers, chills, unintended weight loss, and night sweats.  -HEENT: Patient denies nonhealing oral sores.  -Skin: As above in HPI. No additional skin concerns.    Physical exam:  Vitals: There were no vitals taken for this visit.  GEN: This is a well developed, well-nourished male in no acute distress, in a pleasant mood.    SKIN: Full skin exam including head, neck, chest, abdomen, back, bilateral upper and lower extremities, nails, buttocks and scalp was performed and reveals:  - Left temple with brown waxy stuck on papules  -There  "are rare erythematous papules and scattered telangectasias on the bilateral cheeks and nose.  -Well healed scar on left forearm at site of prior BCC  - Scattered lentigines in sun exposed areas  - Waxy stuck on papules scattered on chest, back, left leg, bilateral forearms  -No other lesions of concern on areas examined.     Impression/Plan:  1. Rosacea.  Previous dx.  Treated only with metronidazole gel but gel was irritating.  No flares for many years per pt report.  Notable conjunctival injection bilaterally, but this does not fit with \"ocular\" rosacea given minimal erythema/edema of bilateral lids.      If flare, will send metronidazole cream to his pharmacy    2. Eczematous dermatitis.  0.1% TMC as above.  Under control per patient report.  Minimal flares as of late.  Minimal amount of plaque/scaling noted on exam today.       TMC 0.1% as needed to affected areas.      Discussed with patient not to use TMC on face, axilla or groin areas if rash develops.        3. History of non-melanoma skin cancer without evidence of recurrence. Discussed sun protection. Hand-out provided.    Follow-up 1 year, earlier for new or changing lesions.     Dr. Avila staffed the patient.    Staff Involved:  Resident(Carrie)/Staff(as above)    Corey Butler MD  Dermatology Resident, PGY-2      I, Angela Avila MD, saw this patient with the resident and agree with the resident s findings and plan of care as documented in the resident s note.      Again, thank you for allowing me to participate in the care of your patient.      Sincerely,    Angela Avila MD      "

## 2020-06-29 NOTE — PROGRESS NOTES
[facility] Dermatology Note      Dermatology Problem List:  1. Rosacea, no flares for years Treated with topical metronidazole in past.   2. Eczema  3. Contact dermatitis, secondary to poison ivy exposure.   4. History BCC 2013, left forearm s/p ED&C    Encounter Date: Jun 29, 2020    CC:   Chief Complaint   Patient presents with     Derm Problem     John is here today for a full skin exam. Had BCC 7 years ago.     History of Present Illness:  Mr. Evan Gregg is a 65 year old male who presents for skin exam. He was last seen 5/18/2016 at which time he was doing well. Today he has a few areas of concern, all of which he states are rough, raised bumps that he picks at. He denies anything tender, changing, growing or bleeding. He has not had any rosacea flares, but does have metronidazole gel to use. He thinks the gel irritates his face so sometimes he mixes triamcinolone with eucerin and uses it, although he knows steroids on his face are not good to use. He also has eczema, mainly on his hands and uses triamcinolone when that flares. Otherwise no concerns today.      Past Medical History:   Patient Active Problem List   Diagnosis     Rosacea     CARDIOVASCULAR SCREENING; LDL GOAL LESS THAN 160     Plantar wart of left foot     BCC (basal cell carcinoma), arm     Osteoarthritis of hip     Shoulder pain, right     Right elbow pain     Past Medical History:   Diagnosis Date     Anxiety 7/23/2019     Arthritis 3/25/2014     Depressive disorder 7/23/2019     Hearing problem 7/23/2019     Reduced vision 7/23/2019     Past Surgical History:   Procedure Laterality Date     COLONOSCOPY  11/11/2013    Procedure: COLONOSCOPY;  Colonoscopy;  Surgeon: Montana Corona MD;  Location: [facility] [facility]     HIP SURGERY  June, 2016    Resurfacing of right hip.      ORTHOPEDIC SURGERY  Summer 2016    Bilateral hip resurfacing       Social History:  The patient works indoors. The patient denies use of tanning  beds.    Family History:  Family history reviewed and non-contributory.      Medications:  Current Outpatient Medications   Medication Sig Dispense Refill     aspirin (ASA) 81 MG EC tablet Take 1 tablet (81 mg) by mouth daily 90 tablet 3     ibuprofen (ADVIL/MOTRIN) 800 MG tablet Take 800 mg by mouth every 8 hours as needed for moderate pain       metroNIDAZOLE (METROGEL) 0.75 % gel Apply pea-sized amount or less to cheeks and nose as needed. 45 g 1     rosuvastatin (CRESTOR) 20 MG tablet Take 1 tablet (20 mg) by mouth daily 90 tablet 3     triamcinolone (KENALOG) 0.025 % cream mix 50/50 with Eucerin for eczema. (Patient will mix himself) 240 g 5     traZODone (DESYREL) 50 MG tablet Take 1 tablet (50 mg) by mouth nightly as needed for sleep (Patient not taking: Reported on 3/11/2020) 30 tablet 3        Allergies   Allergen Reactions     No Known Drug Allergies        Review of Systems:  -Skin/Heme New Pt: The patient denies frequent sun exposure. The patient denies excessive scarring or problems healing except as per HPI. The patient denies excessive bleeding.  -Constitutional: The patient denies fatigue, fevers, chills, unintended weight loss, and night sweats.  -HEENT: Patient denies nonhealing oral sores.  -Skin: As above in HPI. No additional skin concerns.    Physical exam:  Vitals: There were no vitals taken for this visit.  GEN: This is a well developed, well-nourished male in no acute distress, in a pleasant mood.    SKIN: Full skin exam including head, neck, chest, abdomen, back, bilateral upper and lower extremities, nails, buttocks and scalp was performed and reveals:  - Left temple with brown waxy stuck on papules  -There are rare erythematous papules and scattered telangectasias on the bilateral cheeks and nose.  -Well healed scar on left forearm at site of prior BCC  - Scattered lentigines in sun exposed areas  - Waxy stuck on papules scattered on chest, back, left leg, bilateral forearms  -No other  "lesions of concern on areas examined.     Impression/Plan:  1. Rosacea.  Previous dx.  Treated only with metronidazole gel but gel was irritating.  No flares for many years per pt report.  Notable conjunctival injection bilaterally, but this does not fit with \"ocular\" rosacea given minimal erythema/edema of bilateral lids.      If flare, will send metronidazole cream to his pharmacy    2. Eczematous dermatitis.  0.1% TMC as above.  Under control per patient report.  Minimal flares as of late.  Minimal amount of plaque/scaling noted on exam today.       TMC 0.1% as needed to affected areas.      Discussed with patient not to use TMC on face, axilla or groin areas if rash develops.        3. History of non-melanoma skin cancer without evidence of recurrence. Discussed sun protection. Hand-out provided.    Follow-up 1 year, earlier for new or changing lesions.     Dr. Avila staffed the patient.    Staff Involved:  Resident(Carrie)/Staff(as above)    Corey Butler MD  Dermatology Resident, PGY-2      I, Angela Avila MD, saw this patient with the resident and agree with the resident s findings and plan of care as documented in the resident s note.    "

## 2020-06-29 NOTE — PATIENT INSTRUCTIONS
Aveeno Active Natural Protection Mineral Block Lotion SPF 30  Aveeno Baby Natural Protection Face Stick SPF 50+  Banana Boat Natural Reflect (baby or kids) SPF 50+  Bare Republic SPR 50 Stick   Beauty Countersun Mineral Sunscreen Stick SPF 30  Forsyth s Bees Chemical-Free Sunscreen SPF 30  Blue Lizard Baby SPF 30+  Blue Lizard for Sensitive Skin SPF 30+  Cotz Pediatric Pure SPF 30  Cotz Pediatric Face SPF 40  Cotz 20% Zinc SPF 35  CVS Sensitive Skin 30  CVS Baby Lotion Sunscreen SPF 60+  EltaMD UV Physical Broad-Spectrum SPF 41  La Roche-Posay Anthelios Mineral Zinc Oxide Sunscreen SPF 50  Mustella Broad Spectrum SPF 50+/Mineral Sunscreen Stick  Neutrogena Sensitive Skin- Pure and Free Baby SPF 30  Neutrogena Sensitive Skin-Pure and Free Baby  SPF 50+  Neutrogena Sheer Zinc Oxide Dry-Touch Face Sunscreen with Broad Spectrum SPF 50, Oil-Free, Non-Comedogenic & Non-Greasy Mineral Sunscreen  Thinkbaby Safe Sunscreen SPF 50+,   Thinksport Sunscreen SPF 50+,   PreSun Sensitive Sunblock SPF 28  Vanicream Sunscreen for Sensitive Skin SPF 30 or 50  [employer] s Sensitive Skin SPF 70    * Titanium dioxide, zinc oxide*

## 2020-06-29 NOTE — NURSING NOTE
Chief Complaint   Patient presents with     Derm Problem     John is here today for a full skin exam. Had BCC 7 years ago.     OCHOA VAZQUEZ on 6/29/2020 at 9:02 AM

## 2020-10-13 ENCOUNTER — TRANSFERRED RECORDS (OUTPATIENT)
Dept: HEALTH INFORMATION MANAGEMENT | Facility: CLINIC | Age: 65
End: 2020-10-13

## 2020-11-16 ENCOUNTER — HEALTH MAINTENANCE LETTER (OUTPATIENT)
Age: 65
End: 2020-11-16

## 2021-01-15 ENCOUNTER — HOSPITAL ENCOUNTER (EMERGENCY)
Facility: CLINIC | Age: 66
Discharge: HOME | End: 2021-01-15
Attending: NURSE PRACTITIONER | Admitting: NURSE PRACTITIONER
Payer: COMMERCIAL

## 2021-01-15 ENCOUNTER — TELEPHONE (OUTPATIENT)
Dept: FAMILY MEDICINE | Facility: CLINIC | Age: 66
End: 2021-01-15

## 2021-01-15 VITALS
WEIGHT: 207 LBS | HEART RATE: 53 BPM | RESPIRATION RATE: 18 BRPM | BODY MASS INDEX: 29.7 KG/M2 | TEMPERATURE: 98.7 F | OXYGEN SATURATION: 96 % | SYSTOLIC BLOOD PRESSURE: 125 MMHG | DIASTOLIC BLOOD PRESSURE: 75 MMHG
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
## 2021-01-15 PROCEDURE — G0463 HOSPITAL OUTPT CLINIC VISIT: HCPCS | Performed by: NURSE PRACTITIONER

## 2021-01-15 PROCEDURE — 99214 OFFICE O/P EST MOD 30 MIN: CPT | Performed by: NURSE PRACTITIONER

## 2021-01-15 NOTE — ED AVS SNAPSHOT
[facility] Emergency Dept  [address] [facility] [address]  Phone: [phone]  Fax: [phone]                                    Evan Gregg   MRN: [id number]    Department: [facility] Emergency Dept   Date of Visit: 1/15/2021           After Visit Summary Signature Page    I have received my discharge instructions, and my questions have been answered. I have discussed any challenges I see with this plan with the nurse or doctor.    ..........................................................................................................................................  Patient/Patient Representative Signature      ..........................................................................................................................................  Patient Representative Print Name and Relationship to Patient    ..................................................               ................................................  Date                                   Time    ..........................................................................................................................................  Reviewed by Signature/Title    ...................................................              ..............................................  Date                                               Time          22EPIC Rev 08/18

## 2021-01-15 NOTE — ED PROVIDER NOTES
"  History     Chief Complaint   Patient presents with     squirrel bite     Pt was bitten, on the R hand, by a squirrel while he was trying to save it from his dogs.      HPI  Evan Gregg is a 65 year old male who presents to urgent care with a squirrel bite and squirrel scratches on his right hand incurred this morning.  Patient states that he had squirrels at his bird feeder and he let his dogs out and the dogs grabbed the squirrel and were playing with it.  Patient states he was trying to save the squirrel and subsequently was bitten and scratched by the squirrel.  Patient states the school did not \"make it\" and he wonders if it should be tested for rabies.  Patient reports mild discomfort but otherwise feels fine.    Allergies:  Allergies   Allergen Reactions     No Known Drug Allergies        Problem List:    Patient Active Problem List    Diagnosis Date Noted     Shoulder pain, right 02/01/2018     Priority: Medium     Right elbow pain 02/01/2018     Priority: Medium     Osteoarthritis of hip 03/20/2015     Priority: Medium     BCC (basal cell carcinoma), arm 06/05/2013     Priority: Medium     Plantar wart of left foot 10/10/2012     Priority: Medium     CARDIOVASCULAR SCREENING; LDL GOAL LESS THAN 160 10/31/2010     Priority: Medium     Rosacea 01/11/2008     Priority: Medium        Past Medical History:    Past Medical History:   Diagnosis Date     Anxiety 7/23/2019     Arthritis 3/25/2014     Depressive disorder 7/23/2019     Hearing problem 7/23/2019     Reduced vision 7/23/2019       Past Surgical History:    Past Surgical History:   Procedure Laterality Date     COLONOSCOPY  11/11/2013    Procedure: COLONOSCOPY;  Colonoscopy;  Surgeon: Montana Corona MD;  Location: [facility]     HIP SURGERY  June, 2016    Resurfacing of right hip.      ORTHOPEDIC SURGERY  Summer 2016    Bilateral hip resurfacing       Family History:    Family History   Problem Relation Age of Onset     Thyroid Disease Mother  "     Heart Disease Father 56        heart attack     Cancer Father         skin cancer     Lipids Father      Hypertension Father      Cancer - colorectal Maternal Grandfather      Thyroid Disease Brother      Cancer Brother         skin ca     Cancer Maternal Uncle 60        Lung     Glaucoma No family hx of      Macular Degeneration No family hx of        Social History:  Marital Status:   [2]  Social History     Tobacco Use     Smoking status: Never Smoker     Smokeless tobacco: Never Used   Substance Use Topics     Alcohol use: Yes     Comment: ocass     Drug use: No        Medications:         amoxicillin-clavulanate (AUGMENTIN) 875-125 MG tablet       aspirin (ASA) 81 MG EC tablet       ibuprofen (ADVIL/MOTRIN) 800 MG tablet       metroNIDAZOLE (METROGEL) 0.75 % gel       rosuvastatin (CRESTOR) 20 MG tablet       traZODone (DESYREL) 50 MG tablet       triamcinolone (KENALOG) 0.025 % cream          Review of Systems  As mentioned above in the history present illness. All other systems were reviewed and are negative.    Physical Exam   BP: 125/75  Pulse: 53  Temp: 98.7  F (37.1  C)  Resp: 18  Weight: 93.9 kg (207 lb)  SpO2: 96 %      Physical Exam  Vitals signs and nursing note reviewed.   Constitutional:       General: He is not in acute distress.     Appearance: He is well-developed. He is not diaphoretic.   HENT:      Head: Normocephalic and atraumatic.      Right Ear: Hearing and external ear normal.      Left Ear: Hearing and external ear normal.      Nose: Nose normal.   Eyes:      General: No scleral icterus.        Right eye: No discharge.         Left eye: No discharge.      Conjunctiva/sclera: Conjunctivae normal.   Cardiovascular:      Rate and Rhythm: Normal rate and regular rhythm.      Heart sounds: Normal heart sounds. No murmur. No friction rub. No gallop.    Pulmonary:      Effort: Pulmonary effort is normal. No respiratory distress.      Breath sounds: Normal breath sounds. No stridor. No  wheezing or rales.   Musculoskeletal:      Right hand: He exhibits tenderness (2 small 7 mm scratches noted on dorsum of right hand and two small punture wounds noted dorsal and volar aspect of right index finger with surrounding erythema noted on index finger injury - there is no pustular drainage orswelling or bony deformity noted.).   Skin:     General: Skin is warm.      Capillary Refill: Capillary refill takes less than 2 seconds.      Findings: No rash.   Neurological:      Mental Status: He is alert and oriented to person, place, and time.   Psychiatric:         Behavior: Behavior is cooperative.         ED Course        Procedures    No results found for this or any previous visit (from the past 24 hour(s)).    Medications - No data to display    Assessments & Plan (with Medical Decision Making)  65-year-old male presenting to urgent care with a squirrel bite and scratch sustained on patient's right hand and right index finger.  The squirrel did not appear rabid.  This squirrel was being chased by his dogs and he caught the squirrel after it had been already caught by the dogs and sustained scratching and a bite from the squirrel.  The squirrel subsequently passed away.  Patient reports he is feeling fine with some mild tenderness.  Patient denies loss of sensation or range of motion.  There is no pustular drainage.  [place] Department of health called and they recommend no rabies testing on a squirrel.  Reviewed these findings with patient     I have reviewed the nursing notes.    I have reviewed the findings, diagnosis, plan and need for follow up with the patient.    Discharge Medication List as of 1/15/2021  1:44 PM      START taking these medications    Details   amoxicillin-clavulanate (AUGMENTIN) 875-125 MG tablet Take 1 tablet by mouth 2 times daily for 10 days, Disp-20 tablet, R-0, E-Prescribe             Final diagnoses:   Bitten by squirrel, initial encounter       1/15/2021   [facility]  [place] [facility]     Mohan, Francine Knight, APRN CNP  01/15/21 [id number]

## 2021-01-15 NOTE — DISCHARGE INSTRUCTIONS
Start augmentin and take this as soon as possible and the second dose at supper or bedtime.  Continue taking the antibiotic twice daily for ten days.  Return to the urgent care or ED if you develop redness and swelling extending up beyond your wrist or fever of 100.5 or greater.  I called [place] Department of [facility] and spoke with a specialist who advised that there is no need for testing of squirrels as the risk is so low for rabies.

## 2021-01-15 NOTE — TELEPHONE ENCOUNTER
Reason for Call:  Other call back    Detailed comments: Patient called and says he got bit by a squirrel today in the finger on his right hand and it roseline blood.    Phone Number Patient can be reached at: Home number on file [phone] (home)    Best Time:     Can we leave a detailed message on this number? YES    Call taken on 1/15/2021 at 10:00 AM by Angela Zarate

## 2021-01-15 NOTE — TELEPHONE ENCOUNTER
S-(situation): animal bite    B-(background): today    A-(assessment): patient was called, he says he picked up a squirrel today after trying to rescue it from his dogs and it bit him on the right finger and roseline blood, the squirrel looked healthy, the patient says his right finger is red, not bleeding now, he scrubbed it with alcohol and peroxide.    R-(recommendations): advised ER to evaluate for potential rabies as bit by wild animal. Patient agrees with this plan.    ROGELIO Bustos

## 2021-02-25 DIAGNOSIS — I25.10 CORONARY ARTERY DISEASE INVOLVING NATIVE CORONARY ARTERY OF NATIVE HEART WITHOUT ANGINA PECTORIS: ICD-10-CM

## 2021-02-25 DIAGNOSIS — I71.20 THORACIC AORTIC ANEURYSM WITHOUT RUPTURE (H): Primary | ICD-10-CM

## 2021-02-25 DIAGNOSIS — Z12.5 SCREENING FOR PROSTATE CANCER: ICD-10-CM

## 2021-02-25 LAB
CHOLEST SERPL-MCNC: 132 MG/DL
HDLC SERPL-MCNC: 48 MG/DL
LDLC SERPL CALC-MCNC: 65 MG/DL
NONHDLC SERPL-MCNC: 84 MG/DL
PSA SERPL-ACNC: 0.6 UG/L (ref 0–4)
TRIGL SERPL-MCNC: 99 MG/DL

## 2021-02-25 PROCEDURE — 80061 LIPID PANEL: CPT | Performed by: PATHOLOGY

## 2021-02-25 PROCEDURE — 36415 COLL VENOUS BLD VENIPUNCTURE: CPT | Performed by: PATHOLOGY

## 2021-02-25 PROCEDURE — G0103 PSA SCREENING: HCPCS | Performed by: PATHOLOGY

## 2021-03-03 ENCOUNTER — TELEPHONE (OUTPATIENT)
Dept: CARDIOLOGY | Facility: CLINIC | Age: 66
End: 2021-03-03

## 2021-03-03 NOTE — TELEPHONE ENCOUNTER
Called and lvm to see if he can get a ECHO and LABS scheduled before Dr Kamara appt 3/17. He lives in [place] so maybe would like both to be scheduled in [place]. The [place] [facility] # [phone]    Did give their number and to see if they will transfer to schedule labs    Gave [facility] number to see about scheduling both also.

## 2021-03-05 ENCOUNTER — TELEPHONE (OUTPATIENT)
Dept: CARDIOLOGY | Facility: CLINIC | Age: 66
End: 2021-03-05

## 2021-03-05 ENCOUNTER — MYC MEDICAL ADVICE (OUTPATIENT)
Dept: FAMILY MEDICINE | Facility: CLINIC | Age: 66
End: 2021-03-05

## 2021-03-05 DIAGNOSIS — R91.8 PULMONARY NODULES: ICD-10-CM

## 2021-03-05 NOTE — TELEPHONE ENCOUNTER
Called and LVM to schedule echo and labs, needs to get this before he see's Dr. Kamara  3/17/21    Left [facility] imaging number and ours to schedule

## 2021-03-08 DIAGNOSIS — Z12.5 SCREENING FOR PROSTATE CANCER: ICD-10-CM

## 2021-03-08 DIAGNOSIS — I71.20 THORACIC AORTIC ANEURYSM WITHOUT RUPTURE (H): ICD-10-CM

## 2021-03-08 DIAGNOSIS — I25.10 CORONARY ARTERY DISEASE INVOLVING NATIVE CORONARY ARTERY OF NATIVE HEART WITHOUT ANGINA PECTORIS: ICD-10-CM

## 2021-03-08 LAB
ANION GAP SERPL CALCULATED.3IONS-SCNC: 6 MMOL/L (ref 3–14)
BUN SERPL-MCNC: 15 MG/DL (ref 7–30)
CALCIUM SERPL-MCNC: 8.9 MG/DL (ref 8.5–10.1)
CHLORIDE SERPL-SCNC: 104 MMOL/L (ref 94–109)
CHOLEST SERPL-MCNC: 118 MG/DL
CO2 SERPL-SCNC: 28 MMOL/L (ref 20–32)
CREAT SERPL-MCNC: 0.77 MG/DL (ref 0.66–1.25)
ERYTHROCYTE [DISTWIDTH] IN BLOOD BY AUTOMATED COUNT: 11.8 % (ref 10–15)
GFR SERPL CREATININE-BSD FRML MDRD: >90 ML/MIN/{1.73_M2}
GLUCOSE SERPL-MCNC: 104 MG/DL (ref 70–99)
HCT VFR BLD AUTO: 44.8 % (ref 40–53)
HDLC SERPL-MCNC: 44 MG/DL
HGB BLD-MCNC: 15.1 G/DL (ref 13.3–17.7)
LDLC SERPL CALC-MCNC: 53 MG/DL
MCH RBC QN AUTO: 31.3 PG (ref 26.5–33)
MCHC RBC AUTO-ENTMCNC: 33.7 G/DL (ref 31.5–36.5)
MCV RBC AUTO: 93 FL (ref 78–100)
NONHDLC SERPL-MCNC: 74 MG/DL
PLATELET # BLD AUTO: 185 10E9/L (ref 150–450)
POTASSIUM SERPL-SCNC: 4 MMOL/L (ref 3.4–5.3)
RBC # BLD AUTO: 4.82 10E12/L (ref 4.4–5.9)
SODIUM SERPL-SCNC: 137 MMOL/L (ref 133–144)
TRIGL SERPL-MCNC: 105 MG/DL
TSH SERPL DL<=0.005 MIU/L-ACNC: 3.23 MU/L (ref 0.4–4)
WBC # BLD AUTO: 4.2 10E9/L (ref 4–11)

## 2021-03-08 PROCEDURE — 84443 ASSAY THYROID STIM HORMONE: CPT | Performed by: PATHOLOGY

## 2021-03-08 PROCEDURE — 80061 LIPID PANEL: CPT | Performed by: PATHOLOGY

## 2021-03-08 PROCEDURE — 36415 COLL VENOUS BLD VENIPUNCTURE: CPT | Performed by: PATHOLOGY

## 2021-03-08 PROCEDURE — 85027 COMPLETE CBC AUTOMATED: CPT | Performed by: PATHOLOGY

## 2021-03-08 PROCEDURE — 80048 BASIC METABOLIC PNL TOTAL CA: CPT | Performed by: PATHOLOGY

## 2021-03-16 ENCOUNTER — HOSPITAL ENCOUNTER (OUTPATIENT)
Dept: CT IMAGING | Facility: CLINIC | Age: 66
End: 2021-03-16
Attending: FAMILY MEDICINE
Payer: COMMERCIAL

## 2021-03-16 ENCOUNTER — HOSPITAL ENCOUNTER (OUTPATIENT)
Dept: CARDIOLOGY | Facility: CLINIC | Age: 66
End: 2021-03-16
Attending: INTERNAL MEDICINE
Payer: COMMERCIAL

## 2021-03-16 DIAGNOSIS — I71.20 THORACIC AORTIC ANEURYSM WITHOUT RUPTURE (H): ICD-10-CM

## 2021-03-16 DIAGNOSIS — I25.10 CORONARY ARTERY DISEASE INVOLVING NATIVE CORONARY ARTERY OF NATIVE HEART WITHOUT ANGINA PECTORIS: ICD-10-CM

## 2021-03-16 DIAGNOSIS — R91.8 PULMONARY NODULES: ICD-10-CM

## 2021-03-16 PROCEDURE — 71250 CT THORAX DX C-: CPT

## 2021-03-16 PROCEDURE — 93306 TTE W/DOPPLER COMPLETE: CPT | Mod: 26 | Performed by: INTERNAL MEDICINE

## 2021-03-16 PROCEDURE — 93306 TTE W/DOPPLER COMPLETE: CPT

## 2021-03-17 ENCOUNTER — VIRTUAL VISIT (OUTPATIENT)
Dept: CARDIOLOGY | Facility: CLINIC | Age: 66
End: 2021-03-17
Attending: FAMILY MEDICINE
Payer: COMMERCIAL

## 2021-03-17 DIAGNOSIS — E78.5 DYSLIPIDEMIA: ICD-10-CM

## 2021-03-17 DIAGNOSIS — I25.10 CORONARY ARTERY DISEASE INVOLVING NATIVE CORONARY ARTERY OF NATIVE HEART WITHOUT ANGINA PECTORIS: Primary | ICD-10-CM

## 2021-03-17 PROCEDURE — 99215 OFFICE O/P EST HI 40 MIN: CPT | Mod: 95 | Performed by: INTERNAL MEDICINE

## 2021-03-17 RX ORDER — ROSUVASTATIN CALCIUM 20 MG/1
20 TABLET, COATED ORAL DAILY
Qty: 90 TABLET | Refills: 3 | Status: SHIPPED | OUTPATIENT
Start: 2021-03-17 | End: 2022-03-16

## 2021-03-17 NOTE — RESULT ENCOUNTER NOTE
Tung Lopez,  The lung nodules have not changed in nearly 2 years suggesting benign-not cancer cause.  There is no need for follow-up.   MARLON BOWER MD
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
## 2021-03-17 NOTE — PATIENT INSTRUCTIONS
Patient Instructions:  It was a pleasure to see you in the cardiology clinic today.      If you have any questions, you can reach my nurse, Venice CHEEMA LPN, at [phone].  Press Option #1 for the [facility], and then press Option #4 for nursing.    We are encouraging the use of [employer] to communicate with your HealthCare Provider    Medication Changes: None.    Recommendations: Repeat fasting labs in one year prior to seeing Dr Kamara.    Studies Ordered: Echocardiogram in one year prior to seeing Dr Kamara.    The results from today include: Labs and echocardiogram.    Please follow up: With Dr. Kamara in one year.    Sincerely,    Dante Kamara MD     If you have an urgent need after hours (8:00 am to 4:30 pm) please call [phone] and ask for the cardiology fellow on call.
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
## 2021-03-17 NOTE — PROGRESS NOTES
"John is a 65 year old who is being evaluated via a billable video visit.      How would you like to obtain your AVS? mychart  If the video visit is dropped, the invitation should be resent by: text  Will anyone else be joining your video visit? no      Vitals - Patient Reported  Systolic (Patient Reported): 122  Diastolic (Patient Reported): 80  Weight (Patient Reported): 95.3 kg (210 lb)  Height (Patient Reported): 510 cm (16' 8.79\")  BMI (Based on Pt Reported Ht/Wt): 3.66  Pain Score: No Pain (0)      The patient has been notified of following:     \"This video visit will be conducted via a call between you and your physician/provider. We have found that certain health care needs can be provided without the need for an in-person physical exam.  This service lets us provide the care you need with a video conversation.  If a prescription is necessary we can send it directly to your pharmacy.  If lab work is needed we can place an order for that and you can then stop by our lab to have the test done at a later time.    Video visits are billed at different rates depending on your insurance coverage.  Please reach out to your insurance provider with any questions.    If during the course of the call the physician/provider feels a video visit is not appropriate, you will not be charged for this service.\"    Patient has given verbal consent for video visit? Yes    How would you like to obtain your AVS? Mail    Video-Visit Details    Type of service:  Video Visit    Video Start Time:110pm  Video End Time:153pm    Total visit time including video visit, chart review, charting, coordination of care =57min    Originating Location (pt. Location):patient office      Distant Location (provider location):  home office    Platform used for Video Visit: [employer]    See dictation #[id number]  "

## 2021-03-17 NOTE — LETTER
"3/17/2021      RE: Evan Gregg  [address] [facility] [address]       Dear Colleague,    Thank you for the opportunity to participate in the care of your patient, Evan Gregg, at the [facility] [place] [facility]. Please see a copy of my visit note below.    John is a 65 year old who is being evaluated via a billable video visit.      How would you like to obtain your AVS? mychart  If the video visit is dropped, the invitation should be resent by: text  Will anyone else be joining your video visit? no      Vitals - Patient Reported  Systolic (Patient Reported): 122  Diastolic (Patient Reported): 80  Weight (Patient Reported): 95.3 kg (210 lb)  Height (Patient Reported): 510 cm (16' 8.79\")  BMI (Based on Pt Reported Ht/Wt): 3.66  Pain Score: No Pain (0)      The patient has been notified of following:     \"This video visit will be conducted via a call between you and your physician/provider. We have found that certain health care needs can be provided without the need for an in-person physical exam.  This service lets us provide the care you need with a video conversation.  If a prescription is necessary we can send it directly to your pharmacy.  If lab work is needed we can place an order for that and you can then stop by our lab to have the test done at a later time.    Video visits are billed at different rates depending on your insurance coverage.  Please reach out to your insurance provider with any questions.    If during the course of the call the physician/provider feels a video visit is not appropriate, you will not be charged for this service.\"    Patient has given verbal consent for video visit? Yes    How would you like to obtain your AVS? Mail    Video-Visit Details    Type of service:  Video Visit    Video Start Time:110pm  Video End Time:153pm    Total visit time including video visit, chart review, charting, " coordination of care =57min    Originating Location (pt. Location):patient office      Distant Location (provider location):  home office    Platform used for Video Visit: [employer]    See dictation #[id number]      Please do not hesitate to contact me if you have any questions/concerns.     Sincerely,     Dante Kamara MD

## 2021-03-18 NOTE — RESULT ENCOUNTER NOTE
Results discussed directly with patient during clinic visit. Any further details documented in the progress note.     Venice Garcia LPN

## 2021-03-19 ENCOUNTER — IMMUNIZATION (OUTPATIENT)
Dept: FAMILY MEDICINE | Facility: CLINIC | Age: 66
End: 2021-03-19
Payer: COMMERCIAL

## 2021-03-19 PROCEDURE — 0011A PR COVID VAC MODERNA 100 MCG/0.5 ML IM: CPT

## 2021-03-19 PROCEDURE — 91301 PR COVID VAC MODERNA 100 MCG/0.5 ML IM: CPT

## 2021-03-20 RX ORDER — ASPIRIN 81 MG/1
TABLET, DELAYED RELEASE ORAL
Qty: 90 TABLET | Refills: 3 | OUTPATIENT
Start: 2021-03-20

## 2021-03-20 RX ORDER — ROSUVASTATIN CALCIUM 20 MG/1
TABLET, COATED ORAL
Qty: 90 TABLET | Refills: 3 | OUTPATIENT
Start: 2021-03-20

## 2021-04-16 ENCOUNTER — IMMUNIZATION (OUTPATIENT)
Dept: FAMILY MEDICINE | Facility: CLINIC | Age: 66
End: 2021-04-16
Attending: FAMILY MEDICINE
Payer: COMMERCIAL

## 2021-04-16 PROCEDURE — 91301 PR COVID VAC MODERNA 100 MCG/0.5 ML IM: CPT

## 2021-04-16 PROCEDURE — 0012A PR COVID VAC MODERNA 100 MCG/0.5 ML IM: CPT

## 2021-09-12 ENCOUNTER — HEALTH MAINTENANCE LETTER (OUTPATIENT)
Age: 66
End: 2021-09-12

## 2022-01-02 ENCOUNTER — HEALTH MAINTENANCE LETTER (OUTPATIENT)
Age: 67
End: 2022-01-02

## 2022-02-11 ENCOUNTER — TELEPHONE (OUTPATIENT)
Dept: CARDIOLOGY | Facility: CLINIC | Age: 67
End: 2022-02-11
Payer: COMMERCIAL

## 2022-02-11 NOTE — TELEPHONE ENCOUNTER
Attempted to reach patient to schedule cardiology follow up. Please schedule with Dr. Kamara with fasting labs prior.  Cardio Team

## 2022-03-15 DIAGNOSIS — I25.10 CORONARY ARTERY DISEASE INVOLVING NATIVE CORONARY ARTERY OF NATIVE HEART WITHOUT ANGINA PECTORIS: ICD-10-CM

## 2022-03-15 RX ORDER — ROSUVASTATIN CALCIUM 20 MG/1
20 TABLET, COATED ORAL DAILY
Qty: 90 TABLET | Refills: 3 | Status: CANCELLED | OUTPATIENT
Start: 2022-03-15

## 2022-03-16 ENCOUNTER — MYC MEDICAL ADVICE (OUTPATIENT)
Dept: CARDIOLOGY | Facility: CLINIC | Age: 67
End: 2022-03-16
Payer: COMMERCIAL

## 2022-03-16 DIAGNOSIS — I25.10 CORONARY ARTERY DISEASE INVOLVING NATIVE CORONARY ARTERY OF NATIVE HEART WITHOUT ANGINA PECTORIS: ICD-10-CM

## 2022-03-16 RX ORDER — ROSUVASTATIN CALCIUM 20 MG/1
20 TABLET, COATED ORAL DAILY
Qty: 90 TABLET | Refills: 3 | Status: SHIPPED | OUTPATIENT
Start: 2022-03-16 | End: 2023-03-14

## 2022-03-21 ENCOUNTER — OFFICE VISIT (OUTPATIENT)
Dept: DERMATOLOGY | Facility: CLINIC | Age: 67
End: 2022-03-21
Payer: COMMERCIAL

## 2022-03-21 VITALS — HEART RATE: 50 BPM | SYSTOLIC BLOOD PRESSURE: 137 MMHG | DIASTOLIC BLOOD PRESSURE: 83 MMHG

## 2022-03-21 DIAGNOSIS — L82.0 SEBORRHEIC KERATOSIS, INFLAMED: ICD-10-CM

## 2022-03-21 DIAGNOSIS — I25.10 CORONARY ARTERY DISEASE INVOLVING NATIVE CORONARY ARTERY OF NATIVE HEART WITHOUT ANGINA PECTORIS: ICD-10-CM

## 2022-03-21 DIAGNOSIS — D48.9 NEOPLASM OF UNCERTAIN BEHAVIOR: ICD-10-CM

## 2022-03-21 DIAGNOSIS — L30.9 CHRONIC DERMATITIS OF HANDS: ICD-10-CM

## 2022-03-21 DIAGNOSIS — L73.9 FOLLICULITIS: ICD-10-CM

## 2022-03-21 DIAGNOSIS — C44.619 BCC (BASAL CELL CARCINOMA), ARM, LEFT: ICD-10-CM

## 2022-03-21 DIAGNOSIS — Z85.828 HISTORY OF NONMELANOMA SKIN CANCER: Primary | ICD-10-CM

## 2022-03-21 DIAGNOSIS — L20.89 OTHER ATOPIC DERMATITIS: ICD-10-CM

## 2022-03-21 DIAGNOSIS — L81.4 SOLAR LENTIGO: ICD-10-CM

## 2022-03-21 DIAGNOSIS — D22.9 MULTIPLE BENIGN NEVI: ICD-10-CM

## 2022-03-21 DIAGNOSIS — L82.1 SEBORRHEIC KERATOSIS: ICD-10-CM

## 2022-03-21 PROCEDURE — 17110 DESTRUCTION B9 LES UP TO 14: CPT | Performed by: DERMATOLOGY

## 2022-03-21 PROCEDURE — 99214 OFFICE O/P EST MOD 30 MIN: CPT | Mod: 25 | Performed by: DERMATOLOGY

## 2022-03-21 PROCEDURE — 11102 TANGNTL BX SKIN SINGLE LES: CPT | Mod: XS | Performed by: DERMATOLOGY

## 2022-03-21 PROCEDURE — 88305 TISSUE EXAM BY PATHOLOGIST: CPT | Mod: 26 | Performed by: PATHOLOGY

## 2022-03-21 PROCEDURE — 88305 TISSUE EXAM BY PATHOLOGIST: CPT | Mod: TC | Performed by: DERMATOLOGY

## 2022-03-21 RX ORDER — TRIAMCINOLONE ACETONIDE 1 MG/G
OINTMENT TOPICAL 2 TIMES DAILY
Qty: 30 G | Refills: 3 | Status: CANCELLED | OUTPATIENT
Start: 2022-03-21

## 2022-03-21 RX ORDER — LIDOCAINE HYDROCHLORIDE AND EPINEPHRINE 10; 10 MG/ML; UG/ML
3 INJECTION, SOLUTION INFILTRATION; PERINEURAL ONCE
Status: ACTIVE | OUTPATIENT
Start: 2022-03-21

## 2022-03-21 RX ORDER — TRIAMCINOLONE ACETONIDE 0.25 MG/G
OINTMENT TOPICAL 2 TIMES DAILY
Qty: 80 G | Refills: 1 | Status: SHIPPED | OUTPATIENT
Start: 2022-03-21 | End: 2022-03-23

## 2022-03-21 RX ORDER — TRIAMCINOLONE ACETONIDE 1 MG/G
CREAM TOPICAL 2 TIMES DAILY
Qty: 80 G | Refills: 1 | Status: SHIPPED | OUTPATIENT
Start: 2022-03-21 | End: 2022-03-23

## 2022-03-21 ASSESSMENT — PAIN SCALES - GENERAL: PAINLEVEL: NO PAIN (0)

## 2022-03-21 NOTE — PROGRESS NOTES
[facility] Dermatology Note  Encounter Date: Mar 21, 2022  Office Visit     Dermatology Problem List:  1. Rosacea, no flares for years  - prior tx: metronidazole  2. Eczema  - triamcinolone 0.1% and 0.025% cream  3. Contact dermatitis, secondary to poison ivy exposure.   4. History BCC 2013, left forearm s/p ED&C  5. NUB, left lateral elbow, s/p shave bx 3/21/2022     ____________________________________________    Assessment & Plan:    # NUB, left lateral elbow, biopsy ddx r/o BCC  Features on exam suggestive of basal cell carcinoma, concerning given clinical presentation as well as personal history of BCC.  - Shave biopsy today, see procedure below    # Irritated seborrheic keratosis, right lower back  - Cryo today, see procedure below    # Erythematous right eye, trauma vs infectious  Lesion is improving, but is interesting that has persisted for at least a month.  He suspects that there was a vesicular component initially, which is resolved.  It is also possible secondary to trauma.  - Monitor for resolution   - Apply triamcinolone 0.025% twice a day for 1 week.  - Photo documentation today.  - Patient to document with photos weekly, until resolution.    # Hand dermatitis  We discussed treatment options, and patient prefers cream rather than ointment.  He does have a history of atopy, and would benefit from further allergy testing to understand the best treatments for him as well as exacerbating factors.  - Continue triamcinolone 0.1% cream as needed to affected areas BID prn for flares  - Start triamcinolone 0.025% cream for maintenance  - Referral to Dr. Bradford for allergy testing    #Scattered acneiform papules on trunk.  Differential diagnoses Grovers vs folliculitis vs other.  Currently is not very bothersome for the patient, aside from intermittent itchiness.  - OK to trial 2% ketoconazole shampoo as a body wash alternating with a benzoyl peroxide wash - to rotate every other day and  "only to use if medications do not irritate the skin. UPDATED RECOMMENDATION MADE ON 4.3.22     # Benign lesions: Multiple benign nevi, solar lentigos, seborrheic keratoses. Explained to patient benign nature of lesion. No treatment is necessary at this time unless the lesion changes or becomes symptomatic.  - ABCDs of melanoma were discussed and self skin checks were advised.  - Sun precaution was advised including the use of sun screens of SPF 30 or higher, sun protective clothing, and avoidance of tanning beds.    #Rosacea, mild-moderate  Patient does have rosacea on exam, without occular involvement. Has not treated for many years as it \"has not flared\" like it has in the past. Not interested in therapies at this time.  - Continue to monitor.    # History of non-melanoma skin cancer   Without evidence of recurrence at previous surgical site.  - Continue regular skin exams     Procedures Performed:   - Shave biopsy procedure note, location(s): see above. After discussion of benefits and risks including but not limited to bleeding, infection, scar, incomplete removal, recurrence, and non-diagnostic biopsy, written consent and photographs were obtained. The area was cleaned with isopropyl alcohol. 0.5mL of 1% lidocaine with epinephrine was injected to obtain adequate anesthesia of lesion(s). Shave biopsy at site(s) performed. Hemostasis was achieved with aluminium chloride. Petrolatum ointment and a sterile dressing were applied. The patient tolerated the procedure and no complications were noted. The patient was provided with verbal and written post care instructions.     - Cryotherapy procedure note, location(s): see above. After verbal consent and discussion of risks and benefits including, but not limited to, dyspigmentation/scar, blister, and pain, 1 lesion(s) was(were) treated with 1-2 mm freeze border for 1-2 cycles with liquid nitrogen. Post cryotherapy instructions were provided.     Follow-up: 3-4 month(s) " in-person, or earlier for new or changing lesions    Staff and Scribe:     Scribe Disclosure:  I, Perez Meyer, am serving as a scribe to document services personally performed by Va Morales MD based on data collection and the provider's statements to me.     Lacey Goins MS4    Provider Disclosure:   The documentation recorded by the scribe accurately reflects the services I personally performed and the decisions made by me.    Staff Physician:  I was present with the medical student who participated in the service and in the documentation of the note. I have verified the history and personally performed the physical exam and medical decision making. I agree with the assessment and plan of care as documented in the note.  I was present for the key portion of the biopsy procedure which was also done with the dermatology resident.     Va Morales MD  Professor and Chair  Department of Dermatology  [facility]: Phone: [phone], Fax:[phone]  [facility]: Phone: [phone], Fax: [phone]        ____________________________________________    CC: Skin Check (full body skin check, spot on back.)    HPI:  Mr. Evan Gregg is a(n) 66 year old male who presents today as a return patient for FBSE. Last seen in dermatology by Dr. Avila on 7/29/2020, at which time patient was continued on TMC 0.1% for treatment of eczematous dermatitis.    Today, patient reports a spot on his back that he would like examined. It has grown over the last month. Denies associated itch, burning, bleeding. Denies regular SPF usage. Finds sunscreen irritating due to eczema. Additionally, he reports that he has not used metrogel for his rosacea approximately 5 years ago. When prompted, patient reports that pink papules on skin have been present for approximately 2 years, intermittently itchy.  Denies associated tenderness or experiencing night sweats or unintended weight loss.    Patient is otherwise feeling well, without additional skin concerns.    Labs Reviewed:  N/A    Physical Exam:  Vitals: /83   Pulse 50   SKIN: Total skin excluding the undergarment areas was performed. The exam included the head/face, neck, both arms, chest, back, abdomen, both legs, digits and/or nails.   - Pink acneiform papules scattered on trunk and back.  - Crusted papule, well circumscribed and erythematous with adherent scale on right lower posterior back.  - Right eye with an erythematous smooth lesion  - Left lateral elbow with erythematous patch with telangiectasias, bright pink and with irregular borders and well circumscribed.  - Multiple regular brown pigmented macules and papules are identified on the trunk and extremities, consistent with benign nevi.   - Scattered brown macules on sun exposed areas.  - There are waxy stuck on tan to brown to skin-colored papules on the trunk and extremities.   - Few light brown to tan macules with regular pigmental distribution.  - Left posterior calf, erythematous patch with some telangiectasias.  - No other lesions of concern on areas examined.     Medications:  Current Outpatient Medications   Medication     aspirin (ASA) 81 MG EC tablet     metroNIDAZOLE (METROGEL) 0.75 % gel     rosuvastatin (CRESTOR) 20 MG tablet     triamcinolone (KENALOG) 0.025 % cream     ibuprofen (ADVIL/MOTRIN) 800 MG tablet     traZODone (DESYREL) 50 MG tablet     No current facility-administered medications for this visit.      Past Medical History:   Patient Active Problem List   Diagnosis     Rosacea     CARDIOVASCULAR SCREENING; LDL GOAL LESS THAN 160     Plantar wart of left foot     BCC (basal cell carcinoma), arm     Osteoarthritis of hip     Shoulder pain, right     Right elbow pain     Pulmonary nodules     Past Medical History:   Diagnosis Date     Anxiety 7/23/2019     Arthritis  3/25/2014     Depressive disorder 7/23/2019     Hearing problem 7/23/2019     Reduced vision 7/23/2019        CC Referred Edmar, MD  No address on file on close of this encounter.

## 2022-03-21 NOTE — LETTER
3/21/2022       RE: Evan Gregg  [address] [facility] [address]     Dear Colleague,    Thank you for referring your patient, Evan Gregg, to the [facility] [place] at [facility]. Please see a copy of my visit note below.    [facility] Dermatology Note  Encounter Date: Mar 21, 2022  Office Visit     Dermatology Problem List:  1. Rosacea, no flares for years  - prior tx: metronidazole  2. Eczema  - triamcinolone 0.1% and 0.025% cream  3. Contact dermatitis, secondary to poison ivy exposure.   4. History BCC 2013, left forearm s/p ED&C  5. NUB, left lateral elbow, s/p shave bx 3/21/2022     ____________________________________________    Assessment & Plan:    # NUB, left lateral elbow, biopsy ddx r/o BCC  Features on exam suggestive of basal cell carcinoma, concerning given clinical presentation as well as personal history of BCC.  - Shave biopsy today, see procedure below    # Irritated seborrheic keratosis, right lower back  - Cryo today, see procedure below    # Erythematous right eye, trauma vs infectious  Lesion is improving, but is interesting that has persisted for at least a month.  He suspects that there was a vesicular component initially, which is resolved.  It is also possible secondary to trauma.  - Monitor for resolution   - Apply triamcinolone 0.025% twice a day for 1 week.  - Photo documentation today.  - Patient to document with photos weekly, until resolution.    # Hand dermatitis  We discussed treatment options, and patient prefers cream rather than ointment.  He does have a history of atopy, and would benefit from further allergy testing to understand the best treatments for him as well as exacerbating factors.  - Continue triamcinolone 0.1% cream as needed to affected areas BID prn for flares  - Start triamcinolone 0.025% cream for maintenance  - Referral to Dr. Bradford for  "allergy testing    #Scattered acneiform papules on trunk.  Differential diagnoses Grovers vs folliculitis vs other.  Currently is not very bothersome for the patient, aside from intermittent itchiness.  - OK to trial 2% ketoconazole shampoo as a body wash alternating with a benzoyl peroxide wash - to rotate every other day and only to use if medications do not irritate the skin. UPDATED RECOMMENDATION MADE ON 4.3.22     # Benign lesions: Multiple benign nevi, solar lentigos, seborrheic keratoses. Explained to patient benign nature of lesion. No treatment is necessary at this time unless the lesion changes or becomes symptomatic.  - ABCDs of melanoma were discussed and self skin checks were advised.  - Sun precaution was advised including the use of sun screens of SPF 30 or higher, sun protective clothing, and avoidance of tanning beds.    #Rosacea, mild-moderate  Patient does have rosacea on exam, without occular involvement. Has not treated for many years as it \"has not flared\" like it has in the past. Not interested in therapies at this time.  - Continue to monitor.    # History of non-melanoma skin cancer   Without evidence of recurrence at previous surgical site.  - Continue regular skin exams     Procedures Performed:   - Shave biopsy procedure note, location(s): see above. After discussion of benefits and risks including but not limited to bleeding, infection, scar, incomplete removal, recurrence, and non-diagnostic biopsy, written consent and photographs were obtained. The area was cleaned with isopropyl alcohol. 0.5mL of 1% lidocaine with epinephrine was injected to obtain adequate anesthesia of lesion(s). Shave biopsy at site(s) performed. Hemostasis was achieved with aluminium chloride. Petrolatum ointment and a sterile dressing were applied. The patient tolerated the procedure and no complications were noted. The patient was provided with verbal and written post care instructions.     - Cryotherapy " procedure note, location(s): see above. After verbal consent and discussion of risks and benefits including, but not limited to, dyspigmentation/scar, blister, and pain, 1 lesion(s) was(were) treated with 1-2 mm freeze border for 1-2 cycles with liquid nitrogen. Post cryotherapy instructions were provided.     Follow-up: 3-4 month(s) in-person, or earlier for new or changing lesions    Staff and Scribe:     Scribe Disclosure:  I, Perez Meyer, am serving as a scribe to document services personally performed by Va Morales MD based on data collection and the provider's statements to me.     Lacey Goins MS4    Provider Disclosure:   The documentation recorded by the scribe accurately reflects the services I personally performed and the decisions made by me.    Staff Physician:  I was present with the medical student who participated in the service and in the documentation of the note. I have verified the history and personally performed the physical exam and medical decision making. I agree with the assessment and plan of care as documented in the note.  I was present for the key portion of the biopsy procedure which was also done with the dermatology resident.     Va Morales MD  Professor and Chair  Department of Dermatology  [facility]: Phone: [phone], Fax:[phone]  [facility]: Phone: [phone], Fax: [phone]        ____________________________________________    CC: Skin Check (full body skin check, spot on back.)    HPI:  Mr. Evan Gregg is a(n) 66 year old male who presents today as a return patient for FBSE. Last seen in dermatology by Dr. Avila on 7/29/2020, at which time patient was continued on TMC 0.1% for treatment of eczematous dermatitis.    Today, patient reports a spot on his back that he would like examined. It has grown over the last  month. Denies associated itch, burning, bleeding. Denies regular SPF usage. Finds sunscreen irritating due to eczema. Additionally, he reports that he has not used metrogel for his rosacea approximately 5 years ago. When prompted, patient reports that pink papules on skin have been present for approximately 2 years, intermittently itchy. Denies associated tenderness or experiencing night sweats or unintended weight loss.    Patient is otherwise feeling well, without additional skin concerns.    Labs Reviewed:  N/A    Physical Exam:  Vitals: /83   Pulse 50   SKIN: Total skin excluding the undergarment areas was performed. The exam included the head/face, neck, both arms, chest, back, abdomen, both legs, digits and/or nails.   - Pink acneiform papules scattered on trunk and back.  - Crusted papule, well circumscribed and erythematous with adherent scale on right lower posterior back.  - Right eye with an erythematous smooth lesion  - Left lateral elbow with erythematous patch with telangiectasias, bright pink and with irregular borders and well circumscribed.  - Multiple regular brown pigmented macules and papules are identified on the trunk and extremities, consistent with benign nevi.   - Scattered brown macules on sun exposed areas.  - There are waxy stuck on tan to brown to skin-colored papules on the trunk and extremities.   - Few light brown to tan macules with regular pigmental distribution.  - Left posterior calf, erythematous patch with some telangiectasias.  - No other lesions of concern on areas examined.     Medications:  Current Outpatient Medications   Medication     aspirin (ASA) 81 MG EC tablet     metroNIDAZOLE (METROGEL) 0.75 % gel     rosuvastatin (CRESTOR) 20 MG tablet     triamcinolone (KENALOG) 0.025 % cream     ibuprofen (ADVIL/MOTRIN) 800 MG tablet     traZODone (DESYREL) 50 MG tablet     No current facility-administered medications for this visit.      Past Medical History:   Patient  Active Problem List   Diagnosis     Rosacea     CARDIOVASCULAR SCREENING; LDL GOAL LESS THAN 160     Plantar wart of left foot     BCC (basal cell carcinoma), arm     Osteoarthritis of hip     Shoulder pain, right     Right elbow pain     Pulmonary nodules     Past Medical History:   Diagnosis Date     Anxiety 7/23/2019     Arthritis 3/25/2014     Depressive disorder 7/23/2019     Hearing problem 7/23/2019     Reduced vision 7/23/2019        CC Referred Self, MD  No address on file on close of this encounter.      Again, thank you for allowing me to participate in the care of your patient.      Sincerely,    Va Morales MD

## 2022-03-21 NOTE — PATIENT INSTRUCTIONS
"    Gentle Skin Care    1. Bathing tips    Daily bathing is recommended. Baths are better than showers as soaking can help hydrate the skin.    Avoid super hot water. Although it feels good with the cold weather, hot water can dry out the skin.    Use soap sparingly. Believe it or not, we only really need soap to wash the armpits and groin, or other areas that are visibly dirty.     Use gentle soap products such as Dove bar soap for sensitive skin. Vanicream, CeraVe and Cetaphil are also great brands for sensitive skin.    Do not vigorously scrub your skin during cleansing.     After bathing, pat your skin lightly with a towel. Do not rub or scrub when drying.     2. Moisturizing tips    Always apply a moisturizer immediately to the entire body after bathing. This helps to \"lock in\" the moisture. Moisturizer can be applied daily if necessary.     Moisturizers containing ceramides are better. Ceramides are natural lipids that our skin needs to stay healthy. We recommend Vanicream, CeraVe, Cetaphil Restoraderm, Eucerin Professional Repair or Curel Intensive Healing Cream.    Adding in a humidifier at night can be soothing.     3. Lifestyle tips    Avoid products such as powders, perfumes or colognes on your skin. These can cause further irritation in sensitive skin.    Avoid saunas and steam baths. The temperature is too hot.     Use unscented hypo-allergenic laundry products.    Avoid tight or \"scratchy\" clothing such as wool, as they can cause irritation and itching.    Below is a list of products our providers recommend for gentle skin care.  Moisturizers:    Lighter; Cetaphil Cream, CeraVe, Aveeno and Vanicream Light     Thicker; Aquaphor Ointment, Vaseline, Petrolium Jelly, Eucerin and Vanicream    Avoid Lotions (too thin)  Mild Cleansers:    Dove- Fragrance Free    CeraVe     Vanicream Cleansing Bar    Cetaphil Cleanser     Aquaphor 2 in1 Gentle Wash and Shampoo       Laundry Products:    All Free and " Clear    Cheer Free    Generic Brands are okay as long as they are  Fragrance Free      Avoid fabric softeners  and dryer sheets   Sunscreens: SPF 30 or greater     Sunscreens that contain Zinc Oxide or Titanium Dioxide should be applied, these are physical blockers. Spray or  chemical  sunscreens should be avoided.        Shampoo and Conditioners:    Free and Clear by Vanicream    Aquaphor 2 in 1 Gentle Wash and Shampoo    California Baby  super sensitive   Oils:    Mineral Oil     Emu Oil     For some patients, coconut and sunflower seed oil      Patient Education     Checking for Skin Cancer  You can find cancer early by checking your skin each month. There are 3 kinds of skin cancer. They are melanoma, basal cell carcinoma, and squamous cell carcinoma. Doing monthly skin checks is the best way to find new marks or skin changes. Follow the instructions below for checking your skin.   The ABCDEs of checking moles for melanoma   Check your moles or growths for signs of melanoma using ABCDE:     Asymmetry: the sides of the mole or growth don t match    Border: the edges are ragged, notched, or blurred    Color: the color within the mole or growth varies    Diameter: the mole or growth is larger than 6 mm (size of a pencil eraser)    Evolving: the size, shape, or color of the mole or growth is changing (evolving is not shown in the images below)    Checking for other types of skin cancer  Basal cell carcinoma or squamous cell carcinoma have symptoms such as:       A spot or mole that looks different from all other marks on your skin    Changes in how an area feels, such as itching, tenderness, or pain    Changes in the skin's surface, such as oozing, bleeding, or scaliness    A sore that does not heal    New swelling or redness beyond the border of a mole    Who s at risk?  Anyone can get skin cancer. But you are at greater risk if you have:     Fair skin, light-colored hair, or light-colored eyes    Many moles or  abnormal moles on your skin    A history of sunburns from sunlight or tanning beds    A family history of skin cancer    A history of exposure to radiation or chemicals    A weakened immune system  If you have had skin cancer in the past, you are at risk for recurring skin cancer.   How to check your skin  Do your monthly skin checkups in front of a full-length mirror. Check all parts of your body, including your:     Head (ears, face, neck, and scalp)    Torso (front, back, and sides)    Arms (tops, undersides, upper, and lower armpits)    Hands (palms, backs, and fingers, including under the nails)    Buttocks and genitals    Legs (front, back, and sides)    Feet (tops, soles, toes, including under the nails, and between toes)  If you have a lot of moles, take digital photos of them each month. Make sure to take photos both up close and from a distance. These can help you see if any moles change over time.   Most skin changes are not cancer. But if you see any changes in your skin, call your doctor right away. Only he or she can diagnose a problem. If you have skin cancer, seeing your doctor can be the first step toward getting the treatment that could save your life.   OMG last reviewed this educational content on 4/1/2019 2000-2020 [employer]. [address] [place] [address]. All rights reserved. This information is not intended as a substitute for professional medical care. Always follow your healthcare professional's instructions.       When should I call my doctor?    If you are worsening or not improving, please, contact us or seek urgent care as noted below.     Who should I call with questions (adults)?    [facility] (adult and pediatric): [phone]    [facility] (adult): [phone]    For urgent needs outside of business hours call the [facility] at [phone] and ask for the dermatology resident  on call to be paged    If this is a medical emergency and you are unable to reach an ER, Call [phone]    Who should I call with questions (pediatric)?  [facility]- Pediatric Dermatology  Dr. Eve Gaspar, Dr. Lizbeth Diaz, Dr. Shawanda Small, HETAL Ferrell, Dr. Desire Riojas, Dr. Va Morales & Dr. Levar Fink  Non-urgent nurse triage line; [phone]- Nereyda and Gloria RN Care Coordinators   Luzma (/Complex ) [phone]    If you need a prescription refill, please contact your pharmacy. Refills are approved or denied by our Physicians during normal business hours, Monday through Fridays  Per office policy, refills will not be granted if you have not been seen within the past year (or sooner depending on your child's condition)    Scheduling Information:  Pediatric Appointment Scheduling and Call Center [phone]  Radiology Scheduling- [phone]  Sedation Unit Scheduling- [phone]  [facility] Scheduling- General [phone]; Pediatric Dermatology [phone]  Main  Services: [phone]  Tristanian: [phone]  Bolivian: [phone]  Hmong/Aglen/Icelandic: [phone]  Preadmission Nursing Department Fax Number: [phone] (Fax all pre-operative paperwork to this number)    For urgent matters arising during evenings, weekends, or holidays that cannot wait for normal business hours please call [phone] and ask for the dermatology resident on call to be paged.      Wound Care After a Biopsy    What is a skin biopsy?  A skin biopsy allows the doctor to examine a very small piece of tissue under the microscope to determine the diagnosis and the best treatment for the skin condition. A local anesthetic (numbing medicine)  is injected with a very small needle into the skin area to be tested. A small piece of skin is taken from the area. Sometimes a suture (stitch) is used.     What are the risks of a skin biopsy?  I will  experience scar, bleeding, swelling, pain, crusting and redness. I may experience incomplete removal or recurrence. Risks of this procedure are excessive bleeding, bruising, infection, nerve damage, numbness, thick (hypertrophic or keloidal) scar and non-diagnostic biopsy.    How should I care for my wound for the first 24 hours?    Keep the wound dry and covered for 24 hours    If it bleeds, hold direct pressure on the area for 15 minutes. If bleeding does not stop then go to the emergency room    Avoid strenuous exercise the first 1-2 days or as your doctor instructs you    How should I care for the wound after 24 hours?    After 24 hours, remove the bandage    You may bathe or shower as normal    If you had a scalp biopsy, you can shampoo as usual and can use shower water to clean the biopsy site daily    Clean the wound twice a day with gentle soap and water    Do not scrub, be gentle    Apply white petroleum/Vaseline after cleaning the wound with a cotton swab or a clean finger, and keep the site covered with a Bandaid /bandage. Bandages are not necessary with a scalp biopsy    If you are unable to cover the site with a Bandaid /bandage, re-apply ointment 2-3 times a day to keep the site moist. Moisture will help with healing    Avoid strenuous activity for first 1-2 days    Avoid lakes, rivers, pools, and oceans until the stitches are removed or the site is healed    How do I clean my wound?    Wash hands thoroughly with soap or use hand  before all wound care    Clean the wound with gentle soap and water    Apply white petroleum/Vaseline  to wound after it is clean    Replace the Bandaid /bandage to keep the wound covered for the first few days or as instructed by your doctor    If you had a scalp biopsy, warm shower water to the area on a daily basis should suffice    What should I use to clean my wound?     Cotton-tipped applicators (Qtips )    White petroleum jelly (Vaseline ). Use a clean new  container and use Q-tips to apply.    Bandaids   as needed    Gentle soap     How should I care for my wound long term?    Do not get your wound dirty    Keep up with wound care for one week or until the area is healed.    A small scab will form and fall off by itself when the area is completely healed. The area will be red and will become pink in color as it heals. Sun protection is very important for how your scar will turn out. Sunscreen with an SPF 30 or greater is recommended once the area is healed.    You should have some soreness but it should be mild and slowly go away over several days. Talk to your doctor about using tylenol for pain,    When should I call my doctor?  If you have increased:     Pain or swelling    Pus or drainage (clear or slightly yellow drainage is ok)    Temperature over 100F    Spreading redness or warmth around wound    When will I hear about my results?  The biopsy results can take 2 weeks to come back.  Your results will automatically release to [employer] before your provider has even reviewed them.  The clinic will call you with the results, send you a [employer] message, or have you schedule a follow-up clinic or phone time to discuss the results.  Contact our clinics if you do not hear from us in 2 weeks.    Who should I call with questions?    [facility]: [phone]    [facility]: [phone]    For urgent needs outside of business hours call the [facility] at [phone] and ask for the dermatology resident on call    Cryotherapy    What is it?    Use of a very cold liquid, such as liquid nitrogen, to freeze and destroy abnormal skin cells that need to be removed    What should I expect?    Tenderness and redness    A small blister that might grow and fill with dark purple blood. There may be crusting.    More than one treatment may be needed if the lesions do not go away.    How do I care for the treated  area?    Gently wash the area with your hands when bathing.    Use a thin layer of Vaseline to help with healing. You may use a Band-Aid.     The area should heal within 7-10 days and may leave behind a pink or lighter color.     Do not use an antibiotic or Neosporin ointment.     You may take acetaminophen (Tylenol) for pain.     Call your doctor if you have:    Severe pain    Signs of infection (warmth, redness, cloudy yellow drainage, and or a bad smell)    Questions or concerns    Who should I call with questions?       [facility]: [phone]       [facility]: [phone]       For urgent needs outside of business hours call the [facility] at [phone] and ask for the dermatology resident on call

## 2022-03-23 DIAGNOSIS — L30.9 CHRONIC DERMATITIS OF HANDS: ICD-10-CM

## 2022-03-23 LAB
PATH REPORT.COMMENTS IMP SPEC: NORMAL
PATH REPORT.COMMENTS IMP SPEC: NORMAL
PATH REPORT.FINAL DX SPEC: NORMAL
PATH REPORT.GROSS SPEC: NORMAL
PATH REPORT.MICROSCOPIC SPEC OTHER STN: NORMAL
PATH REPORT.RELEVANT HX SPEC: NORMAL

## 2022-03-23 RX ORDER — TRIAMCINOLONE ACETONIDE 0.25 MG/G
CREAM TOPICAL 2 TIMES DAILY
Qty: 454 G | Refills: 1 | Status: SHIPPED | OUTPATIENT
Start: 2022-03-23

## 2022-03-23 RX ORDER — TRIAMCINOLONE ACETONIDE 1 MG/G
CREAM TOPICAL 2 TIMES DAILY
Qty: 454 G | Refills: 1 | Status: SHIPPED | OUTPATIENT
Start: 2022-03-23

## 2022-03-23 RX ORDER — TRIAMCINOLONE ACETONIDE 0.25 MG/G
OINTMENT TOPICAL 2 TIMES DAILY
Qty: 454 G | Refills: 1 | Status: SHIPPED | OUTPATIENT
Start: 2022-03-23 | End: 2022-03-23

## 2022-03-24 NOTE — RESULT ENCOUNTER NOTE
Reviewed results showing smfBCC. Discussed these results with patient over MyChart on 3/24/22. Will place referral for excision of area    CC'ing Dr. Morales as BEREI

## 2022-03-25 DIAGNOSIS — Z12.5 SCREENING FOR PROSTATE CANCER: Primary | ICD-10-CM

## 2022-03-26 ENCOUNTER — LAB (OUTPATIENT)
Dept: LAB | Facility: CLINIC | Age: 67
End: 2022-03-26
Payer: COMMERCIAL

## 2022-03-26 DIAGNOSIS — Z12.5 SCREENING FOR PROSTATE CANCER: ICD-10-CM

## 2022-03-26 DIAGNOSIS — E78.5 DYSLIPIDEMIA: ICD-10-CM

## 2022-03-26 DIAGNOSIS — I25.10 CORONARY ARTERY DISEASE INVOLVING NATIVE CORONARY ARTERY OF NATIVE HEART WITHOUT ANGINA PECTORIS: ICD-10-CM

## 2022-03-26 LAB
ANION GAP SERPL CALCULATED.3IONS-SCNC: 5 MMOL/L (ref 3–14)
BUN SERPL-MCNC: 10 MG/DL (ref 7–30)
CALCIUM SERPL-MCNC: 8.5 MG/DL (ref 8.5–10.1)
CHLORIDE BLD-SCNC: 103 MMOL/L (ref 94–109)
CHOLEST SERPL-MCNC: 125 MG/DL
CO2 SERPL-SCNC: 26 MMOL/L (ref 20–32)
CREAT SERPL-MCNC: 0.71 MG/DL (ref 0.66–1.25)
ERYTHROCYTE [DISTWIDTH] IN BLOOD BY AUTOMATED COUNT: 11.8 % (ref 10–15)
FASTING STATUS PATIENT QL REPORTED: YES
GFR SERPL CREATININE-BSD FRML MDRD: >90 ML/MIN/1.73M2
GLUCOSE BLD-MCNC: 107 MG/DL (ref 70–99)
HCT VFR BLD AUTO: 47.6 % (ref 40–53)
HDLC SERPL-MCNC: 45 MG/DL
HGB BLD-MCNC: 16 G/DL (ref 13.3–17.7)
LDLC SERPL CALC-MCNC: 65 MG/DL
MCH RBC QN AUTO: 31.7 PG (ref 26.5–33)
MCHC RBC AUTO-ENTMCNC: 33.6 G/DL (ref 31.5–36.5)
MCV RBC AUTO: 94 FL (ref 78–100)
NONHDLC SERPL-MCNC: 80 MG/DL
PLATELET # BLD AUTO: 190 10E3/UL (ref 150–450)
POTASSIUM BLD-SCNC: 3.9 MMOL/L (ref 3.4–5.3)
PSA SERPL-MCNC: 0.94 UG/L (ref 0–4)
RBC # BLD AUTO: 5.05 10E6/UL (ref 4.4–5.9)
SODIUM SERPL-SCNC: 134 MMOL/L (ref 133–144)
T4 FREE SERPL-MCNC: 0.86 NG/DL (ref 0.76–1.46)
TRIGL SERPL-MCNC: 76 MG/DL
TSH SERPL DL<=0.005 MIU/L-ACNC: 4.96 MU/L (ref 0.4–4)
WBC # BLD AUTO: 4.7 10E3/UL (ref 4–11)

## 2022-03-26 PROCEDURE — 84439 ASSAY OF FREE THYROXINE: CPT

## 2022-03-26 PROCEDURE — 80048 BASIC METABOLIC PNL TOTAL CA: CPT

## 2022-03-26 PROCEDURE — 84443 ASSAY THYROID STIM HORMONE: CPT

## 2022-03-26 PROCEDURE — 36415 COLL VENOUS BLD VENIPUNCTURE: CPT

## 2022-03-26 PROCEDURE — 85027 COMPLETE CBC AUTOMATED: CPT

## 2022-03-26 PROCEDURE — 80061 LIPID PANEL: CPT

## 2022-03-26 PROCEDURE — G0103 PSA SCREENING: HCPCS

## 2022-03-26 NOTE — TELEPHONE ENCOUNTER
FUTURE VISIT INFORMATION      FUTURE VISIT INFORMATION:    Date: 4.19.22    Time: 2:00    Location: Telephone  REFERRAL INFORMATION:    Referring provider:  Dr. Morales    Referring providers clinic:  [facility]    Reason for visit/diagnosis  excision of BCC on left arm [phone]    RECORDS REQUESTED FROM:       Clinic name Comments Records Status Photos Status   [facility] 3.21.22  Path # [id number] [facility]

## 2022-04-01 NOTE — TELEPHONE ENCOUNTER
FUTURE VISIT INFORMATION      FUTURE VISIT INFORMATION:    Date: 7.8.22    Time: 8:00    Location: [facility]  REFERRAL INFORMATION:    Referring provider:  Dr. Morales    Referring providers clinic:  [facility]    Reason for visit/diagnosis  Other atopic dermatitis, Patch Testing, Recs in Epic, per Pt John     RECORDS REQUESTED FROM:       Clinic name Comments Records Status   [facility] 3.21.22  Dr. Morales    6.29.20  Dr. Avila [facility]

## 2022-04-03 RX ORDER — KETOCONAZOLE 20 MG/ML
SHAMPOO TOPICAL
Qty: 120 ML | Refills: 5 | Status: SHIPPED | OUTPATIENT
Start: 2022-04-03 | End: 2023-05-15

## 2022-04-06 ENCOUNTER — VIRTUAL VISIT (OUTPATIENT)
Dept: CARDIOLOGY | Facility: CLINIC | Age: 67
End: 2022-04-06
Attending: INTERNAL MEDICINE
Payer: COMMERCIAL

## 2022-04-06 DIAGNOSIS — R07.9 CHEST PAIN, UNSPECIFIED TYPE: ICD-10-CM

## 2022-04-06 DIAGNOSIS — R00.2 PALPITATIONS: ICD-10-CM

## 2022-04-06 DIAGNOSIS — R06.09 DOE (DYSPNEA ON EXERTION): Primary | ICD-10-CM

## 2022-04-06 DIAGNOSIS — G47.10 HYPERSOMNOLENCE: ICD-10-CM

## 2022-04-06 PROCEDURE — 99215 OFFICE O/P EST HI 40 MIN: CPT | Mod: 95 | Performed by: INTERNAL MEDICINE

## 2022-04-06 NOTE — LETTER
"4/6/2022      RE: Evan Gregg  [address] [place] [address]       Dear Colleague,    Thank you for the opportunity to participate in the care of your patient, Evan Gregg, at the [facility] [place] [facility]. Please see a copy of my visit note below.    Evan Gregg  is being evaluated via a billable video visit.      How would you like to obtain your AVS? MyChart  For the video visit, send the invitation by: Send to e-mail at: [email or web address]  Will anyone else be joining your video visit? No   GENE Noland/PARVEEN    The patient has been notified of following:     \"This phone visit will be conducted via a call between you and your physician/provider. We have found that certain health care needs can be provided without the need for an in-person physical exam.  This service lets us provide the care you need with a phone conversation.  If a prescription is necessary we can send it directly to your pharmacy.  If lab work is needed we can place an order for that and you can then stop by our lab to have the test done at a later time.    Phone visits are billed at different rates depending on your insurance coverage.  Please reach out to your insurance provider with any questions.    If during the course of the call the physician/provider feels a phone visit is not appropriate, you will not be charged for this service.\"    Patient has given verbal consent for phone visit? Yes    How would you like to obtain your AVS? Keiryhart    Duration of call:24min    Total visit time (including visit time, charting, chart review, coordination of care, etc.=45min    See dictation #[id number]    CSN#:[id number]                        Please do not hesitate to contact me if you have any questions/concerns.     Sincerely,     Dante Kamara MD    "

## 2022-04-06 NOTE — PATIENT INSTRUCTIONS
1.  Echocardiogram (ultrasound of heart)    2.  Lexiscan stress test (see below)    3.  14 day ZioPatch monitor hook up     Scheduling : [phone]    As soon as results are compiled and reviewed, you will be notified.     Referral placed to Sleep Clinic to evaluate for hypersomnolence.  They will call you to schedule.     Lexiscan (nuclear stress test)     Why do I need this test?  Your doctor has ordered a nuclear stress test to check how well blood is flowing through your heart. You will either exercise or take a medicine that mimics exercise; we will watch your heart. Please allow 2 to 4 hours for this test.      What happens during this test?      1. We will place an IV (small needle) in the vein of your arm or hand.  2. We will inject a liquid through the IV. The liquid contains radioactivity. This helps your heart show up better on the pictures we will take.  3. About 30 to 60 minutes later, you will lie down on a table. A special camera will rotate around your chest taking pictures of your heart. This lasts less than 30 minutes.  4. To prepare for the stress test, we will check your blood pressure. We will also attach small pads to your chest. The pads are hooked to an EKG (electrocardiogram) machine. The machine shows how your heart is working during the test.  5. You will begin the stress test.    If you can exercise, you will walk on a treadmill for 5 to 15 minutes. You will start at a slow speed. We will slowly increase the speed and level of incline.    If you cannot exercise, we will give you medicine to mimic the effects of exercise. The medicine goes through the IV slowly.  6. During the stress test, we will again inject liquid through your IV. (See Step 2.)  7. After the stress test, you will wait 30 to 60 minutes. We will then take more pictures of your heart.    Preparation :    NO FOOD 3 hours prior, Water is ok and encouraged  NO alcohol, smoking, caffeine, or decaf products 12 hours  prior    TAKE ALL medications as prescribed, hold the following medications if they pertain to you:  If you take Aggrenox or dipyridamole (Persantine, Permole), stop taking it 48 hours before your test.  If you take Viagra, Cialis or Levitra, stop taking it 48 hours before your test.  If you take theophylline or aminophylline, stop taking it 12 hours before your test.  For patients with diabetes:If you take insulin, call your diabetes care team. Ask if you should take a 1/2 dose the morning of your test  If you take diabetes medicine by mouth, don't take it on the morning of your test. Bring it with you to take after the test. (If you have questions, call your diabetes care team.)  Do not take nitrates on the day of your test. Do not wear a Nitro-Patch.

## 2022-04-06 NOTE — PROGRESS NOTES
"Evan Gregg  is being evaluated via a billable video visit.      How would you like to obtain your AVS? MyChart  For the video visit, send the invitation by: Send to e-mail at: [email or web address]  Will anyone else be joining your video visit? GENE Andres/PARVEEN    The patient has been notified of following:     \"This phone visit will be conducted via a call between you and your physician/provider. We have found that certain health care needs can be provided without the need for an in-person physical exam.  This service lets us provide the care you need with a phone conversation.  If a prescription is necessary we can send it directly to your pharmacy.  If lab work is needed we can place an order for that and you can then stop by our lab to have the test done at a later time.    Phone visits are billed at different rates depending on your insurance coverage.  Please reach out to your insurance provider with any questions.    If during the course of the call the physician/provider feels a phone visit is not appropriate, you will not be charged for this service.\"    Patient has given verbal consent for phone visit? Yes    How would you like to obtain your AVS? MyChart    Duration of call:24min    Total visit time (including visit time, charting, chart review, coordination of care, etc.=45min    See dictation #[id number]    CSN#:[id number]                    "
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
## 2022-04-06 NOTE — NURSING NOTE
Chief Complaint   Patient presents with     Follow Up     CAD, no vitals to report for todays visit, pt states he wishes provider would do in person visit, there are a few medications flagged for removal      Patient denies any changes regarding medication and allergies and states all information remains accurate since last reviewed.    Max Baires, VF/CMA

## 2022-04-08 ENCOUNTER — ANCILLARY PROCEDURE (OUTPATIENT)
Dept: CARDIOLOGY | Facility: CLINIC | Age: 67
End: 2022-04-08
Attending: INTERNAL MEDICINE
Payer: COMMERCIAL

## 2022-04-08 DIAGNOSIS — R00.2 PALPITATIONS: ICD-10-CM

## 2022-04-08 PROCEDURE — 93248 EXT ECG>7D<15D REV&INTERPJ: CPT | Performed by: INTERNAL MEDICINE

## 2022-04-08 PROCEDURE — 93246 EXT ECG>7D<15D RECORDING: CPT

## 2022-04-08 NOTE — PROGRESS NOTES
"Per Dr. Kamara, patient to have Zio monitor placed.  Diagnosis: Palpitations [R00.2]  Monitor placed: {YES / NO:169982::\"Yes\"}  Patient Instructed: {YES / NO:630592::\"Yes\"}  Patient verbalized understanding: {YES / NO:811622::\"Yes\"}  Holter # [id number]      "

## 2022-04-19 ENCOUNTER — PRE VISIT (OUTPATIENT)
Dept: DERMATOLOGY | Facility: CLINIC | Age: 67
End: 2022-04-19

## 2022-04-25 ENCOUNTER — MYC MEDICAL ADVICE (OUTPATIENT)
Dept: CARDIOLOGY | Facility: CLINIC | Age: 67
End: 2022-04-25
Payer: COMMERCIAL

## 2022-04-26 ENCOUNTER — HOSPITAL ENCOUNTER (OUTPATIENT)
Dept: NUCLEAR MEDICINE | Facility: CLINIC | Age: 67
Setting detail: NUCLEAR MEDICINE
Discharge: HOME | End: 2022-04-26
Attending: INTERNAL MEDICINE
Payer: COMMERCIAL

## 2022-04-26 ENCOUNTER — HOSPITAL ENCOUNTER (OUTPATIENT)
Dept: CARDIOLOGY | Facility: CLINIC | Age: 67
Discharge: HOME | End: 2022-04-26
Attending: INTERNAL MEDICINE
Payer: COMMERCIAL

## 2022-04-26 DIAGNOSIS — R06.09 DOE (DYSPNEA ON EXERTION): ICD-10-CM

## 2022-04-26 DIAGNOSIS — R07.9 CHEST PAIN, UNSPECIFIED TYPE: ICD-10-CM

## 2022-04-26 PROCEDURE — A9502 TC99M TETROFOSMIN: HCPCS | Performed by: INTERNAL MEDICINE

## 2022-04-26 PROCEDURE — 250N000011 HC RX IP 250 OP 636: Performed by: INTERNAL MEDICINE

## 2022-04-26 PROCEDURE — 93018 CV STRESS TEST I&R ONLY: CPT | Performed by: INTERNAL MEDICINE

## 2022-04-26 PROCEDURE — 78452 HT MUSCLE IMAGE SPECT MULT: CPT | Mod: 26 | Performed by: RADIOLOGY

## 2022-04-26 PROCEDURE — 343N000001 HC RX 343: Performed by: INTERNAL MEDICINE

## 2022-04-26 PROCEDURE — 93016 CV STRESS TEST SUPVJ ONLY: CPT | Performed by: INTERNAL MEDICINE

## 2022-04-26 PROCEDURE — 93017 CV STRESS TEST TRACING ONLY: CPT

## 2022-04-26 PROCEDURE — 78452 HT MUSCLE IMAGE SPECT MULT: CPT

## 2022-04-26 RX ORDER — AMINOPHYLLINE 25 MG/ML
50-100 INJECTION, SOLUTION INTRAVENOUS
Status: DISCONTINUED | OUTPATIENT
Start: 2022-04-26 | End: 2022-04-27 | Stop reason: HOSPADM

## 2022-04-26 RX ORDER — CAFFEINE CITRATE 20 MG/ML
60 SOLUTION INTRAVENOUS
Status: DISCONTINUED | OUTPATIENT
Start: 2022-04-26 | End: 2022-04-27 | Stop reason: HOSPADM

## 2022-04-26 RX ORDER — REGADENOSON 0.08 MG/ML
0.4 INJECTION, SOLUTION INTRAVENOUS ONCE
Status: COMPLETED | OUTPATIENT
Start: 2022-04-26 | End: 2022-04-26

## 2022-04-26 RX ORDER — ACYCLOVIR 200 MG/1
0-1 CAPSULE ORAL
Status: DISCONTINUED | OUTPATIENT
Start: 2022-04-26 | End: 2022-04-27 | Stop reason: HOSPADM

## 2022-04-26 RX ORDER — ALBUTEROL SULFATE 90 UG/1
2 AEROSOL, METERED RESPIRATORY (INHALATION) EVERY 5 MIN PRN
Status: DISCONTINUED | OUTPATIENT
Start: 2022-04-26 | End: 2022-04-27 | Stop reason: HOSPADM

## 2022-04-26 RX ADMIN — REGADENOSON 0.4 MG: 0.08 INJECTION, SOLUTION INTRAVENOUS at 09:53

## 2022-04-26 RX ADMIN — TETROFOSMIN 11 MCI.: 1.38 INJECTION, POWDER, LYOPHILIZED, FOR SOLUTION INTRAVENOUS at 08:48

## 2022-04-26 RX ADMIN — TETROFOSMIN 40 MCI.: 1.38 INJECTION, POWDER, LYOPHILIZED, FOR SOLUTION INTRAVENOUS at 09:57

## 2022-04-26 NOTE — PROGRESS NOTES
Pt here for Lexiscan nuclear stress test.  Medication and side effects reviewed with patient. Lung sounds clear to auscultation bilaterally.  Denied caffeine use.  Patient tolerated Lexiscan dose without any adverse reactions.  VSS.  Monitored post injection and then taken to the [facility] waiting room and instructed to wait there for nuclear medicine tech for follow up imaging.

## 2022-04-27 LAB
CV STRESS MAX HR HE: 86
RATE PRESSURE PRODUCT: NORMAL
STRESS ECHO BASELINE DIASTOLIC HE: 77
STRESS ECHO BASELINE HR: 56 BPM
STRESS ECHO BASELINE SYSTOLIC BP: 132
STRESS ECHO CALCULATED PERCENT HR: 55 %
STRESS ECHO LAST STRESS DIASTOLIC BP: 74
STRESS ECHO LAST STRESS SYSTOLIC BP: 144
STRESS ECHO TARGET HR: 155

## 2022-04-28 ENCOUNTER — MYC MEDICAL ADVICE (OUTPATIENT)
Dept: CARDIOLOGY | Facility: CLINIC | Age: 67
End: 2022-04-28
Payer: COMMERCIAL

## 2022-05-05 ENCOUNTER — ANCILLARY PROCEDURE (OUTPATIENT)
Dept: CARDIOLOGY | Facility: CLINIC | Age: 67
End: 2022-05-05
Attending: INTERNAL MEDICINE
Payer: COMMERCIAL

## 2022-05-05 DIAGNOSIS — R06.09 DOE (DYSPNEA ON EXERTION): ICD-10-CM

## 2022-05-05 LAB — LVEF ECHO: NORMAL

## 2022-05-05 PROCEDURE — 93306 TTE W/DOPPLER COMPLETE: CPT | Mod: GC | Performed by: INTERNAL MEDICINE

## 2022-05-09 ENCOUNTER — OFFICE VISIT (OUTPATIENT)
Dept: DERMATOLOGY | Facility: CLINIC | Age: 67
End: 2022-05-09
Attending: DERMATOLOGY
Payer: COMMERCIAL

## 2022-05-09 VITALS — DIASTOLIC BLOOD PRESSURE: 94 MMHG | SYSTOLIC BLOOD PRESSURE: 154 MMHG | HEART RATE: 59 BPM

## 2022-05-09 DIAGNOSIS — C44.619 BCC (BASAL CELL CARCINOMA), ARM, LEFT: Primary | ICD-10-CM

## 2022-05-09 PROCEDURE — 17313 MOHS 1 STAGE T/A/L: CPT | Performed by: DERMATOLOGY

## 2022-05-09 PROCEDURE — 12032 INTMD RPR S/A/T/EXT 2.6-7.5: CPT | Performed by: DERMATOLOGY

## 2022-05-09 ASSESSMENT — PAIN SCALES - GENERAL: PAINLEVEL: NO PAIN (0)

## 2022-05-09 NOTE — LETTER
5/9/2022       RE: Evan Gregg  [address] [facility] [address]     Dear Colleague,    Thank you for referring your patient, Evan Gregg, to the [facility] DERMATOLOGIC SURGERY CLINIC [place] [facility]. Please see a copy of my visit note below.    [facility] Mohs Surgery Procedure Note    Case #: 1  Date of Service:  May 9, 2022  Surgery: Mohs micrographic surgery (MMS)  Staff surgeon: Robbie White MD  Fellow surgeon: Jersey Willett MD  Resident surgeon: None  Nurse: Mel Clinton CMA    Tumor Type: BCC  Location: left arm  Derm-Path Accession #: [id number]      Mohs Accession #:   Pre-Op Size: 2.1 cm x 1.4 cm  Final Defect Size: 2.5 cm x 2.1 cm  Number of Mohs stages: 1  Level of Defect: Fat  Local anesthetic: 6 mL 1% lidocaine with epinephrine  Repair Type: Intermediate repair/Purse String  Repair Size: 3.0 cm  Suture Material: 4-0 Monocryl    Procedure:    Stage I  We discussed the principles of treatment and most likely complications including scarring, bleeding, infection, swelling, pain, crusting, nerve damage, large wound,  incomplete excision, wound dehiscence,  nerve damage, recurrence, and a second procedure may be recommended to obtain the best cosmetic or functional result.    Informed consent was obtained and the patient underwent the procedure as follows:  The patient was placed supine on the operating table.  The cancer was identified, outlined with a marker, and verified by the patient.  The entire surgical field was prepped with chlorhexidine.  The surgical site was anesthetized using lidocaine with epinephrine.    The area of clinically apparent tumor was debulked. The layer of tissue was then surgically excised using a #15 blade and was then transferred onto a specimen sheet maintaining the orientation of the specimen. Hemostasis was obtained using electrocoagulation. The wound site was then  covered with a dressing while the tissue samples were processed for examination.    The excised tissue was transported to the [facility] histology laboratory maintaining the tissue orientation.  The tissue specimen was relaxed so that the entire surgical margin was in a a single horizontal plane for sectioning and inked for precise mapping.  A precise reference map was drawn to reflect the sectioning of the specimen, colored inking of the margins, and orientation on the patient.  The tissue was processed using horizontal sectioning of the base and continuous peripheral margins.  The histopathologic sections were reviewed in conjunction with the reference map.    Total blocks: 1  Total slides: 2    There were no cancer cells visualized on examination, therefore Mohs surgery was complete.    REPAIR: An intermediate layered linear closure was selected as the procedure which would maximally preserve both function and cosmesis.    After the excision of the tumor, the area was carefully undermined. Hemostasis was obtained with electrocoagulation.  Closure was oriented so that the wound was parallel to the patient's relaxed skin tension lines.   The subcutaneous and dermal layers were then closed with 4-0 Monocryl sutures. The epidermis was then carefully approximated along the length of the wound using 4-0 Monocryl subcuticular sutures.     Estimated blood loss was less than 10 ml for all surgical sites. A sterile pressure dressing was applied and wound care instructions, with a written handout, were given. The patient was discharged from the [facility] alert and ambulatory.    Follow-up for suture removal: Not applicable as only dissolving sutures used    Robbie White MD was immediately available for the entire surgery and was physicially present for the key portions of the procedure.    Dr. Jersey Willett (Mohs micrographic surgery fellow) performed the Mohs micrographic surgery and reconstruction under the direct  supervision of Robbie White MD, who was present for the entire micrographic surgery and key portions of the reconstruction, and always immediately available.    Scribe Disclosure:  I, Perez Meyer, am serving as a scribe to document services personally performed by Robbie White MD based on data collection and the provider's statements to me.       Attending attestation:  I was present for key elements of the procedure and immediately available for all other portions of the procedure.  I have reviewed the note and edited it as necessary.    Robbie White M.D.  Professor  Director of Dermatologic Surgery  Department of Dermatology  [facility]    Dermatology Surgery Clinic  [facility] Surgery [place] [phone]

## 2022-05-09 NOTE — NURSING NOTE
Dermatology Rooming Note    Evan Gregg's goals for this visit include:   Chief Complaint   Patient presents with     Derm Problem     John is here today for a MOHS procedure due to a BCC on the left arm     Berenice Rivas CMA

## 2022-05-09 NOTE — PROGRESS NOTES
[facility] Mohs Surgery Procedure Note    Case #: 1  Date of Service:  May 9, 2022  Surgery: Mohs micrographic surgery (MMS)  Staff surgeon: Robbie White MD  Fellow surgeon: Jersey Willett MD  Resident surgeon: None  Nurse: Mel Clinton CMA    Tumor Type: BCC  Location: left arm  Derm-Path Accession #: [id number]      Mohs Accession #:   Pre-Op Size: 2.1 cm x 1.4 cm  Final Defect Size: 2.5 cm x 2.1 cm  Number of Mohs stages: 1  Level of Defect: Fat  Local anesthetic: 6 mL 1% lidocaine with epinephrine  Repair Type: Intermediate repair/Purse String  Repair Size: 3.0 cm  Suture Material: 4-0 Monocryl    Procedure:    Stage I  We discussed the principles of treatment and most likely complications including scarring, bleeding, infection, swelling, pain, crusting, nerve damage, large wound,  incomplete excision, wound dehiscence,  nerve damage, recurrence, and a second procedure may be recommended to obtain the best cosmetic or functional result.    Informed consent was obtained and the patient underwent the procedure as follows:  The patient was placed supine on the operating table.  The cancer was identified, outlined with a marker, and verified by the patient.  The entire surgical field was prepped with chlorhexidine.  The surgical site was anesthetized using lidocaine with epinephrine.    The area of clinically apparent tumor was debulked. The layer of tissue was then surgically excised using a #15 blade and was then transferred onto a specimen sheet maintaining the orientation of the specimen. Hemostasis was obtained using electrocoagulation. The wound site was then covered with a dressing while the tissue samples were processed for examination.    The excised tissue was transported to the Mohs histology laboratory maintaining the tissue orientation.  The tissue specimen was relaxed so that the entire surgical margin was in a a single horizontal plane for sectioning and inked for precise  mapping.  A precise reference map was drawn to reflect the sectioning of the specimen, colored inking of the margins, and orientation on the patient.  The tissue was processed using horizontal sectioning of the base and continuous peripheral margins.  The histopathologic sections were reviewed in conjunction with the reference map.    Total blocks: 1  Total slides: 2    There were no cancer cells visualized on examination, therefore Mohs surgery was complete.    REPAIR: An intermediate layered linear closure was selected as the procedure which would maximally preserve both function and cosmesis.    After the excision of the tumor, the area was carefully undermined. Hemostasis was obtained with electrocoagulation.  Closure was oriented so that the wound was parallel to the patient's relaxed skin tension lines.   The subcutaneous and dermal layers were then closed with 4-0 Monocryl sutures. The epidermis was then carefully approximated along the length of the wound using 4-0 Monocryl subcuticular sutures.     Estimated blood loss was less than 10 ml for all surgical sites. A sterile pressure dressing was applied and wound care instructions, with a written handout, were given. The patient was discharged from the [facility] alert and ambulatory.    Follow-up for suture removal: Not applicable as only dissolving sutures used    Robbie White MD was immediately available for the entire surgery and was physicially present for the key portions of the procedure.    Dr. Jersey Willett (Mohs micrographic surgery fellow) performed the Mohs micrographic surgery and reconstruction under the direct supervision of Robbie White MD, who was present for the entire micrographic surgery and key portions of the reconstruction, and always immediately available.    Scribe Disclosure:  IPerez, am serving as a scribe to document services personally performed by Robbie White MD based on data collection and the provider's  statements to me.       Attending attestation:  I was present for key elements of the procedure and immediately available for all other portions of the procedure.  I have reviewed the note and edited it as necessary.    Robbie White M.D.  Professor  Director of Dermatologic Surgery  Department of Dermatology  [facility]    Dermatology Surgery Clinic  [facility] Surgery [place] [phone]

## 2022-05-09 NOTE — PATIENT INSTRUCTIONS
Wound care    I will experience scar (unavoidable when the skin is cut with surgery), and may experience bleeding, swelling, pain, crusting, and redness. I may experience incomplete removal (very uncommon) or recurrence (very uncommon). Risks of the procedure are bleeding, bruising, swelling, infection, nerve damage, and a large wound. These occur very uncommonly but are still theoretically possible.    Caring for your skin after surgery    After your surgery, a pressure bandage will be placed over the area that has stitches. This is important to prevent bleeding. Please follow these instructions over the next 1 to 2 weeks. Following this regimen will help to prevent complications as your wound heals and produce the best possible scar over time.    For the first 48 hours after your surgery:    Leave the pressure dressing on and keep it dry. If it should come loose, you may re-tape it, but do not take it off.  Relax and take it easy. Do not do any vigorous exercise or heavy lifting. This could cause the wound to bleed, sutures to break, and the wound to enlarge.  Post-operative pain is usually mild to moderate. Please see the detailed pain medication instructions at the bottom of this message regarding the use of Tylenol (acetaminophen) and ibuprofen.  Avoid alcohol as this may increase your tendency to bleed.  You may put an ice pack around the bandaged area for 20 minutes at a time as needed multiple times daily. This may help reduce swelling, bruising, and pain. Make sure the ice pack is waterproof so that the pressure bandage doesn t get wet.  If your surgical wound is on the face, try your best to sleep with your head elevated. Either in a recliner or propped up in bed with pillows as this will decrease swelling around the eyes, especially when surgery was on the upper face or mid-face.  You may see a small amount of drainage or blood on your pressure bandage. This is normal. However:  If drainage or bleeding  continues or saturates the bandage, you will need to apply firm pressure over the bandage with a piece of gauze, a clean towel, or a waterproof ice pack (an ice pack is best) for 20 minutes continuously without letting up the pressure early. If you let up the pressure earlier than 20 minutes, then the timer restarts.   If after applying continuous pressure for 20 minutes you check and still see active bleeding, then continue for an additional 20 minute period. Waterproof ice packs are best for applying pressure.  If bleeding still continues after two 20-minute periods, call our office or go to the nearest emergency room.    Remove pressure dressing 48 hours after surgery:    Carefully remove the pressure bandage. If it seems sticky or too difficult to get off, you may need to soak it with room temperature water, such as with wet compresses or in the shower.  After the pressure dressing is removed, you may shower and get the wound wet. However, do not let the forceful stream of the direct water pressure from the shower hit the wound directly. This will interfere with wound healing by knocking of skin cells that are migrating into the wound to heal the wound.  Follow these wound care and dressing change instructions:  In the shower, wash the surgical site LAST with its own separate clean wash cloth. Wash everything but the surgical site first, THEN wash the surgical site to avoid contamination from other body areas.  You may allow water to run over the site - just not shooting water on top of the surgical site. Take a clean wash cloth wet with soapy warm water and gently pat the suture site to help remove any crust or drainage that may develop.  Do not rub or scrub the site  After the site is clean, pat dry and apply a thin layer of Vaseline ointment over the suture site with a cotton swab or clean finger.   Cover the suture site with Telfa (non-stick) dressing. You may tape a piece of gauze over the Telfa for extra  "protection if you wish. If you do not have Telfa, then use a clean bandage that is large enough to cover the surgical site without sticky adhesive directly touching the wound or sutures, which would be painful to rip off.  Continue wound care at least once a day, or twice a day if you are active or around a dirty or contaminated environment for work.  Continue daily wound care until your surgical site is completely healed. To determine this, around 2 weeks post procedure, you can take a cotton swab with a small amount of hydrogen peroxide and roll it on the suture site. If the site bubbles and turns white, then your wound is still healing and has not completely sealed shut. Please continue wound care until the area no longer bubbles up from the hydrogen peroxide.  Regarding dissolving stitches: if you have been told your stitches are dissolving (also called \"absorbable sutures\"), they should dissolve in 1-2 weeks. If the absorbable/dissolving stitches do not dissolve by 1-2 weeks, you can use a Q-tip with hydrogen peroxide on it and roll it along the suture line to help the stitches dissolve and come out. Please give us a call if stitches are still present after two weeks. If you do not keep your wound moist at all times, such as with Vaseline or petroleum jelly, while it heals, the dissolving sutures will dry out and not dissolve. They need a moist environment in order to dissolve normally, which is why a coat of Vaseline is important every day.      Follow up is typically a 3-month scar evaluation appointment either in-person in the clinic, or via a telephone visit with you sending in a photo via [employer]. This is unless you have been told to follow up sooner, or if you have concerns and would like to be see sooner for a post-operative issue. Please call or send us a My[employer] message with a photo if possible. A My[employer] message with a photo is usually the fastest way for us to be able to make an assessment of a " "post-operative issue.        What to expect:    The first couple of days your wound may be tender and may bleed slightly when doing wound care.  There may be swelling and bruising around the wound, especially if it is near the eyes. For surgeries of the upper face or mid-face, \"black eyes\" (bruising within the eye sockets) and swelling around the eyes is very common. For your comfort, you may apply ice or cold compresses to the bruises after your have removed the pressure bandage.  The area around your wound may be numb for several weeks or even months. Nerve endings are typically the slowest thing to regenerate and can take even up to 1 year to completely regenerate. Very, very rarely, patients can report permanent changes in their sensation after surgery.  You may experience periodic sharp pain or mild itching around the wound as it heals.  The suture line will look dark and raised for a while but will lighten and flatten over time. This can take up to 3-6 months.    For post-operative pain control:    If you are able to take acetaminophen (\"Tylenol,\" etc.) and ibuprofen (\"Advil\" or \"Motrin,\" etc.), then you may STAGGER these medications by taking 400 mg of ibuprofen (usually two tabs) every 8 hours and 1,000 mg of acetaminophen (e.g., two tabs of extra-strength Tylenol) every 8 hours.    This means, for example, that you could take the followin,000 mg of Tylenol, followed 4 hours later by 400 mg Ibuprofen, followed 4 hours later with 1,000 mg of Tylenol, followed 4 hours later by 400 mg Ibuprofen, followed 4 hours later with 1,000 mg of Tylenol, and so forth.     Essentially, you can take either 1,000 mg of Tylenol or 400 mg of ibuprofen in alternating fashion EVERY FOUR HOURS.    Do NOT exceed more than 4,000 mg of Tylenol or 3,200 mg of ibuprofen per 24 hours. If you are not able to take Tylenol or ibuprofen as above due to other health issues (or a physician has told you directly that you are not allowed " to take one of them, say due to pre-existing severe liver or kidney issues), then disregard the above directions.    Scientific evidence supports that this combination/schedule of pain medications is just as effective, if not more effective, than taking a narcotic pain medicine.      When to contact us:    You have bleeding that will not stop after applying pressure, including with waterproof ice packs, as above.  You have pain that is not controlled with Tylenol (acetaminophen) and/or ibuprofen.  You have signs or symptoms of an infection, such as:  Fever over 100 degrees Fahrenheit  Redness expanding half an inch past the surgery site, or foul-smelling drainage from the wound  If you have any follow-up questions, or are not sure how to take care of the wound.    Note that [employer] is often the fastest way to contact us, and we recommend registering for [employer] if you are able to and have technology access. You can register for [employer] by going to [employer] or calling [phone] and asking for help with registering.    Phone numbers:    During business hours (M-F 8:00-4:30 p.m.)  [facility]-  [phone] Option 1 appt. desk  [phone]  Option 3 nurse triage line  ---------------------------------------------------------  Evenings/Weekends/Holidays  Hospital - [phone]   TTY for hearing jetjfnee-[phone]  *Ask  to page dermatologist on-call  Emergency Ytph-[phone]  TTY for hearing impaired- [phone]

## 2022-05-23 ENCOUNTER — MYC MEDICAL ADVICE (OUTPATIENT)
Dept: DERMATOLOGY | Facility: CLINIC | Age: 67
End: 2022-05-23
Payer: COMMERCIAL

## 2022-05-23 ENCOUNTER — OFFICE VISIT (OUTPATIENT)
Dept: DERMATOLOGY | Facility: CLINIC | Age: 67
End: 2022-05-23
Payer: COMMERCIAL

## 2022-05-23 ENCOUNTER — NURSE TRIAGE (OUTPATIENT)
Dept: NURSING | Facility: CLINIC | Age: 67
End: 2022-05-23
Payer: COMMERCIAL

## 2022-05-23 DIAGNOSIS — Z85.828 HISTORY OF NONMELANOMA SKIN CANCER: Primary | ICD-10-CM

## 2022-05-23 DIAGNOSIS — T81.30XA WOUND DEHISCENCE: ICD-10-CM

## 2022-05-23 PROCEDURE — 99213 OFFICE O/P EST LOW 20 MIN: CPT | Performed by: PHYSICIAN ASSISTANT

## 2022-05-23 PROCEDURE — 99000 SPECIMEN HANDLING OFFICE-LAB: CPT | Performed by: PATHOLOGY

## 2022-05-23 PROCEDURE — 87070 CULTURE OTHR SPECIMN AEROBIC: CPT | Mod: 90 | Performed by: PATHOLOGY

## 2022-05-23 ASSESSMENT — PAIN SCALES - GENERAL: PAINLEVEL: NO PAIN (0)

## 2022-05-23 NOTE — TELEPHONE ENCOUNTER
Patient had visit on 5/23/22 with Daiana Cazares to discuss concerns.    Roxanne Jeffrey CMA on 5/23/2022 at 1:38 PM

## 2022-05-23 NOTE — TELEPHONE ENCOUNTER
May 9, 2022   left arm   Mohs micrographic surgery (MMS)    Pt reporting, the area on the left elbow has not sealed and it has been 2 weeks since the procedure.      There is an opening about a dime size in diameter that will not heal.       Pt reporting, the area is red, warm, tender to the touch.   Yellowish colored drainage from the wound area.     No fever noted.     But, ever time the Pt changes the bandages.  There is yellowish pus colored drainage on the gauze.    Today there was blood colored drainage that soaked through the bandage.     Office visit today at 12:00 noon for an evaluation for Pt care.         Sophia Fay RN  Central Triage Red Flags/Med Refills    Reason for Disposition    Incision looks infected (spreading redness, pain)    Additional Information    Negative: Major abdominal surgical incision and wound gaping open with visible internal organs    Negative: Sounds like a life-threatening emergency to the triager    Negative: Bleeding from incision and won't stop after 10 minutes of direct pressure    Negative: Widespread rash and bright red, sunburn-like    Negative: Severe pain in the incision    Negative: Incision gaping open and < 2 days (48 hours) since wound re-opened    Negative: Incision gaping open and length of opening > 2 inches (5 cm)    Negative: Patient sounds very sick or weak to the triager    Negative: Sounds like a serious complication to the triager    Negative: Fever > 100.4 F (38.0 C)    Protocols used: POST-OP INCISION SYMPTOMS AND [id number]

## 2022-05-23 NOTE — PROGRESS NOTES
[facility] Dermatology Note  Encounter Date: May 23, 2022  Office Visit     Dermatology Problem List:  1. Rosacea, no flares for years  - prior tx: metronidazole  2. Eczema  - triamcinolone 0.1% and 0.025% cream  3. Contact dermatitis, secondary to poison ivy exposure.   4. History NMSC  - BCC, left forearm s/p ED&C 2013  - BCC, left lateral elbow, s/p Mohs 5/9/2022    ____________________________________________    Assessment & Plan:    # Wound dehiscence, left lateral elbow  - Photodocumentation today  - Swab obtained for culture today  - Continue wound care: keep covered with Vaseline  -Recommend holding off of regular activity through this week.     Procedures Performed:   None    Follow-up: prn for new or changing lesions (will see Dr Bradford for Allergy and follow up skin check in 4 months)    Staff and Scribe:     Scribe Disclosure:  I, Perez Meyer, am serving as a scribe to document services personally performed by Daiana Cazares PA-C based on data collection and the provider's statements to me.     Provider Disclosure:   The documentation recorded by the scribe accurately reflects the services I personally performed and the decisions made by me.    All risks, benefits and alternatives were discussed with patient.  Patient is in agreement and understands the assessment and plan.  All questions were answered.  Sun Screen Education was given.   Return to Clinic in 2 months or sooner as needed.   Daiana Cazares PA-C   [facility]   ____________________________________________    CC: Derm Problem (John is here for a wound check post surgery on his L elbow. )    HPI:  Mr. Evan Gregg is a(n) 67 year old male who presents today as a return patient for wound check. Last seen in dermatology by Dr. White on 5/9/2022, at which time patient underwent Mohs surgery for treatment of BCC, left arm.    Today, patient reports that yesterday (2 weeks  after Mohs surgery) his surgical site reopened and bled. He has been using Vaseline and nonstick adhesive for wound care. Prior to this surgery, he has never had sutures. Denies discomfort.    Patient is otherwise feeling well, without additional skin concerns.    Labs Reviewed:  N/A    Physical Exam:  Vitals: There were no vitals taken for this visit.  SKIN: Focused examination of left arm was performed.  - Erythema surrounding surgical wound with slight dehiscence noted centrally, approximately 2-3 mm. Non tender.   - No other lesions of concern on areas examined.             Medications:  Current Outpatient Medications   Medication     aspirin (ASA) 81 MG EC tablet     ketoconazole (NIZORAL) 2 % external shampoo     metroNIDAZOLE (METROGEL) 0.75 % gel     rosuvastatin (CRESTOR) 20 MG tablet     triamcinolone (KENALOG) 0.025 % cream     triamcinolone (KENALOG) 0.025 % cream     triamcinolone (KENALOG) 0.1 % external cream     Current Facility-Administered Medications   Medication     lidocaine 1% with EPINEPHrine 1:100,000 injection 3 mL      Past Medical History:   Patient Active Problem List   Diagnosis     Rosacea     CARDIOVASCULAR SCREENING; LDL GOAL LESS THAN 160     Plantar wart of left foot     BCC (basal cell carcinoma), arm     Osteoarthritis of hip     Shoulder pain, right     Right elbow pain     Pulmonary nodules     Past Medical History:   Diagnosis Date     Anxiety 7/23/2019     Arthritis 3/25/2014     Depressive disorder 7/23/2019     Hearing problem 7/23/2019     Reduced vision 7/23/2019        CC Referred Self, MD  No address on file on close of this encounter.

## 2022-05-23 NOTE — LETTER
5/23/2022       RE: Evan Gregg  [address] [facility] [address]     Dear Colleague,    Thank you for referring your patient, Evan Gregg, to the [facility] [place] [facility]. Please see a copy of my visit note below.    [facility] Dermatology Note  Encounter Date: May 23, 2022  Office Visit     Dermatology Problem List:  1. Rosacea, no flares for years  - prior tx: metronidazole  2. Eczema  - triamcinolone 0.1% and 0.025% cream  3. Contact dermatitis, secondary to poison ivy exposure.   4. History NMSC  - BCC, left forearm s/p ED&C 2013  - BCC, left lateral elbow, s/p Mohs 5/9/2022    ____________________________________________    Assessment & Plan:    # Wound dehiscence, left lateral elbow  - Photodocumentation today  - Swab obtained for culture today  - Continue wound care: keep covered with Vaseline  -Recommend holding off of regular activity through this week.     Procedures Performed:   None    Follow-up: prn for new or changing lesions (will see Dr Bradford for Allergy and follow up skin check in 4 months)    Staff and Scribe:     Scribe Disclosure:  I, Perez Meyer, am serving as a scribe to document services personally performed by Daiana Cazares PA-C based on data collection and the provider's statements to me.     Provider Disclosure:   The documentation recorded by the scribe accurately reflects the services I personally performed and the decisions made by me.    All risks, benefits and alternatives were discussed with patient.  Patient is in agreement and understands the assessment and plan.  All questions were answered.  Sun Screen Education was given.   Return to Clinic in 2 months or sooner as needed.   Daiana Cazares PA-C   [facility]   ____________________________________________    CC: Derm Problem (John is here for a wound  check post surgery on his L elbow. )    HPI:  Mr. Evan Gregg is a(n) 67 year old male who presents today as a return patient for wound check. Last seen in dermatology by Dr. White on 5/9/2022, at which time patient underwent Mohs surgery for treatment of BCC, left arm.    Today, patient reports that yesterday (2 weeks after Mohs surgery) his surgical site reopened and bled. He has been using Vaseline and nonstick adhesive for wound care. Prior to this surgery, he has never had sutures. Denies discomfort.    Patient is otherwise feeling well, without additional skin concerns.    Labs Reviewed:  N/A    Physical Exam:  Vitals: There were no vitals taken for this visit.  SKIN: Focused examination of left arm was performed.  - Erythema surrounding surgical wound with slight dehiscence noted centrally, approximately 2-3 mm. Non tender.   - No other lesions of concern on areas examined.             Medications:  Current Outpatient Medications   Medication     aspirin (ASA) 81 MG EC tablet     ketoconazole (NIZORAL) 2 % external shampoo     metroNIDAZOLE (METROGEL) 0.75 % gel     rosuvastatin (CRESTOR) 20 MG tablet     triamcinolone (KENALOG) 0.025 % cream     triamcinolone (KENALOG) 0.025 % cream     triamcinolone (KENALOG) 0.1 % external cream     Current Facility-Administered Medications   Medication     lidocaine 1% with EPINEPHrine 1:100,000 injection 3 mL      Past Medical History:   Patient Active Problem List   Diagnosis     Rosacea     CARDIOVASCULAR SCREENING; LDL GOAL LESS THAN 160     Plantar wart of left foot     BCC (basal cell carcinoma), arm     Osteoarthritis of hip     Shoulder pain, right     Right elbow pain     Pulmonary nodules     Past Medical History:   Diagnosis Date     Anxiety 7/23/2019     Arthritis 3/25/2014     Depressive disorder 7/23/2019     Hearing problem 7/23/2019     Reduced vision 7/23/2019        CC Referred Self, MD  No address on file on close of this encounter.

## 2022-05-23 NOTE — NURSING NOTE
Dermatology Rooming Note    Evan Gregg's goals for this visit include:   Chief Complaint   Patient presents with     Derm Problem     John is here for a wound check post surgery on his L elbow.      Frantz Jo, EMT

## 2022-05-25 LAB — BACTERIA SKIN AEROBE CULT: NORMAL

## 2022-06-14 ENCOUNTER — TELEPHONE (OUTPATIENT)
Dept: FAMILY MEDICINE | Facility: CLINIC | Age: 67
End: 2022-06-14
Payer: COMMERCIAL

## 2022-06-14 NOTE — TELEPHONE ENCOUNTER
Patient Quality Outreach    Patient is due for the following:   Hypertension -  BP check    NEXT STEPS:   Patient has upcoming appointment, these items will be addressed at that time.    Type of outreach:    Sent [employer] message.      Questions for provider review:    None     Supriya Mari CMA

## 2022-06-19 ENCOUNTER — HEALTH MAINTENANCE LETTER (OUTPATIENT)
Age: 67
End: 2022-06-19

## 2022-06-27 ENCOUNTER — TELEPHONE (OUTPATIENT)
Dept: FAMILY MEDICINE | Facility: CLINIC | Age: 67
End: 2022-06-27

## 2022-06-27 NOTE — TELEPHONE ENCOUNTER
Patient Quality Outreach    Patient is due for the following:   IVD  -  IVD Follow-up Visit    NEXT STEPS:   Schedule a office visit for IVD    Type of outreach:    Sent [employer] message.      Questions for provider review:    None     Courtney Simon, CMA

## 2022-07-07 NOTE — PROGRESS NOTES
[facility] Dermato-allergology Note  Office visit  Encounter Date: Jul 8, 2022  ____________________________________________    CC: Allergy Consult (John is here today due to having atopic Dermatitis, he is uncertain what all of the triggers are for it. Sounds like they would like to get him in for patch testing.)      HPI:  (Jul 8, 2022)  Mr. Evan Gregg is a(n) 67 year old male who presents today as new patient for allergy consultation  - had issues with dry itchy red skin since age 21. Worse in winter and when exposed to fragrances in topical products or automotive parts without gloves on. Carries diagnosis of atopic dermatitis, follows with Dr. Morales. Affects arms, upper torso, occasionally hands. Uses triamcinolone 0.1% cream during flare ups, triamcinolone 0.025% cream for maintenance, will typically apply daily during the winter.   - he has been using fragrance free soaps (Tone) and lotion (Eucerin). Uses selsium blue shampoo.   - has area of itchy redness on right lateral eyelid. This has been present since February 2022. Tried to use triamcinolone on it without much improvement.    - had an allergy test >20 years ago (unsure if patch or prick test); states that he was noted to be allergic to cats and dogs. Although at that time he experienced rhinoconjunctivitis when exposed to cats, he states that these symptoms resolved many years ago.   - otherwise feeling well in usual state of health    Physical exam:  General: In no acute distress, well-developed, well-nourished  Eyes: no conjunctivitis  ENT: no signs of rhinitis   Pulmonary: no wheezing or coughing  Skin: Focused examination of the skin on test sites was performed = see test results below  Focused examination of the face, back, arms and legs was performed.  -poorly defined patchy erythema with red papules involving right lateral eyelid  -diffuse erythema of nose  -xerosis and faintly erythematous papules of the bilateral  arms    Past Medical History:   Patient Active Problem List   Diagnosis     Rosacea     CARDIOVASCULAR SCREENING; LDL GOAL LESS THAN 160     Plantar wart of left foot     BCC (basal cell carcinoma), arm     Osteoarthritis of hip     Shoulder pain, right     Right elbow pain     Pulmonary nodules     Past Medical History:   Diagnosis Date     Anxiety 7/23/2019     Arthritis 3/25/2014     Depressive disorder 7/23/2019     Hearing problem 7/23/2019     Reduced vision 7/23/2019       Allergies:  Allergies   Allergen Reactions     No Known Drug Allergies        Medications:  Current Outpatient Medications   Medication     doxycycline hyclate (VIBRAMYCIN) 100 MG capsule     metroNIDAZOLE (METROGEL) 0.75 % external gel     pimecrolimus (ELIDEL) 1 % external cream     aspirin (ASA) 81 MG EC tablet     ketoconazole (NIZORAL) 2 % external shampoo     metroNIDAZOLE (METROGEL) 0.75 % gel     rosuvastatin (CRESTOR) 20 MG tablet     triamcinolone (KENALOG) 0.025 % cream     triamcinolone (KENALOG) 0.025 % cream     triamcinolone (KENALOG) 0.1 % external cream     Current Facility-Administered Medications   Medication     lidocaine 1% with EPINEPHrine 1:100,000 injection 3 mL       Social History:  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 He now uses gloves, which helps to prevent flare-ups.     Family History:  Family History   Problem Relation Age of Onset     Thyroid Disease Mother      Heart Disease Father 56        heart attack     Cancer Father         skin cancer     Lipids Father      Hypertension Father      Cancer - colorectal Maternal Grandfather      Thyroid Disease Brother      Cancer Brother         skin ca     Cancer Maternal Uncle 60        Lung     Glaucoma No family hx of      Macular Degeneration No family hx of        Previous Labs, Allergy Tests, Dermatopathology, Imaging:  History of  previous allergy test >20 years ago as above    Referred By: Va Morales MD  [address] [facility] [address]  [place] [address]     Allergy Tests:    Past Allergy Test    Order for Future Allergy Testing:    [] Outpatient  [] Inpatient: [facility]..../ Bed ....       Skin Atopy (atopic dermatitis) [x] Yes   [] No .........  Contact allergies:   [] Yes   [x] No ..........  Hand eczema:   [x] Yes   [x] No           Leading hand:   [] R   [] L       [] Ambidextrous         Drug allergies:        [] Yes   [x] No  which?......    Urticaria/Angioedema  [x] Yes   [] No .........has taken benadryl for it.  Food Allergy:  [] Yes   [x] No  which?......  Pets :  [x] Yes   [] No  which?......  2 dogs, 1 cat      []  Rhinitis   [] Conjunctivitis   [] Sinusitis   [] Polyposis   [] Otitis   [] Pharyngitis         []  Postnasal drip    [x]  none  Operations:   [] Tonsils   [] Septum   [] Sinus   [] Polyposis        [] Asthma bronchiale   [] Coughing      [x]  none  Symptoms (mostly Rhinoconjunctivitis and Asthma) aggravated by:  Season   [] I   [] II   [] III   [] IV   []V   []VI   []VII   []VIII   []IX   []X   []XI   []XII     [] perennial   Day time      [] morning   [] noon      [] evening        [] night    [] whole day........  [x]  none  Location/changes    [] inside        [] outside   [] mountains    [] sea     [] others.............   [x]  none  Triggers, specific     [] animals     [] plants     [] dust              [] others ...........................    [x]  none  Triggers, others       [] work          [] psyche    [] sport            [] others .............................  [x]  none  Irritant                [] phys efforts [] smoke    [] heat/cold     [] odors  []others............... [x]  none    Order for PATCH TESTS  Reason for tests (suspected allergy): recurrent dermatitis generalized  Known previous allergies: none  Standardized panels  [x] Standard panel (40 tests)  [x] Preservatives & Antimicrobials  (31 tests)  [x] Emulsifiers & Additives (25 tests)   [x] Perfumes/Flavours & Plants (25 tests)  [] Hairdresser panel (12 tests)  [x] Rubber Chemicals (22 tests)  [] Plastics (26 tests)  [] Colorants/Dyes/Food additives (20 tests)  [] Metals (implants/dental) (24 tests)  [] Local anaesthetics/NSAIDs (13 tests)  [] Antibiotics & Antimycotics (14 tests)   [] Corticosteroids (15 tests)   [] Photopatch test (62 tests)   [x] others: dust mites and molds      [] Patient's own products: ...    DO NOT test if chemical or biological identity is unknown!     always ask from patient the product information and safety sheets (MSDS)       Order for PRICK TESTS    Reason for tests (suspected allergy): atopy  Known previous allergies: none    Standardized prick panels  [x] Atopic panel (20 tests)  [] Pediatric Panel (12 tests)  [] Milk, Meat, Eggs, Grains (20 tests)   [] Dust, Epithelia, Feathers (10 tests)  [] Fish, Seafood, Shellfish (17 tests)  [] Nuts, Beans (14 tests)  [] Spice, Vegetable, Fruit (17 tests)  [] Pollen Panel = Tree, Grass, Weed (24 tests)  [] Others: ...      [] Patient's own products: ...    DO NOT test if chemical or biological identity is unknown!     always ask from patient the product information and safety sheets (MSDS)     Standardized intradermal tests  [] Penicillium notatum [] Aspergillus fumigatus [] House dust mites D.far & D. pteron  [] Cat    [] dog  [] Others: ...  [] Bee venom   [] Wasp venom  !!Specific protocol with dilutions!!       Order for Drug allergy tests (prick & Intradermal & patch tests)    [] Penicillin G  [] Ampicillin [] Cefazolin   [] Ceftriaxone   [] Ceftazidime  [] Bactrim    [] Others: ...  Order for ... as test date    Atopy Screen (Placed Jul 8, 2022)  No Substance Readings (15 min) Evaluation   POS Histamine 1mg/ml +/++    NEG NaCl 0.9% -      No Substance Readings (15 min) Evaluation   1 Alternaria alternata (tenuis)  ++    2 Cladosporium herbarum ++    3 Aspergillus  fumigatus -    4 Penicillium notatum -    5 Dermatophagoides pteronyssinus +++    6 Dermatophagoides farinae +++    7 Dog epithelium (canis spp) ++    8 Cat hair (dominga catus) +/++    9 Cockroach   (Blatella americana & germanica) -    10 Grass mix midwest   (Carlee, Orchard, Redtop, Yovanny) +    11 Alfred grass (sorghum halepense) -    12 Weed mix   (common Cocklebur, Lamb s quarters, rough redroot Pigweed, Dock/Sorrel) -    13 Mug wort (artemisia vulgare) -    14 Ragweed giant/short (ambrosia spp) +/++    15 White birch (Betula papyrifera) +/++    16 Tree mix 1 (Pecan, Maple BHR, Oak RVW, american [place], black [place]) -    17 Red cedar (juniperus virginia) -    18 Tree mix 2   (white [place], river/red Birch, black [place], common [place], american Elm) +/++    19 Box elder/Maple mix (acer spp) -    20 [place] shagbark (carya ovata) -           Conclusion    ________________________________    Assessment & Plan:    ==> Final Diagnosis:     # Atopic predisposition with:  -recurrent atopic Dermatitis  -slight rhinoconjunctivitis (not very symptomatic)  -clinically relevant? sentisization to dust mites and pet dander  * chronic illness with exacerbation, progression, side effects from treatment    # Rosacea of nose and right periorbital region   * chronic illness with exacerbation, progression, side effects from treatment   -Right periorbital region has erythematous papules consistent with rosacea    These conclusions are made at the best of one's knowledge and belief based on the provided evidence such as patient's history and allergy test results and they can change over time or can be incomplete because of missing information's.    ==> Treatment Plan:  -doxycycline 100 mg PO BID for 6 weeks for rosacea  -metronidazole 0.75% gel qAM, pimecrolimus 1% cream qPM for rosacea  -OK to continue triamcinolone 0.1% cream when atopic dermatitis flaring  -could consider dupixent in the future for atopic  dermatitis    Procedures Performed: Allergy tests, including prick tests    Staff and Resident:  Joel Bradford MD (staff); Kelvin Odonnell MD (resident)    Staff Physician Comments:  I saw and evaluated the patient with the resident and I agree with the assessment and plan as documented in the resident's note.    Joel Bradford MD  Professor  Head of Dermato-Allergy Division  Department of Dermatology  [facility]      Follow-up in Derm-Allergy clinic: as scheduled for patch testing    I spent a total of 40 minutes with Evan Gregg. This time was spent counseling the patient and/or coordinating care, explaining the allergy tests, performing allergy tests and assessing the clinical relevance.

## 2022-07-08 ENCOUNTER — PRE VISIT (OUTPATIENT)
Dept: ALLERGY | Facility: CLINIC | Age: 67
End: 2022-07-08

## 2022-07-08 ENCOUNTER — OFFICE VISIT (OUTPATIENT)
Dept: ALLERGY | Facility: CLINIC | Age: 67
End: 2022-07-08
Attending: DERMATOLOGY
Payer: COMMERCIAL

## 2022-07-08 DIAGNOSIS — J30.2 SEASONAL AND PERENNIAL ALLERGIC RHINOCONJUNCTIVITIS: ICD-10-CM

## 2022-07-08 DIAGNOSIS — L20.89 OTHER ATOPIC DERMATITIS: ICD-10-CM

## 2022-07-08 DIAGNOSIS — L71.9 ROSACEA: Primary | ICD-10-CM

## 2022-07-08 DIAGNOSIS — J30.81 PET ALLERGY: ICD-10-CM

## 2022-07-08 DIAGNOSIS — H10.10 SEASONAL AND PERENNIAL ALLERGIC RHINOCONJUNCTIVITIS: ICD-10-CM

## 2022-07-08 DIAGNOSIS — J30.89 SEASONAL AND PERENNIAL ALLERGIC RHINOCONJUNCTIVITIS: ICD-10-CM

## 2022-07-08 DIAGNOSIS — Z91.09 HOUSE DUST MITE ALLERGY: ICD-10-CM

## 2022-07-08 PROCEDURE — 99214 OFFICE O/P EST MOD 30 MIN: CPT | Mod: 25 | Performed by: DERMATOLOGY

## 2022-07-08 PROCEDURE — 95004 PERQ TESTS W/ALRGNC XTRCS: CPT | Mod: GC | Performed by: DERMATOLOGY

## 2022-07-08 RX ORDER — PIMECROLIMUS 10 MG/G
CREAM TOPICAL EVERY EVENING
Qty: 60 G | Refills: 3 | Status: SHIPPED | OUTPATIENT
Start: 2022-07-08 | End: 2022-07-08

## 2022-07-08 RX ORDER — METRONIDAZOLE 7.5 MG/G
GEL TOPICAL EVERY MORNING
Qty: 45 G | Refills: 3 | Status: SHIPPED | OUTPATIENT
Start: 2022-07-08 | End: 2022-07-08

## 2022-07-08 RX ORDER — PIMECROLIMUS 10 MG/G
CREAM TOPICAL EVERY EVENING
Qty: 60 G | Refills: 3 | Status: SHIPPED | OUTPATIENT
Start: 2022-07-08 | End: 2023-05-15

## 2022-07-08 RX ORDER — DOXYCYCLINE 100 MG/1
100 CAPSULE ORAL 2 TIMES DAILY
Qty: 84 CAPSULE | Refills: 0 | Status: SHIPPED | OUTPATIENT
Start: 2022-07-08 | End: 2023-05-15

## 2022-07-08 RX ORDER — METRONIDAZOLE 7.5 MG/G
GEL TOPICAL EVERY MORNING
Qty: 45 G | Refills: 3 | Status: SHIPPED | OUTPATIENT
Start: 2022-07-08

## 2022-07-08 RX ORDER — DOXYCYCLINE 100 MG/1
100 CAPSULE ORAL 2 TIMES DAILY
Qty: 84 CAPSULE | Refills: 0 | Status: SHIPPED | OUTPATIENT
Start: 2022-07-08 | End: 2022-07-08

## 2022-07-08 ASSESSMENT — PAIN SCALES - GENERAL: PAINLEVEL: NO PAIN (0)

## 2022-07-08 NOTE — LETTER
7/8/2022         RE: Evan Gregg  [address] [facility] [address]        Dear Colleague,    Thank you for referring your patient, Evan Gregg, to the [facility] [place]. Please see a copy of my visit note below.    [facility] Dermato-allergology Note  Office visit  Encounter Date: Jul 8, 2022  ____________________________________________    CC: Allergy Consult (John is here today due to having atopic Dermatitis, he is uncertain what all of the triggers are for it. Sounds like they would like to get him in for patch testing.)      HPI:  (Jul 8, 2022)  Mr. Evan Gregg is a(n) 67 year old male who presents today as new patient for allergy consultation  - had issues with dry itchy red skin since age 21. Worse in winter and when exposed to fragrances in topical products or automotive parts without gloves on. Carries diagnosis of atopic dermatitis, follows with Dr. Morales. Affects arms, upper torso, occasionally hands. Uses triamcinolone 0.1% cream during flare ups, triamcinolone 0.025% cream for maintenance, will typically apply daily during the winter.   - he has been using fragrance free soaps (Tone) and lotion (Eucerin). Uses selsium blue shampoo.   - has area of itchy redness on right lateral eyelid. This has been present since February 2022. Tried to use triamcinolone on it without much improvement.    - had an allergy test >20 years ago (unsure if patch or prick test); states that he was noted to be allergic to cats and dogs. Although at that time he experienced rhinoconjunctivitis when exposed to cats, he states that these symptoms resolved many years ago.   - otherwise feeling well in usual state of health    Physical exam:  General: In no acute distress, well-developed, well-nourished  Eyes: no conjunctivitis  ENT: no signs of rhinitis   Pulmonary: no wheezing or coughing  Skin: Focused examination of the skin on test sites was  performed = see test results below  Focused examination of the face, back, arms and legs was performed.  -poorly defined patchy erythema with red papules involving right lateral eyelid  -diffuse erythema of nose  -xerosis and faintly erythematous papules of the bilateral arms    Past Medical History:   Patient Active Problem List   Diagnosis     Rosacea     CARDIOVASCULAR SCREENING; LDL GOAL LESS THAN 160     Plantar wart of left foot     BCC (basal cell carcinoma), arm     Osteoarthritis of hip     Shoulder pain, right     Right elbow pain     Pulmonary nodules     Past Medical History:   Diagnosis Date     Anxiety 7/23/2019     Arthritis 3/25/2014     Depressive disorder 7/23/2019     Hearing problem 7/23/2019     Reduced vision 7/23/2019       Allergies:  Allergies   Allergen Reactions     No Known Drug Allergies        Medications:  Current Outpatient Medications   Medication     doxycycline hyclate (VIBRAMYCIN) 100 MG capsule     metroNIDAZOLE (METROGEL) 0.75 % external gel     pimecrolimus (ELIDEL) 1 % external cream     aspirin (ASA) 81 MG EC tablet     ketoconazole (NIZORAL) 2 % external shampoo     metroNIDAZOLE (METROGEL) 0.75 % gel     rosuvastatin (CRESTOR) 20 MG tablet     triamcinolone (KENALOG) 0.025 % cream     triamcinolone (KENALOG) 0.025 % cream     triamcinolone (KENALOG) 0.1 % external cream     Current Facility-Administered Medications   Medication     lidocaine 1% with EPINEPHrine 1:100,000 injection 3 mL       Social History:  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 He now uses gloves, which helps to prevent flare-ups.     Family History:  Family History   Problem Relation Age of Onset     Thyroid Disease Mother      Heart Disease Father 56        heart attack     Cancer Father         skin cancer     Lipids Father      Hypertension Father      Cancer - colorectal  Maternal Grandfather      Thyroid Disease Brother      Cancer Brother         skin ca     Cancer Maternal Uncle 60        Lung     Glaucoma No family hx of      Macular Degeneration No family hx of        Previous Labs, Allergy Tests, Dermatopathology, Imaging:  History of previous allergy test >20 years ago as above    Referred By: Va Morales MD  [address] [facility] [address]  [place] [address]     Allergy Tests:    Past Allergy Test    Order for Future Allergy Testing:    [] Outpatient  [] Inpatient: [facility]..../ Bed ....       Skin Atopy (atopic dermatitis) [x] Yes   [] No .........  Contact allergies:   [] Yes   [x] No ..........  Hand eczema:   [x] Yes   [x] No           Leading hand:   [] R   [] L       [] Ambidextrous         Drug allergies:        [] Yes   [x] No  which?......    Urticaria/Angioedema  [x] Yes   [] No .........has taken benadryl for it.  Food Allergy:  [] Yes   [x] No  which?......  Pets :  [x] Yes   [] No  which?......  2 dogs, 1 cat      []  Rhinitis   [] Conjunctivitis   [] Sinusitis   [] Polyposis   [] Otitis   [] Pharyngitis         []  Postnasal drip    [x]  none  Operations:   [] Tonsils   [] Septum   [] Sinus   [] Polyposis        [] Asthma bronchiale   [] Coughing      [x]  none  Symptoms (mostly Rhinoconjunctivitis and Asthma) aggravated by:  Season   [] I   [] II   [] III   [] IV   []V   []VI   []VII   []VIII   []IX   []X   []XI   []XII     [] perennial   Day time      [] morning   [] noon      [] evening        [] night    [] whole day........  [x]  none  Location/changes    [] inside        [] outside   [] mountains    [] sea     [] others.............   [x]  none  Triggers, specific     [] animals     [] plants     [] dust              [] others ...........................    [x]  none  Triggers, others       [] work          [] psyche    [] sport            [] others .............................  [x]  none  Irritant                [] phys efforts [] smoke    []  heat/cold     [] odors  []others............... [x]  none    Order for PATCH TESTS  Reason for tests (suspected allergy): recurrent dermatitis generalized  Known previous allergies: none  Standardized panels  [x] Standard panel (40 tests)  [x] Preservatives & Antimicrobials (31 tests)  [x] Emulsifiers & Additives (25 tests)   [x] Perfumes/Flavours & Plants (25 tests)  [] Hairdresser panel (12 tests)  [x] Rubber Chemicals (22 tests)  [] Plastics (26 tests)  [] Colorants/Dyes/Food additives (20 tests)  [] Metals (implants/dental) (24 tests)  [] Local anaesthetics/NSAIDs (13 tests)  [] Antibiotics & Antimycotics (14 tests)   [] Corticosteroids (15 tests)   [] Photopatch test (62 tests)   [x] others: dust mites and molds      [] Patient's own products: ...    DO NOT test if chemical or biological identity is unknown!     always ask from patient the product information and safety sheets (MSDS)       Order for PRICK TESTS    Reason for tests (suspected allergy): atopy  Known previous allergies: none    Standardized prick panels  [x] Atopic panel (20 tests)  [] Pediatric Panel (12 tests)  [] Milk, Meat, Eggs, Grains (20 tests)   [] Dust, Epithelia, Feathers (10 tests)  [] Fish, Seafood, Shellfish (17 tests)  [] Nuts, Beans (14 tests)  [] Spice, Vegetable, Fruit (17 tests)  [] Pollen Panel = Tree, Grass, Weed (24 tests)  [] Others: ...      [] Patient's own products: ...    DO NOT test if chemical or biological identity is unknown!     always ask from patient the product information and safety sheets (MSDS)     Standardized intradermal tests  [] Penicillium notatum [] Aspergillus fumigatus [] House dust mites D.far & D. pteron  [] Cat    [] dog  [] Others: ...  [] Bee venom   [] Wasp venom  !!Specific protocol with dilutions!!       Order for Drug allergy tests (prick & Intradermal & patch tests)    [] Penicillin G  [] Ampicillin [] Cefazolin   [] Ceftriaxone   [] Ceftazidime  [] Bactrim    [] Others: ...  Order for ... as  test date    Atopy Screen (Placed Jul 8, 2022)  No Substance Readings (15 min) Evaluation   POS Histamine 1mg/ml +/++    NEG NaCl 0.9% -      No Substance Readings (15 min) Evaluation   1 Alternaria alternata (tenuis)  ++    2 Cladosporium herbarum ++    3 Aspergillus fumigatus -    4 Penicillium notatum -    5 Dermatophagoides pteronyssinus +++    6 Dermatophagoides farinae +++    7 Dog epithelium (canis spp) ++    8 Cat hair (dominga catus) +/++    9 Cockroach   (Blatella americana & germanica) -    10 Grass mix midwest   (Carlee, Orchard, Redtop, Yovanny) +    11 Alfred grass (sorghum halepense) -    12 Weed mix   (common Cocklebur, Lamb s quarters, rough redroot Pigweed, Dock/Sorrel) -    13 Mug wort (artemisia vulgare) -    14 Ragweed giant/short (ambrosia spp) +/++    15 White birch (Betula papyrifera) +/++    16 Tree mix 1 (Pecan, Maple BHR, Oak RVW, american [place], black [place]) -    17 Red cedar (juniperus virginia) -    18 Tree mix 2   (white [place], river/red Birch, black [place], common [place], american Elm) +/++    19 Box elder/Maple mix (acer spp) -    20 Point shagbark (carya ovata) -           Conclusion    ________________________________    Assessment & Plan:    ==> Final Diagnosis:     # Atopic predisposition with:  -recurrent atopic Dermatitis  -slight rhinoconjunctivitis (not very symptomatic)  -clinically relevant? sentisization to dust mites and pet dander  * chronic illness with exacerbation, progression, side effects from treatment    # Rosacea of nose and right periorbital region   * chronic illness with exacerbation, progression, side effects from treatment   -Right periorbital region has erythematous papules consistent with rosacea    These conclusions are made at the best of one's knowledge and belief based on the provided evidence such as patient's history and allergy test results and they can change over time or can be incomplete because of missing information's.    ==> Treatment  Plan:  -doxycycline 100 mg PO BID for 6 weeks for rosacea  -metronidazole 0.75% gel qAM, pimecrolimus 1% cream qPM for rosacea  -OK to continue triamcinolone 0.1% cream when atopic dermatitis flaring  -could consider dupixent in the future for atopic dermatitis    Procedures Performed: Allergy tests, including prick tests    Staff and Resident:  Joel Bradford MD (staff); Kelvin Odonnell MD (resident)    Staff Physician Comments:  I saw and evaluated the patient with the resident and I agree with the assessment and plan as documented in the resident's note.    Joel Bradford MD  Professor  Head of Dermato-Allergy Division  Department of Dermatology  [facility]      Follow-up in Derm-Allergy clinic: as scheduled for patch testing    I spent a total of 40 minutes with Evan Gregg. This time was spent counseling the patient and/or coordinating care, explaining the allergy tests, performing allergy tests and assessing the clinical relevance.        Again, thank you for allowing me to participate in the care of your patient.        Sincerely,        Joel Bradford MD

## 2022-07-08 NOTE — NURSING NOTE
Chief Complaint   Patient presents with     Allergy Consult     John is here today due to having atopic Dermatitis, he is uncertain what all of the triggers are for it. Sounds like they would like to get him in for patch testing.     Randall Simon, Paramedic

## 2022-07-08 NOTE — PATIENT INSTRUCTIONS
House Dust Mite Allergy        The house dust mite is an arachnid about 0.3 mm in size and not visible to the naked eye. There are around 150 species of house dust mites in the world. One mite produces up to 40 fecal droppings a day. One teaspoonful of bedroom dust contains an average of nearly 1000 mites and 250,000 minute droppings.    Causes and triggers of house dust mite allergy  The house dust mite requires a warm, moist environment without light in order to live and reproduce. Our beds are ideal. The mite feeds on human and animal skin scales. The allergen is mainly contained in the mite's feces. The feces contain allergy-triggering constituents which are spread in fine dust, are breathed in and can cause an allergic reaction.    Symptoms  When the allergens come into contact with the mucous membranes in the eyes, nose, mouth and throat, sufferers develop symptoms typical of an allergic cold (allergic rhinitis) or an allergic inflammation of the conjunctiva (allergic conjunctivitis): blocked or runny nose, sneezing, red, itchy eyes. If all of these symptoms are present, then the condition is also known as rhinoconjunctivitis. Often, the upper respiratory tract becomes chronically inflamed, primarily because house dust mites are present all year round.  The symptoms of house dust mite allergy typically occur in the morning and are more frequent in the cold months of the year.    Therapy and treatment  As a first step, mattress, pillows and duvet/comforter should be placed in mite-proof or anti-mite covers, sometimes known as encasings. Alternatively you can use pillows or comforter that can be washed at over 130 F monthly. At the same time, house dust should be minimized. If necessary, the symptoms can be treated with medication, for example antihistamines in the form of nasal sprays, eye drops and tablets. Desensitization/specific immunotherapy (SIT) is recommended for house dust allergy if all the measures  "above are not sufficient.    Tips and tricks:  Keep room temperature at 66-70 F and relative air humidity at a maximum of 50%.  Ideally, thoroughly air your home two to three times a day for 5 to 10 minutes each time.  Wash bed linens in at least 130 F every week.  Remove stuffed animals or freeze them every other week.  Keep ceiling fans off in the bedroom as they can stir up dust mite allergens.  Remove dust from furniture with a damp cloth and regularly wet mop floors.  Do not put pot and hydroponic plants in the bedroom and also avoid putting too many in living areas, as they increase room humidity.  When staying overnight in other accommodations, we recommend taking your own bed linen and the above anti-mite mattress covers with you.  Remove upholstered furniture from the bedroom and consider removing the carpets. Ideally, use sealed parquet or laminate kristan, cork tiles or kristan made of wood, novilon or PVC.  Maybe additionally reduce dust mites in mattress, upholstery, or kristan using hot steam .      Modified from \"House Dust Mite Allergy\" by [employer].       _____________________________    PATCH TESTING    WHAT IS A PATCH TEST ?    A patch test is a way of identifying whether a substance has caused a delayed reaction with skin inflammation, such as contact eczema or delayed (after days) reactions to drugs. We will use various different types of test compounds, which may include common allergens in occupation and daily life such as  preservatives, fragrances or even drugs. Most of the time we will use standardized, prefabricated test solutions and the choice of the substances and series tested will depend on the history of the allergic reactions. Sometimes we will test your own products you are exposed at home or at work. In order to avoid severe toxic reactions we need detailed information s or safety sheets about each of these test compounds.    HOW IS A PATCH TEST " PERFORMED ?    You will be given three appointments to complete this test. On the first appointment the nurse will apply 100-180 small test chambers each one of them containing a different allergen on your back and/or on your arms. We will use hypoallergenic and somehow waterproof adhesive tape. Afterwards the different sites will be marked with a waterproof marker. These patches must stay in place for 2 days. On the second appointment the patches will be removed and the allergy doctor/nurse will evaluate the skin reactions and maybe re-apply the marks. On the third appointment the allergy doctor will re-evaluate possible allergic reactions and discuss with you the nature of the allergens you react to and how to avoid them.    AVOID THE FOLLOWING:    DO NOT wash your back and other test areas during the entire test period (3-5 days), NO bathing or swimming  AVOID strenuous exercise with sweating  DO NOT scratch the test area  AVOID exposure to UV irradiation (sun, therapy)    Patch tests should be performed when the allergy is resolved  Remove patch tests only if severe reaction (itch, pain, blistering) and report to your doctor immediately. Joelle then the locations of each test field  If patch tests peel off, then try to fix them and record time and joelle test field  No oral steroids (e.g. Prednisone) 1 month prior to tests    WHAT ARE THE POSSIBLE RISKS OF PATCH TESTS ?    If you are allergic to a compound tested you will develop after a few days localized skin reactions similar to your previous allergic reaction. This includes formation of red, itchy skin lesions of few mm to cm with small vesicles and even blisters. The lesions will fade off over days and weeks and might sometimes leave for a few months some skin pigmentations. In rare cases a localized reaction to patch tests can become generalized. The tests with your own products might have some risks because they are not standardized and unanticipated  reactions can occur. We need as much information as possible to evaluate if your own products are safe to test and under what conditions. This has to be evaluated for each individual product.        ? WHAT ARE THE PREPARATIONS NEEDED FOR THESE VARIOUS ALLERGY TESTS ?    Some medications can affect the reactions to allergens during the tests. Therefore reveal all the medications you take to the allergy team and the doctor will discuss with you the medications before/during the tests. Normally, you have to respect following rules (unless otherwise instructed by doctor):  For prick, intradermal and provocation tests stop antihistamines (e.g. loratadine (Claritin), cetirizine (Zyrtec), fexofenadine (Allegra) etc 1 week prior to the appointment   For patch tests and provocation tests, stop systemic corticosteroids 1 month and topical steroids 1 week prior to tests  Eat normally and take a shower before tests    _____________________________    SKIN PRICK TESTING    WHAT IS A SKIN PRICK TEST (SPT) ?    A skin prick test (SPT) is a simple and reliable diagnostic test used to identify substances ( allergens ) responsible for triggering the symptoms of allergy. The basic SPT panel includes common allergens, such as house dust mites, molds, dog and cat allergens and grass/tree pollen allergens. We have other more specialized series for various food allergens and sometimes we test your own food directly on your skin. Tests will be usually performed on the skin of the forearm (rarely on back). The skin may develop a red and itchy reaction which can be an indication of an allergy to to tested substance, but will be confirmed by discussion with the allergy specialist    HOW IS A SPT PERFORMED ?    The skin will be disinfected with 70% Isopropanol alcohol, then marked and numbered with a pen to identify the areas where the specific allergens will be tested. Afterwards a drop of the allergen solution will be placed on the skin and  then the skin gently pricked using the tip of a specially designed prick needle. With this procedure the most superficial part of the skin will be pierced to allow the test solution to diffuse into the skin and make contact with the reactive immune cells. After 15-30min the area will be examined and evaluated for possible allergic reactions.        WHAT ARE THE POSSIBLE RISKS OF SPT ?    If the skin reacts it will develop red, itchy wheals of 5-30mm diameter. The wheal and itch will usually disappear spontaneously after 1-2 hours. Sometimes there might be a delayed reactions after days and this has to be reported to the treating allergy specialist (could be another kind of reaction pattern and important piece of information). Rarely there are more serious generalized allergic reactions observed and therefore you will be under observation of the allergy team during the entire procedure. There is a small risk for some bleeding and skin infection by the SPT.    _____________________________    INTRADERMAL TESTS      WHAT IS AN INTRADERMAL TEST (IDT) ?    An intradermal test (IDT) is basically a continuation of the SPT and is sometimes proposed if the skin prick test is negative and the person is strongly suspected to have an allergy to a particular substance. IDT is particularly used for diagnosis of drug allergies and only sterile solutions will be tested by IDT. Because more allergen is delivered to the skin than in SPT the IDT can add more sensitivity to the allergy tests, but with a higher potential risk.     HOW IS AN INTRADERMAL TEST (IDT) PERFORMED ?    A small amount (~ 0.05ml) of the suspected allergen is injected with a very fine insulin needle just beneath the skin. The injections are made at spaced intervals after disinfection and marking of the skin areas. The tests are usually performed on the forearm (rarely back). The initial test will be started with a very diluted solution and if the results are  negatives the procedure might be repeated with serial dilutions of higher concentrations. Therefore, the estimated duration of this test is about 45-60 min. Sometimes we observe delayed type reactions to the intradermal tests after 1-4 days and if this is the case take a photo and inform our staff and load the photo into [employer]. Best would be to take the photos with a ruler that we know at which position the positive test was.          WHAT ARE THE POSSIBLE RISKS OF IDT ?    If the skin reacts it will develop red, itchy wheals of 5-30mm diameter. The wheal and itch will usually disappear spontaneously after 1-2 hours. Sometimes there might be a delayed reactions after days and this has to be reported to the treating allergy specialist (could be another kind of reaction pattern and important piece of information). Rarely there are more serious generalized allergic reactions observed and therefore you will be under observation of the allergy team during the entire procedure. Only sterile solutions will be used for injections, but there is still a small risk for infections or unpredictable local toxic reactions by the allergens. Some transient bleeding might occur.     _____________________________      ORAL PROVOCATION TEST      WHAT IS AN  ORAL PROVOCATION TEST (OPT) ?    An oral provocation test (OPT) is a procedure primarily used to exclude a specific allergy to a particular drug or food. These substances are then administered orally, rarely in case of drugs by subcutaneous or intravenous injections. This test is only conducted if the skin prick and intradermal and/or patch tests were negatives. A formal written consent will be taken prior to the provocation test.         HOW IS AN ORAL PROVOCATION TEST PERFORMED?    For oral (food/drugs) provocation tests you will have to ingest the food or drug hidden in a capsule or in its natural form. The test will usually be placebo-controlled and will include a capsule or  other application form not containing the suspected allergen, but non-reactive Mannitol sugar. The various drugs/food will be given at specific time intervals under strict medical supervision.     Two to six serial doses containing the test food or drug will given at normally 30min time intervals, which might vary for each individual. You will be required to stay at the clinic at all times so that the allergy doctors and nurses can early recognize and treat immediately possible allergic reactions. After the last dose you have to stay during at least 1h for observation. The entire procedure will take about 2-6hours, depending on the number of incremental doses and the observation time.     WHAT ARE THE POSSIBLE RISKS OF ORAL PROVOCATION TEST ?    The provocation tests have the highest risk potential of all the tests and therefore close observation is mandatory. The entire procedure will be discussed prior to the test (except when the placebo will be given). The reactions can go from local allergic reactions to more severe generalized reactions. Therefore, we will not perform provocation tests without prior evaluation by prick or intradermal tests and we plan to exclude an allergy by this test. If there is a higher risk, the test will be performed in a bed and with a secure intravenous access with infusion. The medication of a severe allergic reactions include high dose corticosteroids, antihistamines and if necessary epinephrine (Epi-Pen).    Useful Contact Information    Change of appointments or allergy-related [phone]  Email: [facility] [email or web address]  Location/address: [address] [place] [id number]    If you develop any serious or adverse allergic reaction after office hours please seek immediate medical assistance at the nearest clinic or emergency room.

## 2022-07-12 ENCOUNTER — TELEPHONE (OUTPATIENT)
Dept: FAMILY MEDICINE | Facility: CLINIC | Age: 67
End: 2022-07-12

## 2022-07-12 NOTE — TELEPHONE ENCOUNTER
Patient Quality Outreach    Patient is due for the following:   IVD  -  BP Check    NEXT STEPS:   Will send mychart message to have bp checked.    Type of outreach:    Sent MyChart message.      Questions for provider review:    None     Supriya Mari CMA

## 2022-07-20 ENCOUNTER — ALLIED HEALTH/NURSE VISIT (OUTPATIENT)
Dept: FAMILY MEDICINE | Facility: CLINIC | Age: 67
End: 2022-07-20
Payer: COMMERCIAL

## 2022-07-20 VITALS — SYSTOLIC BLOOD PRESSURE: 124 MMHG | DIASTOLIC BLOOD PRESSURE: 71 MMHG

## 2022-07-20 DIAGNOSIS — R03.0 ELEVATED BP WITHOUT DIAGNOSIS OF HYPERTENSION: Primary | ICD-10-CM

## 2022-07-20 PROCEDURE — 99207 PR NO CHARGE NURSE ONLY: CPT | Performed by: FAMILY MEDICINE

## 2022-11-03 ENCOUNTER — OFFICE VISIT (OUTPATIENT)
Dept: DERMATOLOGY | Facility: CLINIC | Age: 67
End: 2022-11-03
Payer: COMMERCIAL

## 2022-11-03 DIAGNOSIS — D22.9 MULTIPLE NEVI: ICD-10-CM

## 2022-11-03 DIAGNOSIS — L57.0 ACTINIC KERATOSIS: ICD-10-CM

## 2022-11-03 DIAGNOSIS — D18.01 CHERRY ANGIOMA: ICD-10-CM

## 2022-11-03 DIAGNOSIS — Z85.828 HISTORY OF NONMELANOMA SKIN CANCER: ICD-10-CM

## 2022-11-03 DIAGNOSIS — L82.0 SEBORRHEIC KERATOSES, INFLAMED: ICD-10-CM

## 2022-11-03 DIAGNOSIS — Z12.83 SKIN EXAM FOR MALIGNANT NEOPLASM: Primary | ICD-10-CM

## 2022-11-03 DIAGNOSIS — L82.1 SEBORRHEIC KERATOSES: ICD-10-CM

## 2022-11-03 DIAGNOSIS — L81.4 LENTIGO: ICD-10-CM

## 2022-11-03 PROCEDURE — 17110 DESTRUCTION B9 LES UP TO 14: CPT | Mod: GC | Performed by: DERMATOLOGY

## 2022-11-03 PROCEDURE — 17000 DESTRUCT PREMALG LESION: CPT | Mod: XS | Performed by: DERMATOLOGY

## 2022-11-03 PROCEDURE — 99213 OFFICE O/P EST LOW 20 MIN: CPT | Mod: 25 | Performed by: DERMATOLOGY

## 2022-11-03 ASSESSMENT — PAIN SCALES - GENERAL: PAINLEVEL: NO PAIN (0)

## 2022-11-03 NOTE — PROGRESS NOTES
[facility] Dermatology Note   Encounter Date: Nov 3, 2022  Office visit    Dermatology Problem List:    Last FBSE: 11/03/22    # History NMSC  - BCC, left forearm s/p ED&C 2013  - BCC, left lateral elbow, s/p Mohs 5/9/2022  # Rosacea, no flares for years  - prior tx: metronidazole  # Eczema  - triamcinolone 0.1% and 0.025% cream  # Contact dermatitis, secondary to poison ivy exposure  # AK  - s/p LN2  # ISK  - s/p LN2  ___________________________________________    Assessment & Plan:    # ISK - L calf (x1), L hand (x1), R shoulder (x1), central chest (x1), scalp (x1), and L posterior shoulder (x1)  # AK - L neck (x1)  Discussed the possible etiologies, clinical course, and management options of this condition with the patient. Will perform cryotherapy on the areas above  - See procedure note below    # Multiple clinically banal appearing nevi  # Lentigo  Discussed the possible etiologies, clinical course, and management options of this benign condition with the patient.  - Reviewed the ABCDEs of melanoma  - Recommend daily sunscreen use with SPF at least 30    # Seborrheic keratoses  # Cherry angiomata  Discussed the possible etiologies, clinical course, and management options of this benign condition with the patient.  - Reassurance provided    # H/o NMSC  No evidence of recurrence of disease  - We will continue to monitor     Procedures Performed:  Cryotherapy procedure note - premalignant  - location: L neck (x1)  - number of lesions treated: 1  After verbal consent and discussion of risks and benefits including but no limited to dyspigmentation/scar, blister, and pain, lesions treated with 1-2mm freeze border for 2 cycles with liquid nitrogen, post cryotherapy instructions provided    Cryotherapy procedure note - benign  - location: L calf (x1), L hand (x1), R shoulder (x1), central chest (x1), scalp (x1), and L posterior shoulder (x1)  - number of lesions treated: 6  After verbal consent and  discussion of risks and benefits including but no limited to dyspigmentation/scar, blister, and pain, lesions treated with 1-2mm freeze border for 2 cycles with liquid nitrogen, post cryotherapy instructions provided    Follow-up: 1y (FBSE)    Staff Involved:  Patient was seen and staffed with attending physician Dr. Austin Lou MD  Med/Derm Resident PGY-5  P:[phone]    Staff Addendum:  I was present for the entire procedure. Angela Avila MD  I, Angela Avial MD, saw this patient with the resident and agree with the resident s findings and plan of care as documented in the resident s note.    ___________________________________________      CC: Skin Check (John is here today for a skin check. He is concerned about lesions of the left temple chest and left calf)      HPI:  Mr. Evan Gregg is a(n) 67 year old male who presents to clinic today for FBSE. Reports:  - main concerns are a spot on his L calf and some spots on his left temple  - spot on L calf he noticed a couple months ago and is scaly  - has scratched at it  - denies any pain or bleeding  - spot on L temple have previously been checked and told benign but would like to make sure as they are still intermittently scaly  - otherwise feeling well in usual state of health    Physical exam:  General: in no acute distress, well-developed, well-nourished  Skin:  - skin type: medium fair  - erythematous macules with rough, gritty scale on L neck (x1)  - multiple light brown to tan papules with reassuring pigment network on dermoscopy on trunk, face, and extremities  - light brown macules on sun exposed skin  - tan to light brown waxy stuck on papules and plaques on trunk, face, scalp, and extremities. Six (L calf (x1), L hand (x1), R shoulder (x1), central chest (x1), scalp (x1), and L posterior shoulder (x1)) with an erythematous hue and evidence of excoriations  - red or purple papules that ladarius with diascopy on trunk  -  previous NMSC scar on the L elbow without nodularity or pigment changes  - No other lesions of concern on areas examined.     Medications:  Current Outpatient Medications   Medication     aspirin (ASA) 81 MG EC tablet     ketoconazole (NIZORAL) 2 % external shampoo     metroNIDAZOLE (METROGEL) 0.75 % external gel     pimecrolimus (ELIDEL) 1 % external cream     rosuvastatin (CRESTOR) 20 MG tablet     triamcinolone (KENALOG) 0.025 % cream     doxycycline hyclate (VIBRAMYCIN) 100 MG capsule     metroNIDAZOLE (METROGEL) 0.75 % gel     triamcinolone (KENALOG) 0.025 % cream     triamcinolone (KENALOG) 0.1 % external cream     Current Facility-Administered Medications   Medication     lidocaine 1% with EPINEPHrine 1:100,000 injection 3 mL      Past Medical History:   Patient Active Problem List   Diagnosis     Rosacea     CARDIOVASCULAR SCREENING; LDL GOAL LESS THAN 160     Plantar wart of left foot     BCC (basal cell carcinoma), arm     Osteoarthritis of hip     Shoulder pain, right     Right elbow pain     Pulmonary nodules     Past Medical History:   Diagnosis Date     Anxiety 7/23/2019     Arthritis 3/25/2014     Depressive disorder 7/23/2019     Hearing problem 7/23/2019     Reduced vision 7/23/2019       CC No referring provider defined for this encounter. on close of this encounter.

## 2022-11-03 NOTE — LETTER
11/3/2022       RE: Evan Gregg  [address] [facility] [address]     Dear Colleague,    Thank you for referring your patient, Evan Gregg, to the [facility] [place] at [facility]. Please see a copy of my visit note below.    [facility] Dermatology Note   Encounter Date: Nov 3, 2022  Office visit    Dermatology Problem List:    Last FBSE: 11/03/22    # History NMSC  - BCC, left forearm s/p ED&C 2013  - BCC, left lateral elbow, s/p Mohs 5/9/2022  # Rosacea, no flares for years  - prior tx: metronidazole  # Eczema  - triamcinolone 0.1% and 0.025% cream  # Contact dermatitis, secondary to poison ivy exposure  # AK  - s/p LN2  # ISK  - s/p LN2  ___________________________________________    Assessment & Plan:    # ISK - L calf (x1), L hand (x1), R shoulder (x1), central chest (x1), scalp (x1), and L posterior shoulder (x1)  # AK - L neck (x1)  Discussed the possible etiologies, clinical course, and management options of this condition with the patient. Will perform cryotherapy on the areas above  - See procedure note below    # Multiple clinically banal appearing nevi  # Lentigo  Discussed the possible etiologies, clinical course, and management options of this benign condition with the patient.  - Reviewed the ABCDEs of melanoma  - Recommend daily sunscreen use with SPF at least 30    # Seborrheic keratoses  # Cherry angiomata  Discussed the possible etiologies, clinical course, and management options of this benign condition with the patient.  - Reassurance provided    # H/o NMSC  No evidence of recurrence of disease  - We will continue to monitor     Procedures Performed:  Cryotherapy procedure note - premalignant  - location: L neck (x1)  - number of lesions treated: 1  After verbal consent and discussion of risks and benefits including but no limited to dyspigmentation/scar, blister, and pain,  lesions treated with 1-2mm freeze border for 2 cycles with liquid nitrogen, post cryotherapy instructions provided    Cryotherapy procedure note - benign  - location: L calf (x1), L hand (x1), R shoulder (x1), central chest (x1), scalp (x1), and L posterior shoulder (x1)  - number of lesions treated: 6  After verbal consent and discussion of risks and benefits including but no limited to dyspigmentation/scar, blister, and pain, lesions treated with 1-2mm freeze border for 2 cycles with liquid nitrogen, post cryotherapy instructions provided    Follow-up: 1y (FBSE)    Staff Involved:  Patient was seen and staffed with attending physician Dr. Austin Lou MD  Med/Derm Resident PGY-5  P:[phone]    Staff Addendum:  I was present for the entire procedure. Angela Avila MD  I, Angela Avila MD, saw this patient with the resident and agree with the resident s findings and plan of care as documented in the resident s note.    ___________________________________________      CC: Skin Check (John is here today for a skin check. He is concerned about lesions of the left temple chest and left calf)      HPI:  Mr. Evan Gregg is a(n) 67 year old male who presents to clinic today for FBSE. Reports:  - main concerns are a spot on his L calf and some spots on his left temple  - spot on L calf he noticed a couple months ago and is scaly  - has scratched at it  - denies any pain or bleeding  - spot on L temple have previously been checked and told benign but would like to make sure as they are still intermittently scaly  - otherwise feeling well in usual state of health    Physical exam:  General: in no acute distress, well-developed, well-nourished  Skin:  - skin type: medium fair  - erythematous macules with rough, gritty scale on L neck (x1)  - multiple light brown to tan papules with reassuring pigment network on dermoscopy on trunk, face, and extremities  - light brown macules on sun exposed  skin  - tan to light brown waxy stuck on papules and plaques on trunk, face, scalp, and extremities. Six (L calf (x1), L hand (x1), R shoulder (x1), central chest (x1), scalp (x1), and L posterior shoulder (x1)) with an erythematous hue and evidence of excoriations  - red or purple papules that ladarius with diascopy on trunk  - previous NMSC scar on the L elbow without nodularity or pigment changes  - No other lesions of concern on areas examined.     Medications:  Current Outpatient Medications   Medication     aspirin (ASA) 81 MG EC tablet     ketoconazole (NIZORAL) 2 % external shampoo     metroNIDAZOLE (METROGEL) 0.75 % external gel     pimecrolimus (ELIDEL) 1 % external cream     rosuvastatin (CRESTOR) 20 MG tablet     triamcinolone (KENALOG) 0.025 % cream     doxycycline hyclate (VIBRAMYCIN) 100 MG capsule     metroNIDAZOLE (METROGEL) 0.75 % gel     triamcinolone (KENALOG) 0.025 % cream     triamcinolone (KENALOG) 0.1 % external cream     Current Facility-Administered Medications   Medication     lidocaine 1% with EPINEPHrine 1:100,000 injection 3 mL      Past Medical History:   Patient Active Problem List   Diagnosis     Rosacea     CARDIOVASCULAR SCREENING; LDL GOAL LESS THAN 160     Plantar wart of left foot     BCC (basal cell carcinoma), arm     Osteoarthritis of hip     Shoulder pain, right     Right elbow pain     Pulmonary nodules     Past Medical History:   Diagnosis Date     Anxiety 7/23/2019     Arthritis 3/25/2014     Depressive disorder 7/23/2019     Hearing problem 7/23/2019     Reduced vision 7/23/2019       CC No referring provider defined for this encounter. on close of this encounter.

## 2022-11-03 NOTE — NURSING NOTE
Dermatology Rooming Note    Evan Gregg's goals for this visit include:   Chief Complaint   Patient presents with     Skin Check     John is here today for a skin check. He is concerned about lesions of the left temple chest and left calf     Berenice Rivas CMA

## 2022-11-03 NOTE — PATIENT INSTRUCTIONS
Cryotherapy    What is it?  Use of a very cold liquid, such as liquid nitrogen, to freeze and destroy abnormal skin cells that need to be removed    What should I expect?  Tenderness and redness  A small blister that might grow and fill with dark purple blood. There may be crusting.  More than one treatment may be needed if the lesions do not go away.    How do I care for the treated area?  Gently wash the area with your hands when bathing.  Use a thin layer of Vaseline to help with healing. You may use a Band-Aid.   The area should heal within 7-10 days and may leave behind a pink or lighter color.   Do not use an antibiotic or Neosporin ointment.   You may take acetaminophen (Tylenol) for pain.     Call your doctor if you have:  Severe pain  Signs of infection (warmth, redness, cloudy yellow drainage, and or a bad smell)  Questions or concerns    Who should I call with questions?      [facility]: [phone]      [facility]: [phone]      For urgent needs outside of business hours call the [facility] at [phone] and ask for the dermatology resident on call

## 2022-11-19 ENCOUNTER — HEALTH MAINTENANCE LETTER (OUTPATIENT)
Age: 67
End: 2022-11-19

## 2022-12-01 ENCOUNTER — TELEPHONE (OUTPATIENT)
Dept: ALLERGY | Facility: CLINIC | Age: 67
End: 2022-12-01

## 2022-12-01 NOTE — TELEPHONE ENCOUNTER
STANDARD Series                                          # Substance 2 days 4 days remarks     1 Bill Mix [C] - -       2 Colophony - -       3  2-Mercaptobenzothiazole  - -       4 Methylisothiazolinone - -       5 Carba Mix - -       6 Thiuram Mix [A] - -       7 Bisphenol A Epoxy Resin - -       8 M-Axta-Rsrjmdpevgx-Formaldehyde Resin - -       9 Mercapto Mix [A] - -       10 Black Rubber Mix- PPD [B] - -       11 Potassium Dichromate  -  -       12 Balsam of [place] (Myroxylon Pereirae Resin) - -       13 Nickel Sulphate Hexahydrate - -       14 Mixed Dialkyl Thiourea - -       15 Paraben Mix [B] - -       16 Methyldibromo Glutaronitrile - -       17 Fragrance Mix - -       18 2-Bromo-2-Nitropropane-1,3-Diol (Bronopol) CT - -       19 Lyral - -       20 Tixocortol-21- Pivalate CT - -       21 Diazolidiyl Urea (Germall II) - -       22 Methyl Methacrylate - -       23 Cobalt (II) Chloride Hexahydrate - -       24 Fragrance Mix II  - -       25 Compositae Mix - -       26 Benzoyl Peroxide - -       27 Bacitracin - -       28 Formaldehyde - -       29 Methylchloroisothiazolinone / Methylisothiazolinone - -       30 Corticosteroid Mix CT - -       31 Sodium Lauryl Sulfate - -       32 Lanolin Alcohol - -       33 Turpentine - -       34 Cetylstearylalcohol - -       35 Chlorhexidine Dicluconate - -       36 Budenoside - -       37 Imidazolidinyl Urea  - -       38 Ethyl-2 Cyanoacrylate - -       39 Quaternium 15 (Dowicil 200) - -       40 Decyl Glucoside - -       PRESERVATIVES & ANTIMICROBIALS        # Substance 2 days 4 days remarks   41 1  1,2-Benzisothiazoline-3-One, Sodium Salt - -     2  1,3,5-Deondre (2-Hydroxyethyl) - Hexahydrotriazine (Grotan BK) - -     3 8-Htzpqcbboecej-6-Nitro-1, 3-Propanediol - -     4  3, 4, 4' - Triclocarban - -    45 5 4 - Chloro - 3 - Cresol - -     6 4 - Chloro - 4 - Xylenol (PCMX) - -     7 7-Ethylbicyclooxazolidine (Bioban FT1812) - -     8 Benzalkonium Chloride CT - -     9 Benzyl  Alcohol - -    50 10 Cetalkonium Chloride - -     11 Cetylpyrimidine Chloride  - -     12 Chloroacetamide - -     13 DMDM Hydantoin - -     14 Glutaraldehyde - -    55 15 Triclosan - -     16 Glyoxal Trimeric Dihydrate - -     17 Iodopropynyl Butylcarbamate - -     18 Octylisothiazoline - -     19 Bithionol CT - -    60 20 Bioban P 1487 (Nitrobutyl) Morpholine/(Ethylnitro-Trimethylene) Dimorpholine - -     21 Phenoxyethanol - -     22 Phenyl Salicylate - -     23 Povidone Iodine - -     24 Sodium Benzoate - -    65 25 Sodium Disulfite - -     26 Sorbic Acid - -     27 Thimerosal - -     28 Melamine Formaldehyde Resin - -     29 Ethylenediamine Dihydrochloride - -      Parabens      70 30 Butyl-P-Hydroxybenzoate - -     31 Ethyl-P-Hydroxybenzoate - -     32 Methyl-P-Hydroxybenzoate - -    73 33 Propyl-P-Hydroxybenzoate - -      EMULSIFIERS & ADDITIVES       # Substance 2 days 4 days remarks   74 1 Polyethylene Glycol-400 - -    75 2 Cocamidopropyl Betaine - -     3 Amerchol L101 - -     4 Propylene Glycol - -     5 Triethanolamine - -     6 Sorbitane Sesquiolate CT - -    80 7 Isopropylmyristate - -     8 Polysorbate 80 CT - -     9 Amidoamine   (Stearamidopropyl Dimethylamine) - -     10 Oleamidopropyl Dimethylamine - -     11 Lauryl Glucoside - -    85 12 Coconut Diethanolamide  - -     13 2-Hydroxy-4-Methoxy Benzophenone (Oxybenzone) - -     14 Benzophenone-4 (Sulisobenzon) - -     15 Propolis - -     16 Dexpanthenol - -    90 17 Abitol - -     18 Tert-Butylhydroquinone - -     19 Benzyl Salicylate - -     20 Dimethylaminopropylamin (DMPA) - -     21 Zinc Pyrithione (Zinc Omadine)  - -    95 22 Deondre(Hydroxymethyl) Nitromethane  - -      Antioxidant       23 Dodecyl Gallate - -     24 Butylhydroxyanisole (BHA) - -     25 Butylhydroxytoluene (BHT) - -     26 Di-Alpha-Tocopherol (Vit E) - -    100 27 Propyl Gallate - -      PERFUMES, FLAVORS & PLANTS        # Substance 2 days 4 days remarks   101 1 Benzyl Cinnamate -  -     2 Di-Limonene (Dipentene) - -     3 Cananga Odorata (Julio Cesar Harrell) (I) - -     4 Lichen Acid Mix - -    105 5 Mentha Piperita Oil (Peppermint Oil) - -     6 Sesquiterpenelactone mix - -     7 Tea Tree Oil, Oxidized - -     8 Wood Tar Mix - -     9 Abietic Acid - -    110 10 Lavendula Angustifolia Oil (Lavender Oil) - -     11 Fragrance mix II CT * - -      Fragrance Mix I       12 Oakmoss Absolute - -     13 Eugenol - -     14 Geraniol - -    115 15 Hydroxycitronellal - -     16 Isoeugenol - -     17 Cinnamic Aldehyde - -     18 Cinnamic Alcohol  - -      Fragrance mix II       19 Citronellol - -    120 20 Alpha-Hexylcinnamic Aldehyde    - -     21 Citral - -     22 Farnesol - -    123 23 Coumarin - -      Hexylcinnamic aldehyde, Coumarin, Farnesol, Hydroxyisohexy3-cyclohexene carboxaldehyde, citral, citrolellol  RUBBER CHEMICALS        # Substance 2 days 4 days remarks     Carbamate      124 1 Zinc Bis ( Diethyldithio carbamate ) (ZDEC) - -    125 2 Zinc Bis (Dibutyldithiocarbamate) - -     3 1,3-Diphenylguanidine (DPG) - -      Thiourea       4 Dibutylthiourea - -     5 Diphenyltiourea - -     6 Thiourea - -      Mercapto chemicals      130 7 Morpholinyl Mercaptobenzothiazole - -     8 E-Ovoohenhgl-1-Benzothiazyl-Sulfenamide - -     9 Dibenzothiazyl Disulfide - -      Thiuram chemicals       10 Dipentamethylenethiuram Disulfide - -     11 Tetraethylthiuram Disulfide (Disulfiram) - -    135 12 Tetramethylthiuram Disulfide - -     13 Tetramethyl Thiuram Monosulfide (TMTM) - -      4-Phenylenediamine derivatives       14 N-Isopropyl-N'-Phenyl-P-Phenylenediamine (IPPD) - -     15 Mtprreko-L-Wbafwhwtdkqiowlp (DPPD) - -      Various Rubber Additives       16 Hydroquinone Monobenzylether  - -    140 17 Hexamethylenetetramine - -     18 4,4'-Dihydroxybiphenyl - -     19 Cyclohexylthiophtalimide - -    143 20 N-Phenyl-B-Naphthylamine - -        OTHER PRODUCTS        # Substance Conc  % solv 2 days 4 days remarks    1   "        2          3          4          5          6          7          8          9          10           Patients own products:  è DO NOT test if chemical or biological identity is unknown!   - always ask from patient the product information and safety sheets and consult with the physician   - check neutral pH with pH indicator paper (do not test pH below 5 or over 8 or consult with physician)  - leave-on cosmetics can be tested \"as is\"  - rinse-off products have to be diluted with water, buffer, olive oil or paraffin (discuss with physician)  à remember:   - non-specific toxic/corrosive or biological reactions can occur  - tests with patients own products are not standardized and test conditions are not optimized for patch tests. Whenever possible test with standardized test series and correlate use of product with the result of the patch tests  - if not sure if compounds can be tested under occlusion in patch tests consider open application tests    Results of patch tests:                         Interpretation:  - Negative                    A    = Allergic      (+) Erythema    TI   = Toxic/irritant   + E + Infiltration    RaP = Relevance at Present     ++ E/I + Papulovesicle   Rpr  = Relevance Previously     +++ E/I/P + Blister     nR   = No Relevance        "

## 2023-02-20 ENCOUNTER — HOSPITAL ENCOUNTER (EMERGENCY)
Facility: CLINIC | Age: 68
Discharge: HOME | End: 2023-02-20
Attending: PHYSICIAN ASSISTANT | Admitting: PHYSICIAN ASSISTANT
Payer: COMMERCIAL

## 2023-02-20 VITALS
TEMPERATURE: 97.6 F | RESPIRATION RATE: 18 BRPM | OXYGEN SATURATION: 98 % | DIASTOLIC BLOOD PRESSURE: 80 MMHG | SYSTOLIC BLOOD PRESSURE: 151 MMHG | HEART RATE: 62 BPM

## 2023-02-20 DIAGNOSIS — B02.9 SHINGLES: ICD-10-CM

## 2023-02-20 PROCEDURE — 99213 OFFICE O/P EST LOW 20 MIN: CPT | Performed by: PHYSICIAN ASSISTANT

## 2023-02-20 PROCEDURE — G0463 HOSPITAL OUTPT CLINIC VISIT: HCPCS | Performed by: PHYSICIAN ASSISTANT

## 2023-02-20 RX ORDER — VALACYCLOVIR HYDROCHLORIDE 1 G/1
1000 TABLET, FILM COATED ORAL 3 TIMES DAILY
Qty: 21 TABLET | Refills: 0 | Status: SHIPPED | OUTPATIENT
Start: 2023-02-20 | End: 2023-05-15

## 2023-02-20 ASSESSMENT — ENCOUNTER SYMPTOMS: CONSTITUTIONAL NEGATIVE: 1

## 2023-02-20 ASSESSMENT — ACTIVITIES OF DAILY LIVING (ADL): ADLS_ACUITY_SCORE: 35

## 2023-02-20 NOTE — ED PROVIDER NOTES
History     Chief Complaint   Patient presents with     Rash     Facial Swelling     Has a rash on the right side of his head and swelling in that extends from the back of neck to the bottom of chin. Pt states that it is painful to touch and has been getting worse over the past couple days     HPI  Evan Gregg is a 67 year old male who presents with complaints of painful rash to the right side of his head and face.  Patient states he originally developed a pain to the right side of his scalp about a week and a half ago.  Patient states since that time, he has developed progressively worsening pain, swelling, and red blistering rash along the right side of his face and scalp.  Patient states within the last couple days, he has developed new worsening painful blisters to his right face.  Denies fevers or chills.  Patient has swollen lymph nodes to the right side of his neck.  He has been treating with over-the-counter medications at home.      Allergies:  Allergies   Allergen Reactions     No Known Drug Allergies        Problem List:    Patient Active Problem List    Diagnosis Date Noted     Pulmonary nodules 03/05/2021     Priority: Medium     2/28/20 CT chest:IMPRESSION: 1. Scattered solid pulmonary nodules, stable since comparison. Patient is high risk, recommend follow-up CT in 12 months to evaluate for interval change and/or stability.       Shoulder pain, right 02/01/2018     Priority: Medium     Right elbow pain 02/01/2018     Priority: Medium     Osteoarthritis of hip 03/20/2015     Priority: Medium     BCC (basal cell carcinoma), arm 06/05/2013     Priority: Medium     Plantar wart of left foot 10/10/2012     Priority: Medium     CARDIOVASCULAR SCREENING; LDL GOAL LESS THAN 160 10/31/2010     Priority: Medium     Rosacea 01/11/2008     Priority: Medium        Past Medical History:    Past Medical History:   Diagnosis Date     Anxiety 7/23/2019     Arthritis 3/25/2014     Depressive disorder 7/23/2019      Hearing problem 7/23/2019     Reduced vision 7/23/2019       Past Surgical History:    Past Surgical History:   Procedure Laterality Date     COLONOSCOPY  11/11/2013    Procedure: COLONOSCOPY;  Colonoscopy;  Surgeon: Montana Corona MD;  Location: [facility]     HIP SURGERY  June, 2016    Resurfacing of right hip.      ORTHOPEDIC SURGERY  Summer 2016    Bilateral hip resurfacing       Family History:    Family History   Problem Relation Age of Onset     Thyroid Disease Mother      Skin Cancer Father      Heart Disease Father 56        heart attack     Cancer Father         skin cancer     Lipids Father      Hypertension Father      Cancer - colorectal Maternal Grandfather      Skin Cancer Brother      Thyroid Disease Brother      Cancer Brother         skin ca     Cancer Maternal Uncle 60        Lung     Glaucoma No family hx of      Macular Degeneration No family hx of      Melanoma No family hx of        Social History:  Marital Status:   [2]  Social History     Tobacco Use     Smoking status: Never     Smokeless tobacco: Never   Substance Use Topics     Alcohol use: Yes     Comment: ocass     Drug use: No        Medications:    valACYclovir (VALTREX) 1000 mg tablet  aspirin (ASA) 81 MG EC tablet  doxycycline hyclate (VIBRAMYCIN) 100 MG capsule  ketoconazole (NIZORAL) 2 % external shampoo  metroNIDAZOLE (METROGEL) 0.75 % external gel  metroNIDAZOLE (METROGEL) 0.75 % gel  pimecrolimus (ELIDEL) 1 % external cream  rosuvastatin (CRESTOR) 20 MG tablet  triamcinolone (KENALOG) 0.025 % cream  triamcinolone (KENALOG) 0.025 % cream  triamcinolone (KENALOG) 0.1 % external cream          Review of Systems   Constitutional: Negative.    Skin: Positive for rash.   All other systems reviewed and are negative.      Physical Exam   BP: (!) 151/80  Pulse: 62  Temp: 97.6  F (36.4  C)  Resp: 18  SpO2: 98 %      Physical Exam  Constitutional:       General: He is not in acute distress.     Appearance: Normal appearance.  He is well-developed. He is not ill-appearing, toxic-appearing or diaphoretic.   HENT:      Head: Normocephalic and atraumatic.      Right Ear: Tympanic membrane, ear canal and external ear normal.      Left Ear: Tympanic membrane, ear canal and external ear normal.      Nose: Nose normal. No mucosal edema, congestion or rhinorrhea.      Mouth/Throat:      Lips: Pink.      Mouth: Mucous membranes are moist.      Pharynx: Oropharynx is clear. Uvula midline. No pharyngeal swelling.   Eyes:      Extraocular Movements: Extraocular movements intact.      Conjunctiva/sclera: Conjunctivae normal.      Pupils: Pupils are equal, round, and reactive to light.   Cardiovascular:      Pulses: Normal pulses.   Pulmonary:      Effort: Pulmonary effort is normal.   Musculoskeletal:         General: Normal range of motion.      Cervical back: Neck supple.   Lymphadenopathy:      Head:      Right side of head: Submandibular adenopathy present.      Cervical: Cervical adenopathy present.      Right cervical: Superficial cervical adenopathy present.   Skin:     General: Skin is warm and dry.      Capillary Refill: Capillary refill takes less than 2 seconds.      Findings: Rash present.      Comments: Erythematous vesicular rash along right side of scalp and into right side of face.  Some lesions are scabbed over.  Tenderness to light palpation of the area   Neurological:      Mental Status: He is alert.      Sensory: Sensation is intact.      Motor: Motor function is intact.   Psychiatric:         Behavior: Behavior is cooperative.         ED Course                 Procedures    No results found for this or any previous visit (from the past 24 hour(s)).    Medications - No data to display    Assessments & Plan (with Medical Decision Making)     Pt is a 67 year old male who presents with complaints of painful rash to the right side of his head and face.  Patient states he originally developed a pain to the right side of his scalp about  a week and a half ago.  Patient states since that time, he has developed progressively worsening pain, swelling, and red blistering rash along the right side of his face and scalp.  Patient states within the last couple days, he has developed new worsening painful blisters to his right face.      Pt is afebrile on arrival.  Exam as above.  History and exam consistent with shingles.  Will start antivirals.  Return precautions were reviewed.  Hand-outs were provided.    Patient was sent with Valtrex and was instructed to follow-up with PCP in 3-5 days for continued care and management or sooner if new or worsening symptoms.  He is to return to the ED for persistent and/or worsening symptoms.  Patient expressed understanding of the diagnosis and plan and was discharged home in good condition.    I have reviewed the nursing notes.    I have reviewed the findings, diagnosis, plan and need for follow up with the patient.    Discharge Medication List as of 2/20/2023  1:22 PM      START taking these medications    Details   valACYclovir (VALTREX) 1000 mg tablet Take 1 tablet (1,000 mg) by mouth 3 times daily for 7 days, Disp-21 tablet, R-0, E-Prescribe             Final diagnoses:   Shingles       2/20/2023   [facility] EMERGENCY DEPT      Disclaimer:  This note consists of symbols derived from keyboarding, dictation and/or voice recognition software.  As a result, there may be errors in the script that have gone undetected.  Please consider this when interpreting information found in this chart.     Katheryn Claros PA-C  02/20/23 9920

## 2023-02-24 ENCOUNTER — VIRTUAL VISIT (OUTPATIENT)
Dept: FAMILY MEDICINE | Facility: CLINIC | Age: 68
End: 2023-02-24
Payer: COMMERCIAL

## 2023-02-24 ENCOUNTER — TELEPHONE (OUTPATIENT)
Dept: FAMILY MEDICINE | Facility: CLINIC | Age: 68
End: 2023-02-24

## 2023-02-24 DIAGNOSIS — B02.9 HERPES ZOSTER WITHOUT COMPLICATION: Primary | ICD-10-CM

## 2023-02-24 PROCEDURE — 99204 OFFICE O/P NEW MOD 45 MIN: CPT | Mod: 93 | Performed by: NURSE PRACTITIONER

## 2023-02-24 RX ORDER — PREDNISONE 20 MG/1
40 TABLET ORAL DAILY
Qty: 10 TABLET | Refills: 0 | Status: SHIPPED | OUTPATIENT
Start: 2023-02-24 | End: 2023-03-01

## 2023-02-24 RX ORDER — OXYCODONE HYDROCHLORIDE 5 MG/1
5 TABLET ORAL EVERY 6 HOURS PRN
Qty: 12 TABLET | Refills: 0 | Status: SHIPPED | OUTPATIENT
Start: 2023-02-24 | End: 2023-02-27

## 2023-02-24 NOTE — PROGRESS NOTES
John is a 67 year old who is being evaluated via a billable telephone visit.      What phone number would you like to be contacted at? [phone]  How would you like to obtain your AVS? Laura    Distant Location (provider location):  On-site    Assessment & Plan     Herpes zoster without complication    - predniSONE (DELTASONE) 20 MG tablet; Take 2 tablets (40 mg) by mouth daily for 5 days  - oxyCODONE (ROXICODONE) 5 MG tablet; Take 1 tablet (5 mg) by mouth every 6 hours as needed for pain           MED REC REQUIRED  Post Medication Reconciliation Status: patient was not discharged from an inpatient facility or TCU     FUTURE APPOINTMENTS:       - Follow up in 3 days for symptoms that are not improving, sooner for new or worsening sx.       No follow-ups on file.    DYANA Valente CNP  [facility]   John is a 67 year old, presenting for the following health issues:  ER F/U      HPI     ED/UC Followup:    Facility:  [facility]   Date of visit: 2/20/23  Reason for visit: shingles   Current Status: Patient has intense constant burning pain in his neck . Was offered pain medication in ED but declined. Thought it would get better but pain is affecting his sleep. Tried taking ibuprofen and has little to no relief. Would like something for the pain. Does not have rash on the face. Pain is the swelling in the lymph nodes of neck and under jaw. Having a hard time sleeping due to pain.      Review of Systems   Constitutional, HEENT, cardiovascular, pulmonary, gi and gu systems are negative, except as otherwise noted.      Objective           Vitals:  No vitals were obtained today due to virtual visit.    Physical Exam   healthy, alert and no distress  PSYCH: Alert and oriented times 3; coherent speech, normal   rate and volume, able to articulate logical thoughts, able   to abstract reason, no tangential thoughts, no hallucinations   or delusions  His affect is normal  and pleasant  RESP: No cough, no audible wheezing, able to talk in full sentences  Remainder of exam unable to be completed due to telephone visits                Phone call duration: 12 minutes

## 2023-02-24 NOTE — TELEPHONE ENCOUNTER
Pt was called, he is asking for something for his shingles pain. He was diagnosed with shingles in  2/20/2023. Virtual visit made for today.    Whitney Beltran RN

## 2023-02-24 NOTE — TELEPHONE ENCOUNTER
General Call    Contacts       Type Contact Phone/Fax    02/24/2023 08:36 AM CST Phone (Incoming) John Gregg (Self) [phone] (M)        Reason for Call:  Requesting something for Pain for Shingles    What are your questions or concerns:  Pt said that he was in [facility] 2/20/23 for Shingles.  Pt said he is having a lot of pain in the Neck due to swollen glands. Pt has shingles Rt side of head, face and Neck.  Pt is wondering what he can take for this or can he get something for Pain.   Pt has tried Advil & Tylenol at max dosage. The Pain doesn't go completely away. No pt is having sleeping issues due to the pain.    Date of last appointment with provider: 2/20/23    Could we send this information to you in [facility] or would you prefer to receive a phone call?:   Patient would prefer a phone call   Okay to leave a detailed message?: Yes at Home number on file There is no home phone number on file.

## 2023-03-13 DIAGNOSIS — I25.10 CORONARY ARTERY DISEASE INVOLVING NATIVE CORONARY ARTERY OF NATIVE HEART WITHOUT ANGINA PECTORIS: ICD-10-CM

## 2023-03-14 RX ORDER — ROSUVASTATIN CALCIUM 20 MG/1
20 TABLET, COATED ORAL DAILY
Qty: 90 TABLET | Refills: 3 | Status: SHIPPED | OUTPATIENT
Start: 2023-03-14

## 2023-05-15 ENCOUNTER — OFFICE VISIT (OUTPATIENT)
Dept: CARDIOLOGY | Facility: CLINIC | Age: 68
End: 2023-05-15
Payer: COMMERCIAL

## 2023-05-15 VITALS
HEART RATE: 60 BPM | BODY MASS INDEX: 31.97 KG/M2 | SYSTOLIC BLOOD PRESSURE: 125 MMHG | DIASTOLIC BLOOD PRESSURE: 77 MMHG | OXYGEN SATURATION: 96 % | WEIGHT: 222.8 LBS

## 2023-05-15 DIAGNOSIS — R07.9 CHEST PAIN, UNSPECIFIED TYPE: Primary | ICD-10-CM

## 2023-05-15 DIAGNOSIS — I25.10 CORONARY ARTERY DISEASE INVOLVING NATIVE CORONARY ARTERY OF NATIVE HEART WITHOUT ANGINA PECTORIS: ICD-10-CM

## 2023-05-15 PROCEDURE — 99215 OFFICE O/P EST HI 40 MIN: CPT | Performed by: INTERNAL MEDICINE

## 2023-05-15 NOTE — LETTER
5/15/2023    Brennan Pineda MD  No address on file    RE: Evan SIEGEL [place]       Dear Colleague,     I had the pleasure of seeing Evan Albertoaidan in the [facility].      HPI:     This is a 68 yr old male with PMH CAD with abnormal CTA in 2020, LAD 60-69% stenosis and 25-49% of the RCA. FFR mid LAD 0.89, distal LAD FFR was 0.58 indicating hemodynamic significance. He did not have coronary angiogram at the time due to lack of symptoms. Today he is transferring care up to [place] as job at [employer] is ending. He has occasional chest pressure at rest at work or driving in the car, occasional neck pressure no arm radiation. Is very active - ran marathon, strength training, biking now every day - does not bring on pressure.     Family history with congestive heart failure in mother and father with CAD.     Nuclear stress and echocardiogram in 2022.     ASSESSMENT/PLAN:     1. Coronary artery disease:   -recommended coronary angiogram given symptoms (although some atypical) and already known diagnosis of obstructive CAD on his coronary CT angiogram but after in depth conversation patient would reasonably prefer to undergo stress testing first. We are monitoring the RCA, mid LAD lesions.   -we discussed nature of plaque (soft versus calcified) and it is not always easy to predict the stability of plaque formation  -continue aggressive risk factor modification   -active lifestyle  -check lipid panel  -continue aspirin 81 mg daily and statin  -follow up with Mayra after stress test to discuss results     Shawanda Merino MD [facility]      PAST MEDICAL HISTORY  Past Medical History:   Diagnosis Date    Anxiety 7/23/2019    Arthritis 3/25/2014    Depressive disorder 7/23/2019    Hearing problem 7/23/2019    Reduced vision 7/23/2019       CURRENT MEDICATIONS  Current Outpatient Medications   Medication Sig Dispense Refill    aspirin (ASA) 81 MG EC tablet Take 1 tablet (81 mg) by mouth daily 90  tablet 3    metroNIDAZOLE (METROGEL) 0.75 % external gel Apply topically every morning 45 g 3    rosuvastatin (CRESTOR) 20 MG tablet Take 1 tablet (20 mg) by mouth daily 90 tablet 3    triamcinolone (KENALOG) 0.025 % cream Apply topically 2 times daily 454 g 1    triamcinolone (KENALOG) 0.1 % external cream Apply topically 2 times daily For flares 454 g 1    doxycycline hyclate (VIBRAMYCIN) 100 MG capsule Take 1 capsule (100 mg) by mouth 2 times daily (Patient not taking: Reported on 11/3/2022) 84 capsule 0    ketoconazole (NIZORAL) 2 % external shampoo Lather in shower and apply to trunk, leave on for a couple of minutes, then rinse, do every other day alternating with a benzoyl peroxide wash. Do not use if products cause irritation. 120 mL 5    metroNIDAZOLE (METROGEL) 0.75 % gel Apply pea-sized amount or less to cheeks and nose as needed. 45 g 1    pimecrolimus (ELIDEL) 1 % external cream Apply topically every evening Elidel would be preferrable to Protopic, because it is a cream and not ointment and this is easier to use around the eyes. Use around the eyes in the evening and on eczematous lesions 2xdaily 60 g 3    triamcinolone (KENALOG) 0.025 % cream mix 50/50 with Eucerin for eczema. (Patient will mix himself) 240 g 5    valACYclovir (VALTREX) 1000 mg tablet Take 1 tablet (1,000 mg) by mouth 3 times daily for 7 days 21 tablet 0       PAST SURGICAL HISTORY:  Past Surgical History:   Procedure Laterality Date    COLONOSCOPY  11/11/2013    Procedure: COLONOSCOPY;  Colonoscopy;  Surgeon: Montana Corona MD;  Location: [facility]    HIP SURGERY  June, 2016    Resurfacing of right hip.     ORTHOPEDIC SURGERY  Summer 2016    Bilateral hip resurfacing       ALLERGIES     Allergies   Allergen Reactions    No Known Drug Allergy        FAMILY HISTORY  Family History   Problem Relation Age of Onset    Thyroid Disease Mother     Skin Cancer Father     Heart Disease Father 56        heart attack    Cancer Father          skin cancer    Lipids Father     Hypertension Father     Cancer - colorectal Maternal Grandfather     Skin Cancer Brother     Thyroid Disease Brother     Cancer Brother         skin ca    Cancer Maternal Uncle 60        Lung    Glaucoma No family hx of     Macular Degeneration No family hx of     Melanoma No family hx of      SOCIAL HISTORY  Social History     Socioeconomic History    Marital status:      Spouse name: Not on file    Number of children: Not on file    Years of education: Not on file    Highest education level: Not on file   Occupational History    Not on file   Tobacco Use    Smoking status: Never    Smokeless tobacco: Never   Vaping Use    Vaping status: Never Used   Substance and Sexual Activity    Alcohol use: Yes     Comment: ocass    Drug use: No    Sexual activity: Yes     Partners: Female     Birth control/protection: Male Surgical   Other Topics Concern    Parent/sibling w/ CABG, MI or angioplasty before 65F 55M? Yes   Social History Narrative    Not on file     Social Determinants of Health     Financial Resource Strain: Not on file   Food Insecurity: Not on file   Transportation Needs: Not on file   Physical Activity: Not on file   Stress: Not on file   Social Connections: Not on file   Intimate Partner Violence: Not on file   Housing Stability: Not on file       ROS:   Constitutional: No fever, chills, or sweats. No weight gain/loss   ENT: No visual disturbance, ear ache, epistaxis, sore throat  Allergies/Immunologic: Negative  Respiratory: No cough, hemoptysia  Cardiovascular: As per HPI  GI: No nausea, vomiting, hematemesis, melena, or hematochezia  : No urinary frequency, dysuria, or hematuria  Integument: Negative  Psychiatric: Negative  Neuro: Negative  Endocrinology: Negative   Musculoskeletal: Negative  Vascular: No walking impairment, claudication, ischemic rest pain or nonhealing wounds    EXAM:  /77 (BP Location: Right arm, Patient Position: Sitting, Cuff Size:  Adult Large)   Pulse 60   Wt 101.1 kg (222 lb 12.8 oz)   SpO2 96%   BMI 31.97 kg/m    In general, the patient is a pleasant male in no apparent distress.    HEENT: NC/AT.  PERRLA.  EOMI.  Sclerae white, not injected.   Neck: No adenopathy.  No thyromegaly. Carotids +2/2 bilaterally without bruits.  No jugular venous distension.   Heart: RRR. Normal S1, S2 splits physiologically. No murmur, rub, click, or gallop.   Lungs: CTA.  No ronchi, wheezes, rales.  No dullness to percussion.   Abdomen: Soft, nontender, nondistended.   Extremities: No clubbing, cyanosis, or edema.   Vascular: No bruits are noted.    Labs:  LIPID RESULTS:  Lab Results   Component Value Date    CHOL 125 03/26/2022    CHOL 118 03/08/2021    HDL 45 03/26/2022    HDL 44 03/08/2021    LDL 65 03/26/2022    LDL 53 03/08/2021    TRIG 76 03/26/2022    TRIG 105 03/08/2021    CHOLHDLRATIO 5.0 12/10/2009    NHDL 80 03/26/2022    NHDL 74 03/08/2021       LIVER ENZYME RESULTS:  Lab Results   Component Value Date    AST 38 12/04/2003    ALT 36 12/04/2003       CBC RESULTS:  Lab Results   Component Value Date    WBC 4.7 03/26/2022    WBC 4.2 03/08/2021    RBC 5.05 03/26/2022    RBC 4.82 03/08/2021    HGB 16.0 03/26/2022    HGB 15.1 03/08/2021    HCT 47.6 03/26/2022    HCT 44.8 03/08/2021    MCV 94 03/26/2022    MCV 93 03/08/2021    MCH 31.7 03/26/2022    MCH 31.3 03/08/2021    MCHC 33.6 03/26/2022    MCHC 33.7 03/08/2021    RDW 11.8 03/26/2022    RDW 11.8 03/08/2021     03/26/2022     03/08/2021       BMP RESULTS:  Lab Results   Component Value Date     03/26/2022     03/08/2021    POTASSIUM 3.9 03/26/2022    POTASSIUM 4.0 03/08/2021    CHLORIDE 103 03/26/2022    CHLORIDE 104 03/08/2021    CO2 26 03/26/2022    CO2 28 03/08/2021    ANIONGAP 5 03/26/2022    ANIONGAP 6 03/08/2021     (H) 03/26/2022     (H) 03/08/2021    BUN 10 03/26/2022    BUN 15 03/08/2021    CR 0.71 03/26/2022    CR 0.77 03/08/2021    GFRESTIMATED >90  03/26/2022    GFRESTIMATED >90 03/08/2021    GFRESTBLACK >90 03/08/2021    COSTA 8.5 03/26/2022    COSTA 8.9 03/08/2021        A1C RESULTS:  No results found for: A1C      Thank you for allowing me to participate in the care of your patient.      Sincerely,   Shawanda Merino MD   [facility]  cc: No referring provider defined for this encounter.

## 2023-05-15 NOTE — PROGRESS NOTES
HPI:     This is a 68 yr old male with PMH CAD with abnormal CTA in 2020, LAD 60-69% stenosis and 25-49% of the RCA. FFR mid LAD 0.89, distal LAD FFR was 0.58 indicating hemodynamic significance. He did not have coronary angiogram at the time due to lack of symptoms. Today he is transferring care up to [place] as job at  is ending. He has occasional chest pressure at rest at work or driving in the car, occasional neck pressure no arm radiation. Is very active - ran marathon, strength training, biking now every day - does not bring on pressure.     Family history with congestive heart failure in mother and father with CAD.     Nuclear stress and echocardiogram in 2022.     ASSESSMENT/PLAN:     1. Coronary artery disease:   -recommended coronary angiogram given symptoms (although some atypical) and already known diagnosis of obstructive CAD on his coronary CT angiogram but after in depth conversation patient would reasonably prefer to undergo stress testing first. We are monitoring the RCA, mid LAD lesions.   -we discussed nature of plaque (soft versus calcified) and it is not always easy to predict the stability of plaque formation  -continue aggressive risk factor modification   -active lifestyle  -check lipid panel  -continue aspirin 81 mg daily and statin  -follow up with Mayra after stress test to discuss results     Shawanda Merino MD [facility]      PAST MEDICAL HISTORY  Past Medical History:   Diagnosis Date     Anxiety 7/23/2019     Arthritis 3/25/2014     Depressive disorder 7/23/2019     Hearing problem 7/23/2019     Reduced vision 7/23/2019       CURRENT MEDICATIONS  Current Outpatient Medications   Medication Sig Dispense Refill     aspirin (ASA) 81 MG EC tablet Take 1 tablet (81 mg) by mouth daily 90 tablet 3     metroNIDAZOLE (METROGEL) 0.75 % external gel Apply topically every morning 45 g 3     rosuvastatin (CRESTOR) 20 MG tablet Take 1 tablet (20 mg) by mouth daily 90 tablet 3      triamcinolone (KENALOG) 0.025 % cream Apply topically 2 times daily 454 g 1     triamcinolone (KENALOG) 0.1 % external cream Apply topically 2 times daily For flares 454 g 1     doxycycline hyclate (VIBRAMYCIN) 100 MG capsule Take 1 capsule (100 mg) by mouth 2 times daily (Patient not taking: Reported on 11/3/2022) 84 capsule 0     ketoconazole (NIZORAL) 2 % external shampoo Lather in shower and apply to trunk, leave on for a couple of minutes, then rinse, do every other day alternating with a benzoyl peroxide wash. Do not use if products cause irritation. 120 mL 5     metroNIDAZOLE (METROGEL) 0.75 % gel Apply pea-sized amount or less to cheeks and nose as needed. 45 g 1     pimecrolimus (ELIDEL) 1 % external cream Apply topically every evening Elidel would be preferrable to Protopic, because it is a cream and not ointment and this is easier to use around the eyes. Use around the eyes in the evening and on eczematous lesions 2xdaily 60 g 3     triamcinolone (KENALOG) 0.025 % cream mix 50/50 with Eucerin for eczema. (Patient will mix himself) 240 g 5     valACYclovir (VALTREX) 1000 mg tablet Take 1 tablet (1,000 mg) by mouth 3 times daily for 7 days 21 tablet 0       PAST SURGICAL HISTORY:  Past Surgical History:   Procedure Laterality Date     COLONOSCOPY  11/11/2013    Procedure: COLONOSCOPY;  Colonoscopy;  Surgeon: Montana Corona MD;  Location: [facility]     HIP SURGERY  June, 2016    Resurfacing of right hip.      ORTHOPEDIC SURGERY  Summer 2016    Bilateral hip resurfacing       ALLERGIES     Allergies   Allergen Reactions     No Known Drug Allergy        FAMILY HISTORY  Family History   Problem Relation Age of Onset     Thyroid Disease Mother      Skin Cancer Father      Heart Disease Father 56        heart attack     Cancer Father         skin cancer     Lipids Father      Hypertension Father      Cancer - colorectal Maternal Grandfather      Skin Cancer Brother      Thyroid Disease Brother      Cancer  Brother         skin ca     Cancer Maternal Uncle 60        Lung     Glaucoma No family hx of      Macular Degeneration No family hx of      Melanoma No family hx of      SOCIAL HISTORY  Social History     Socioeconomic History     Marital status:      Spouse name: Not on file     Number of children: Not on file     Years of education: Not on file     Highest education level: Not on file   Occupational History     Not on file   Tobacco Use     Smoking status: Never     Smokeless tobacco: Never   Vaping Use     Vaping status: Never Used   Substance and Sexual Activity     Alcohol use: Yes     Comment: ocass     Drug use: No     Sexual activity: Yes     Partners: Female     Birth control/protection: Male Surgical   Other Topics Concern     Parent/sibling w/ CABG, MI or angioplasty before 65F 55M? Yes   Social History Narrative     Not on file     Social Determinants of Health     Financial Resource Strain: Not on file   Food Insecurity: Not on file   Transportation Needs: Not on file   Physical Activity: Not on file   Stress: Not on file   Social Connections: Not on file   Intimate Partner Violence: Not on file   Housing Stability: Not on file       ROS:   Constitutional: No fever, chills, or sweats. No weight gain/loss   ENT: No visual disturbance, ear ache, epistaxis, sore throat  Allergies/Immunologic: Negative  Respiratory: No cough, hemoptysia  Cardiovascular: As per HPI  GI: No nausea, vomiting, hematemesis, melena, or hematochezia  : No urinary frequency, dysuria, or hematuria  Integument: Negative  Psychiatric: Negative  Neuro: Negative  Endocrinology: Negative   Musculoskeletal: Negative  Vascular: No walking impairment, claudication, ischemic rest pain or nonhealing wounds    EXAM:  /77 (BP Location: Right arm, Patient Position: Sitting, Cuff Size: Adult Large)   Pulse 60   Wt 101.1 kg (222 lb 12.8 oz)   SpO2 96%   BMI 31.97 kg/m    In general, the patient is a pleasant male in no apparent  distress.    HEENT: NC/AT.  PERRLA.  EOMI.  Sclerae white, not injected.   Neck: No adenopathy.  No thyromegaly. Carotids +2/2 bilaterally without bruits.  No jugular venous distension.   Heart: RRR. Normal S1, S2 splits physiologically. No murmur, rub, click, or gallop.   Lungs: CTA.  No ronchi, wheezes, rales.  No dullness to percussion.   Abdomen: Soft, nontender, nondistended.   Extremities: No clubbing, cyanosis, or edema.   Vascular: No bruits are noted.    Labs:  LIPID RESULTS:  Lab Results   Component Value Date    CHOL 125 03/26/2022    CHOL 118 03/08/2021    HDL 45 03/26/2022    HDL 44 03/08/2021    LDL 65 03/26/2022    LDL 53 03/08/2021    TRIG 76 03/26/2022    TRIG 105 03/08/2021    CHOLHDLRATIO 5.0 12/10/2009    NHDL 80 03/26/2022    NHDL 74 03/08/2021       LIVER ENZYME RESULTS:  Lab Results   Component Value Date    AST 38 12/04/2003    ALT 36 12/04/2003       CBC RESULTS:  Lab Results   Component Value Date    WBC 4.7 03/26/2022    WBC 4.2 03/08/2021    RBC 5.05 03/26/2022    RBC 4.82 03/08/2021    HGB 16.0 03/26/2022    HGB 15.1 03/08/2021    HCT 47.6 03/26/2022    HCT 44.8 03/08/2021    MCV 94 03/26/2022    MCV 93 03/08/2021    MCH 31.7 03/26/2022    MCH 31.3 03/08/2021    MCHC 33.6 03/26/2022    MCHC 33.7 03/08/2021    RDW 11.8 03/26/2022    RDW 11.8 03/08/2021     03/26/2022     03/08/2021       BMP RESULTS:  Lab Results   Component Value Date     03/26/2022     03/08/2021    POTASSIUM 3.9 03/26/2022    POTASSIUM 4.0 03/08/2021    CHLORIDE 103 03/26/2022    CHLORIDE 104 03/08/2021    CO2 26 03/26/2022    CO2 28 03/08/2021    ANIONGAP 5 03/26/2022    ANIONGAP 6 03/08/2021     (H) 03/26/2022     (H) 03/08/2021    BUN 10 03/26/2022    BUN 15 03/08/2021    CR 0.71 03/26/2022    CR 0.77 03/08/2021    GFRESTIMATED >90 03/26/2022    GFRESTIMATED >90 03/08/2021    GFRESTBLACK >90 03/08/2021    COSTA 8.5 03/26/2022    COSTA 8.9 03/08/2021        A1C RESULTS:  No results  found for: A1C

## 2023-07-02 ENCOUNTER — HEALTH MAINTENANCE LETTER (OUTPATIENT)
Age: 68
End: 2023-07-02

## 2023-11-14 ENCOUNTER — TELEPHONE (OUTPATIENT)
Dept: FAMILY MEDICINE | Facility: CLINIC | Age: 68
End: 2023-11-14
Payer: COMMERCIAL

## 2023-11-14 NOTE — TELEPHONE ENCOUNTER
Patient Quality Outreach    Patient is due for the following:   Colon Cancer Screening  Physical Annual Wellness Visit    Next Steps:   Schedule a Annual Wellness Visit    Type of outreach:    Sent letter.    Next Steps:  Reach out within 90 days via Letter.    Max number of attempts reached: No. Will try again in 90 days if patient still on fail list.    Questions for provider review:    None           Francine Mohamud, [facility]

## 2023-11-14 NOTE — LETTER
November 14, 2023    To  Evan Gregg  [id number] [facility] [id number]    Your team at [facility] cares about your health. We have reviewed your chart and based on our findings; we are making the following recommendations to better manage your health.     You are in particular need of attention regarding the following:     Call or MyChart message your clinic to schedule a colonoscopy, schedule/ a FIT Test, or order a Cologuard test. If you are unsure what type of test you need, please call your clinic and speak to clinic staff.   Colon cancer is now the second leading cause of cancer-related deaths in the [place] for both men and women and there are over 130,000 new cases and 50,000 deaths per year from colon cancer. Colonoscopies can prevent 90-95% of these deaths. Problem lesions can be removed before they ever become cancer. This test is not only looking for cancer, but also getting rid of precancerous lesions.   PREVENTATIVE VISIT: Annual Medicare Wellness:Schedule an Annual Medicare Wellness Exam. Please call your [facility] clinic to set up your appointment.    If you have already completed these items, please contact the clinic via phone or   [employer]hart so your care team can review and update your records. Thank you for   choosing [facility] for your healthcare needs. For any questions,   concerns, or to schedule an appointment please contact our clinic.    Healthy Regards,      Your [facility] Care Team

## 2025-05-31 ENCOUNTER — HEALTH MAINTENANCE LETTER (OUTPATIENT)
Age: 70
End: 2025-05-31

## (undated) RX ORDER — REGADENOSON 0.08 MG/ML
INJECTION, SOLUTION INTRAVENOUS
Status: DISPENSED
Start: 2022-04-26